# Patient Record
Sex: MALE | Race: WHITE
[De-identification: names, ages, dates, MRNs, and addresses within clinical notes are randomized per-mention and may not be internally consistent; named-entity substitution may affect disease eponyms.]

---

## 2019-12-07 ENCOUNTER — HOSPITAL ENCOUNTER (INPATIENT)
Dept: HOSPITAL 97 - ER | Age: 69
LOS: 2 days | Discharge: HOME | DRG: 287 | End: 2019-12-09
Attending: FAMILY MEDICINE | Admitting: HOSPITALIST
Payer: COMMERCIAL

## 2019-12-07 VITALS — BODY MASS INDEX: 46.3 KG/M2

## 2019-12-07 DIAGNOSIS — E78.5: ICD-10-CM

## 2019-12-07 DIAGNOSIS — Z99.81: ICD-10-CM

## 2019-12-07 DIAGNOSIS — I24.9: Primary | ICD-10-CM

## 2019-12-07 DIAGNOSIS — I11.0: ICD-10-CM

## 2019-12-07 DIAGNOSIS — I25.2: ICD-10-CM

## 2019-12-07 DIAGNOSIS — I25.10: ICD-10-CM

## 2019-12-07 DIAGNOSIS — I50.32: ICD-10-CM

## 2019-12-07 DIAGNOSIS — I48.0: ICD-10-CM

## 2019-12-07 DIAGNOSIS — K21.9: ICD-10-CM

## 2019-12-07 DIAGNOSIS — E66.01: ICD-10-CM

## 2019-12-07 DIAGNOSIS — G47.33: ICD-10-CM

## 2019-12-07 LAB
ALBUMIN SERPL BCP-MCNC: 3.9 G/DL (ref 3.4–5)
ALP SERPL-CCNC: 47 U/L (ref 45–117)
ALT SERPL W P-5'-P-CCNC: 30 U/L (ref 12–78)
AST SERPL W P-5'-P-CCNC: 16 U/L (ref 15–37)
BUN BLD-MCNC: 15 MG/DL (ref 7–18)
GLUCOSE SERPLBLD-MCNC: 121 MG/DL (ref 74–106)
HCT VFR BLD CALC: 36.8 % (ref 39.6–49)
HDLC SERPL-MCNC: 56 MG/DL (ref 40–60)
INR BLD: 1.04
LDLC SERPL CALC-MCNC: 58 MG/DL (ref ?–130)
LYMPHOCYTES # SPEC AUTO: 1.4 K/UL (ref 0.7–4.9)
MAGNESIUM SERPL-MCNC: 2.2 MG/DL (ref 1.8–2.4)
NT-PROBNP SERPL-MCNC: 979 PG/ML (ref ?–125)
PMV BLD: 10.1 FL (ref 7.6–11.3)
POTASSIUM SERPL-SCNC: 4.2 MMOL/L (ref 3.5–5.1)
RBC # BLD: 4.23 M/UL (ref 4.33–5.43)
TROPONIN (EMERG DEPT USE ONLY): 0.05 NG/ML (ref 0–0.04)
TROPONIN I: 0.68 NG/ML (ref 0–0.04)

## 2019-12-07 PROCEDURE — 80076 HEPATIC FUNCTION PANEL: CPT

## 2019-12-07 PROCEDURE — 80048 BASIC METABOLIC PNL TOTAL CA: CPT

## 2019-12-07 PROCEDURE — 80061 LIPID PANEL: CPT

## 2019-12-07 PROCEDURE — 71045 X-RAY EXAM CHEST 1 VIEW: CPT

## 2019-12-07 PROCEDURE — 84484 ASSAY OF TROPONIN QUANT: CPT

## 2019-12-07 PROCEDURE — 93454 CORONARY ARTERY ANGIO S&I: CPT

## 2019-12-07 PROCEDURE — 85610 PROTHROMBIN TIME: CPT

## 2019-12-07 PROCEDURE — 96374 THER/PROPH/DIAG INJ IV PUSH: CPT

## 2019-12-07 PROCEDURE — 83880 ASSAY OF NATRIURETIC PEPTIDE: CPT

## 2019-12-07 PROCEDURE — 93005 ELECTROCARDIOGRAM TRACING: CPT

## 2019-12-07 PROCEDURE — 94760 N-INVAS EAR/PLS OXIMETRY 1: CPT

## 2019-12-07 PROCEDURE — 96372 THER/PROPH/DIAG INJ SC/IM: CPT

## 2019-12-07 PROCEDURE — 36415 COLL VENOUS BLD VENIPUNCTURE: CPT

## 2019-12-07 PROCEDURE — 85025 COMPLETE CBC W/AUTO DIFF WBC: CPT

## 2019-12-07 PROCEDURE — 99285 EMERGENCY DEPT VISIT HI MDM: CPT

## 2019-12-07 PROCEDURE — 96375 TX/PRO/DX INJ NEW DRUG ADDON: CPT

## 2019-12-07 PROCEDURE — 83735 ASSAY OF MAGNESIUM: CPT

## 2019-12-07 PROCEDURE — 81003 URINALYSIS AUTO W/O SCOPE: CPT

## 2019-12-07 RX ADMIN — Medication SCH ML: at 21:14

## 2019-12-07 RX ADMIN — ROSUVASTATIN CALCIUM SCH MG: 10 TABLET, COATED ORAL at 21:13

## 2019-12-07 NOTE — ER
Nurse's Notes                                                                                     

 [facility]                                                                 

Name: Santino Ma                                                                               

Age: 69 yrs                                                                                       

Sex: Male                                                                                         

: 1950                                                                                   

MRN: [id number]                                                                                   

Arrival Date: 2019                                                                          

Time: 12:51                                                                                       

Account#: [id number]                                                                            

Bed 26                                                                                            

Private MD: Gaston Ruiz B                                                                     

Diagnosis: Non-ST elevation (NSTEMI) myocardial infarction                                        

                                                                                                  

Presentation:                                                                                     

                                                                                             

13:11 Presenting complaint: Patient states: Aching chest pain that began approx 1 hour ago    ph  

      while watching TV, also reports SOB x approx 1 month, denies N/V, hx of A-fib and MI.       

      Transition of care: patient was not received from another setting of care. Onset of         

      symptoms was 2019. Risk Assessment: Do you want to hurt yourself or            

      someone else? Patient reports no desire to harm self or others. Initial Sepsis Screen:      

      Does the patient meet any 2 criteria? No. Patient's initial sepsis screen is negative.      

      Does the patient have a suspected source of infection? No. Patient's initial sepsis         

      screen is negative. Care prior to arrival: None.                                            

13:11 Method Of Arrival: Ambulatory                                                           ph  

13:11 Acuity: YASHIRA 3                                                                           ph  

                                                                                                  

Historical:                                                                                       

- Allergies:                                                                                      

13:14 PENICILLINS;                                                                            ph  

- Home Meds:                                                                                      

13:14 Amiodarone Oral [Active]; aspirin 81 mg Oral chew [Active]; Albuterol Inhl [Active];    ph  

      Protonix Oral [Active]; Crestor oral oral [Active];                                         

- PMHx:                                                                                           

13:14 Atrial Fib; Hyperlipidemia; Hypertension; Myocardial infarction;                        ph  

- PSHx:                                                                                           

13:14 cardiac stents; Bilateral knee replacement; Tonsillectomy;                              ph  

                                                                                                  

- Immunization history:: Adult Immunizations up to date, Pneumococcal vaccine is up to            

  date, Flu vaccine is up to date.                                                                

- Social history:: Smoking status: Patient uses tobacco products, reports smoking                 

  history of 30 years, but quit years ago >10 years ago.                                          

                                                                                                  

                                                                                                  

Screening:                                                                                        

15:19 Abuse screen: Denies threats or abuse. Nutritional screening:. Tuberculosis screening:  sr5 

      No symptoms or risk factors identified. Fall Risk No fall in past 12 months (0 pts).        

      Secondary diagnosis (15 points) IV access (20 points). Ambulatory Aid- None/Bed             

      Rest/Nurse Assist (0 pts). Gait- Impaired (20 pts.). Mental Status- Oriented to own         

      ability (0 pts). Total Kerns Fall Scale indicates Low Risk Score (25-44 pts). Fall          

      prevention measures have been instituted. Frequent Obs/Assesments occuring Family           

      Present and informed to notify staff if they need to leave bedside As available Patient     

      and Family Educated on Fall Prevention Program and strategies.                              

                                                                                                  

Assessment:                                                                                       

13:41 General: Appears in no apparent distress. comfortable, Behavior is calm, cooperative.   sr5 

      Pain: Complains of pain in chest. Neuro: Level of Consciousness is awake, alert, obeys      

      commands, Oriented to person, place, time, situation, Speech is normal. Cardiovascular:     

      Capillary refill < 3 seconds in bilateral fingers toes Patient's skin is warm and dry.      

      +2 BLE edema. Rhythm is sinus rhythm. Respiratory: Airway is patent Respiratory effort      

      is even, unlabored, Respiratory pattern is regular, symmetrical, Breath sounds are          

      clear bilaterally. GI: No signs and/or symptoms were reported involving the                 

      gastrointestinal system. : No signs and/or symptoms were reported regarding the           

      genitourinary system. EENT: No signs and/or symptoms were reported regarding the EENT       

      system. Derm: No signs and/or symptoms reported regarding the dermatologic system.          

13:43 Cardiovascular: Reports chest pain, shortness of breath, aching, nonradiating started 1 sr5 

      hr PTA                                                                                      

16:30 Reassessment: Patient and/or family updated on plan of care and expected duration. Pain sr5 

      level reassessed. Patient is alert, oriented x 3, equal unlabored respirations, skin        

      warm/dry/pink. Awaiting inpatient room assignment.                                          

16:47 Reassessment: Patient and/or family updated on plan of care and expected duration. Pain sr5 

      level reassessed. Patient is alert, oriented x 3, equal unlabored respirations, skin        

      warm/dry/pink. Awaiting room assignment. Pain: Pain does not radiate. Pain began 1 hr       

      PTA today.                                                                                  

17:22 Reassessment: Report called to Marnie fernandez RN on 4th floor. Reported repeat        sr5 

      troponin due at 1810. Also spoke with ER provider regarding rising BP, no new orders        

      received at this time. Updated family on POC.                                               

                                                                                                  

Vital Signs:                                                                                      

13:15  / 77; Pulse 68; Resp 18; Pulse Ox 97% on R/A; Weight 136.08 kg; Height 5 ft. 9   ph  

      in. (175.26 cm); Pain 3/10;                                                                 

13:43  / 81; Pulse 58; Resp 18; Temp 98.6(O); Pulse Ox 95% on R/A; Pain 5/10;           sr5 

15:19  / 63; Pulse 62; Resp 16; Pulse Ox 99% on R/A; Pain 1/10;                         sr5 

15:38 Weight 142.2 kg (M);                                                                    sr5 

16:29  / 55; Pulse 56; Resp 18; Pulse Ox 99% on R/A; Pain 1/10;                         sr5 

17:15  / 76; Pulse 56; Resp 18; Pulse Ox 95% on R/A;                                    sr5 

15:38 Body Mass Index 46.30 (142.20 kg, 175.26 cm)                                            sr5 

15:19 Dr. Lynn at bedside                                                                   sr5 

16:29 LEFT sided chest pain, dull, nonrad                                                     sr5 

                                                                                                  

Vitals:                                                                                           

13:43 Cardiac Rhythm Assessment Sinus yeni.                                                  sr5 

15:19 Cardiac Rhythm Assessment Sinus rhythm.                                                 sr5 

16:47 Cardiac Rhythm Assessment Sinus yeni.                                                  sr5 

                                                                                                  

ED Course:                                                                                        

12:51 Patient arrived in ED.                                                                  am2 

12:51 Gaston Ruiz MD is Private Physician.                                                am2 

13:12 Triage completed.                                                                       ph  

13:13 Robi Bagley NP is PHCP.                                                           pm1 

13:13 Filiberto Kevin MD is Attending Physician.                                              pm1 

13:15 Arm band placed on Patient placed in an exam room, on a stretcher, on cardiac monitor,  ph  

      on pulse oximetry.                                                                          

13:18 EKG done, by ED staff, reviewed by Robi Bagley NP.                                  dh3 

13:30 XRAY Chest (1 view) In Process Unspecified.                                             EDMS

13:40 Yoandy Guerra, RN is Primary Nurse.                                                     sr5 

13:40 Initial lab(s) drawn, by me, sent to lab. Inserted saline lock: 20 gauge in left        lt1 

      antecubital area, using aseptic technique.                                                  

14:32 Danyel Lynn is Hospitalizing Provider.                                               pm1 

15:19 Admitting physician to see patient.                                                     sr5 

15:19 Patient has correct armband on for positive identification. Allergy band placed. Fall   sr5 

      risk band placed. Placed in gown. Bed in low position. Call light in reach. Side rails      

      up X 1. Cardiac monitor on. Pulse ox on. NIBP on.                                           

15:19 No provider procedures requiring assistance completed. Patient maintains SpO2           sr5 

      saturation greater than 95% on room air. O2 via Reports using CPAP at night.                

17:43 Patient admitted, IV remains in place.                                                  sr5 

                                                                                                  

Administered Medications:                                                                         

13:50 Drug: fentaNYL (PF) 50 mcg Route: IVP; Site: left antecubital;                          sr5 

15:17 Follow up: Response: Pain is decreased                                                  sr5 

13:50 Drug: Zofran 4 mg Route: IVP; Site: left antecubital;                                   sr5 

15:17 Follow up: Response: Nausea is decreased                                                sr5 

14:55 Drug: Aspirin 325 mg Route: PO;                                                         sr5 

17:44 Follow up: Response: Pain is decreased                                                  sr5 

15:39 Drug: Lovenox 1 mg/kg Route: Sub-Q; Site: abdomen;                                      sr5 

17:44 Follow up: Response: No adverse reaction                                                sr5 

16:29 Drug: fentaNYL (PF) 25 mcg Route: IVP; Site: left antecubital;                          sr5 

17:43 Follow up: Response: Pain is decreased                                                  sr5 

                                                                                                  

                                                                                                  

Outcome:                                                                                          

14:34 Decision to Hospitalize by Provider.                                                    pm1 

17:41 Patient left the ED.                                                                    sr5 

17:42 Admitted to Tele                                                                        sr5 

17:42 Condition: improved                                                                         

17:42 Instructed on the need for admit.                                                           

                                                                                                  

Signatures:                                                                                       

Dispatcher [employer]                           EDMS                                                 

Ivis Li, RN                      RN   ph                                                   

Robi Bagley, WALLACE                    NP   pm1                                                  

Yoandy Guerra RN                       RN   sr5                                                  

Blanche Santana Deanna                              3                                                  

Erica Shaw                                   1                                                  

                                                                                                  

Corrections: (The following items were deleted from the chart)                                    

15:19 13:14 Allergies: Codeine; ph                                                            sr5 

                                                                                                  

**************************************************************************************************

## 2019-12-07 NOTE — EDPHYS
Physician Documentation                                                                           

 [facility]                                                                 

Name: Santino Ma                                                                               

Age: 69 yrs                                                                                       

Sex: Male                                                                                         

: 1950                                                                                   

MRN: [id number]                                                                                   

Arrival Date: 2019                                                                          

Time: 12:51                                                                                       

Account#: [id number]                                                                            

Bed 26                                                                                            

Private MD: Gaston Ruiz B                                                                     

ED Physician Filiberto Kevin                                                                       

HPI:                                                                                              

                                                                                             

13:25 This 69 yrs old  Male presents to ER via Ambulatory with complaints of Chest   pm1 

      Pain > 29 y/o.                                                                              

13:25 The patient or guardian reports chest pain that is located primarily in the lateral     pm1 

      left breast. Onset: 2 hour(s) ago. The pain does not radiate. Associated signs and          

      symptoms: Pertinent positives: Tingling to left arm, Pertinent negatives: abdominal         

      pain, dizziness, headache, nausea, shortness of breath, vomiting. The chest pain is         

      described as sharp. Duration: The patient or guardian reports a single episode, that is     

      still ongoing. Severity of pain: in the emergency department the pain is a 4 / 10. The      

      patient has experienced a previous episode, approximately 2 years ago, symptoms similar     

      to MI requiring 1 stent.                                                                    

                                                                                                  

Historical:                                                                                       

- Allergies:                                                                                      

13:14 PENICILLINS;                                                                            ph  

- Home Meds:                                                                                      

13:14 Amiodarone Oral [Active]; aspirin 81 mg Oral chew [Active]; Albuterol Inhl [Active];    ph  

      Protonix Oral [Active]; Crestor oral oral [Active];                                         

- PMHx:                                                                                           

13:14 Atrial Fib; Hyperlipidemia; Hypertension; Myocardial infarction;                        ph  

- PSHx:                                                                                           

13:14 cardiac stents; Bilateral knee replacement; Tonsillectomy;                              ph  

                                                                                                  

- Immunization history:: Adult Immunizations up to date, Pneumococcal vaccine is up to            

  date, Flu vaccine is up to date.                                                                

- Social history:: Smoking status: Patient uses tobacco products, reports smoking                 

  history of 30 years, but quit years ago >10 years ago.                                          

                                                                                                  

                                                                                                  

ROS:                                                                                              

13:25 Constitutional: Negative for fever, chills, and weight loss, Eyes: Negative for injury, pm1 

      pain, redness, and discharge, ENT: Negative for injury, pain, and discharge, Neck:          

      Negative for injury, pain, and swelling.                                                    

13:25 Respiratory: Negative for shortness of breath, cough, wheezing, and pleuritic chest         

      pain, Abdomen/GI: Negative for abdominal pain, nausea, vomiting, diarrhea, and              

      constipation, Back: Negative for injury and pain, : Negative for injury, bleeding,        

      discharge, and swelling, MS/Extremity: Negative for injury and deformity, Skin:             

      Negative for injury, rash, and discoloration, Neuro: Negative for headache, weakness,       

      numbness, tingling, and seizure.                                                            

13:25 Cardiovascular: Positive for chest pain, Negative for edema, palpitations.                  

                                                                                                  

Exam:                                                                                             

13:25 Constitutional:  This is a well developed, well nourished patient who is awake, alert,  pm1 

      and in no acute distress. Head/Face:  Normocephalic, atraumatic. Neck:  Trachea             

      midline, no thyromegaly or masses palpated, and no cervical lymphadenopathy.  Supple,       

      full range of motion without nuchal rigidity, or vertebral point tenderness.  No            

      Meningismus. Chest/axilla:  Normal chest wall appearance and motion.  Nontender with no     

      deformity.  No lesions are appreciated. Cardiovascular:  Regular rate and rhythm with a     

      normal S1 and S2.  No gallops, murmurs, or rubs.  Normal PMI, no JVD.  No pulse             

      deficits. Respiratory:  Lungs have equal breath sounds bilaterally, clear to                

      auscultation and percussion.  No rales, rhonchi or wheezes noted.  No increased work of     

      breathing, no retractions or nasal flaring. Abdomen/GI:  Soft, non-tender, with normal      

      bowel sounds.  No distension or tympany.  No guarding or rebound.  No evidence of           

      tenderness throughout. Back:  No spinal tenderness.  No costovertebral tenderness.          

      Full range of motion. Skin:  Warm, dry with normal turgor.  Normal color with no            

      rashes, no lesions, and no evidence of cellulitis. MS/ Extremity:  Pulses equal, no         

      cyanosis.  Neurovascular intact.  Full, normal range of motion.                             

13:25 Neuro: Orientation: is normal, Motor: is normal, moves all fours, Sensation: is normal,     

      no obvious gross deficits.                                                                  

                                                                                                  

Vital Signs:                                                                                      

13:15  / 77; Pulse 68; Resp 18; Pulse Ox 97% on R/A; Weight 136.08 kg; Height 5 ft. 9   ph  

      in. (175.26 cm); Pain 3/10;                                                                 

13:43  / 81; Pulse 58; Resp 18; Temp 98.6(O); Pulse Ox 95% on R/A; Pain 5/10;           sr5 

15:19  / 63; Pulse 62; Resp 16; Pulse Ox 99% on R/A; Pain 1/10;                         sr5 

15:38 Weight 142.2 kg (M);                                                                    sr5 

16:29  / 55; Pulse 56; Resp 18; Pulse Ox 99% on R/A; Pain 1/10;                         sr5 

17:15  / 76; Pulse 56; Resp 18; Pulse Ox 95% on R/A;                                    sr5 

15:38 Body Mass Index 46.30 (142.20 kg, 175.26 cm)                                            sr5 

15:19 Dr. Lynn at bedside                                                                   sr5 

16:29 LEFT sided chest pain, dull, nonrad                                                     sr5 

                                                                                                  

MDM:                                                                                              

13:13 Patient medically screened.                                                             pm1 

14:31 Data reviewed: vital signs. Data interpreted: Pulse oximetry: on room air is 95 %.      pm1 

      Interpretation: normal.                                                                     

14:32 Physician consultation: Danyel Lynn regarding admission, patient's condition, would   pm1 

      like consultation with Dr. Soto.                                                          

14:43 Physician consultation: Mao Soto MD was contacted at 14:43, regarding consult,      pm1 

      patient's condition.                                                                        

16:20 ED course: Patient pain 1/10.                                                           pm1 

                                                                                                  

                                                                                             

13:13 Order name: Basic Metabolic Panel; Complete Time: 14:12                                 pm1 

                                                                                             

13:13 Order name: CBC with Diff; Complete Time: 14:02                                         pm1 

                                                                                             

13:13 Order name: LFT's; Complete Time: 14:12                                                 pm1 

                                                                                             

13:13 Order name: Magnesium; Complete Time: 14:12                                             pm1 

                                                                                             

13:13 Order name: NT PRO-BNP; Complete Time: 14:12                                            pm1 

                                                                                             

13:13 Order name: PT-INR; Complete Time: 14:02                                                pm1 

                                                                                             

13:13 Order name: Troponin (emerg Dept Use Only); Complete Time: 14:12                        pm1 

                                                                                             

13:13 Order name: XRAY Chest (1 view); Complete Time: 14:02                                   pm1 

                                                                                             

15:10 Order name: Urine Dipstick--Ancillary (enter results)                                   eb  

                                                                                             

13:13 Order name: EKG; Complete Time: 13:14                                                   pm1 

                                                                                             

13:13 Order name: Cardiac monitoring; Complete Time: 13:20                                    pm1 

                                                                                             

13:13 Order name: EKG - Nurse/Tech; Complete Time: 13:20                                      pm1 

                                                                                             

13:13 Order name: IV Saline Lock; Complete Time: 13:32                                        pm1 

                                                                                             

13:13 Order name: Labs collected and sent; Complete Time: 13:32                               pm1 

                                                                                             

13:13 Order name: O2 Per Protocol; Complete Time: 13:32                                       pm1 

                                                                                             

13:13 Order name: O2 Sat Monitoring; Complete Time: 13:32                                     pm1 

                                                                                                  

Administered Medications:                                                                         

13:50 Drug: fentaNYL (PF) 50 mcg Route: IVP; Site: left antecubital;                          sr5 

15:17 Follow up: Response: Pain is decreased                                                  sr5 

13:50 Drug: Zofran 4 mg Route: IVP; Site: left antecubital;                                   sr5 

15:17 Follow up: Response: Nausea is decreased                                                sr5 

14:55 Drug: Aspirin 325 mg Route: PO;                                                         sr5 

17:44 Follow up: Response: Pain is decreased                                                  sr5 

15:39 Drug: Lovenox 1 mg/kg Route: Sub-Q; Site: abdomen;                                      sr5 

17:44 Follow up: Response: No adverse reaction                                                sr5 

16:29 Drug: fentaNYL (PF) 25 mcg Route: IVP; Site: left antecubital;                          sr5 

17:43 Follow up: Response: Pain is decreased                                                  sr5 

                                                                                                  

                                                                                                  

Disposition:                                                                                      

                                                                                             

07:22 Co-signature as Attending Physician, Filiberto Kevin MD I agree with the assessment and   kdr 

      plan of care.                                                                               

                                                                                                  

Disposition:                                                                                      

19 14:34 Hospitalization ordered by Danyel Lynn for Inpatient Admission. Preliminary     

  diagnosis is Non-ST elevation (NSTEMI) myocardial infarction.                                   

- Bed requested for Telemetry/MedSurg (Inpatient).                                                

- Status is Inpatient Admission.                                                              sr5 

- Condition is Stable.                                                                            

- Problem is new.                                                                                 

- Symptoms have improved.                                                                         

UTI on Admission? No                                                                              

                                                                                                  

                                                                                                  

                                                                                                  

Signatures:                                                                                       

Dispatcher [employer]                           Monika Dickey RN RN dw Rittger, Kevin, MD MD   [facility]                                                  

Ivis Li RN RN   ph                                                   

Robi Bagley, WALLACE                    NP   pm1                                                  

Yoandy Guerra RN                       RN   sr5                                                  

                                                                                                  

Corrections: (The following items were deleted from the chart)                                    

                                                                                             

15:19 13:14 Allergies: Codeine; ph                                                            sr5 

16:54 14:34 Hospitalization Ordered by Danyel Lynn for Inpatient Admission. Preliminary     dw  

      diagnosis is Non-ST elevation (NSTEMI) myocardial infarction. Bed requested for             

      Telemetry/MedSurg (Inpatient). Status is Inpatient Admission. Condition is Stable.          

      Problem is new. Symptoms have improved. UTI on Admission? No. pm1                           

17:41 16:54 2019 14:34 Hospitalization Ordered by Danyel Lynn for Inpatient           sr5 

      Admission. Preliminary diagnosis is Non-ST elevation (NSTEMI) myocardial infarction.        

      Bed requested for Telemetry/MedSurg (Inpatient). Status is Inpatient Admission.             

      Condition is Stable. Problem is new. Symptoms have improved. UTI on Admission? No. dw       

                                                                                                  

**************************************************************************************************

## 2019-12-07 NOTE — P.HP
Certification for Inpatient


Patient admitted to: Observation


With expected LOS: <2 Midnights


Practitioner: I am a practitioner with admitting privileges, knowledge of 

patient current condition, hospital course, and medical plan of care.


Services: Services provided to patient in accordance with Admission 

requirements found in Title 42 Section 412.3 of the Code of Federal Regulations





Patient History


Date of Service: 12/07/19


Reason for admission: Chest pain


History of Present Illness: 


6-year-old morbidly obese gentleman with a history of obstructive sleep apnea, 

history of MI, coronary artery disease status post stent, history of paroxysmal 

atrial fibrillation on amiodarone presented to the ED due to sudden onset of 

chest pain which occurred at rest, located on the left anterior chest, moved to 

the left flank, constant, persisted for more than 1 hour and associated with 

tingling sensation in the left.  Patient stated he had tingling sensation in 

the left arm when he was diagnosed with MI 2 years ago.  He also reports 

intermittent shortness of breath and takes Lasix or occasionally which helps.  

He also takes diseases or occasionally for lower extremity edema.


In the ED, initial troponin is mildly elevated to 0.05, EKG demonstrated no 

ischemic changes.  Chest x-ray reported no acute disease.  Given patient's 

significant history of coronary artery disease, he is placed under observation 

for NSTEMI.





Allergies





codeine [Codeine] Allergy (Verified 12/25/16 01:23)


 Hives


Penicillins Allergy (Verified 12/25/16 01:23)


 Hives





Home Medications: 








Multivit-Min/FA/Lycopene/Lut [Centrum Silver Tablet] 1 tab PO DAILY 06/14/14 


Cetirizine HCl [Zyrtec] 10 mg PO DAILY 12/20/16 


Cholecalciferol (Vitamin D3) [Vitamin D3] 5,000 unit PO DAILY 12/20/16 


Clopidogrel Bisulfate [Plavix*] 75 mg PO DAILY #30 tablet 12/24/16 


Nitroglycerin [Nitrostat*] 0.4 mg SL UD PRN #25 tab 12/24/16 


Pantoprazole [Protonix Tab*] 40 mg PO DAILYAC #30 tab 12/24/16 


Aspirin [Aspirin EC 81 MG] 1 tab PO DAILY 12/25/16 


Amiodarone HCl [Pacerone] 200 mg PO DAILY #30 tablet 01/03/17 


Rosuvastatin [Crestor*] 10 mg PO BEDTIME 02/02/17 


Furosemide [Lasix] 40 mg PO DAILY PRN #30 tab 02/03/17 








- Past Medical/Surgical History


Diabetic: No


-: HTN


-: HYPERCHOLESTEROL


-: atrial fibrillation


-: GERD


-: MI


-: Obstructive sleep apnea


-: BILATERAL KNEE REPLACEMENT


-: RIGHT CAROTID SURGERY


-: BILATERAL HAND SURGERY TO RELEASE LITTLE FINGERS


-: MERLE IN LEFT FEMUR


-: cardiac stent





- Family History


  ** Mother


-: Heart disease





  ** Brother


-: Heart disease, Stroke





- Social History


Alcohol use: No


CD- Drugs: No


Caffeine use: Yes





Review of Systems


Other: 


General:  No fever, no malaise, no unintentional weight loss.


Eyes:  No eye discharge, 


Respiratory:  No cough.


CVS:  No palpitation, no lightheadedness.


GI:  No abdominal pain, no nausea no vomit, no constipation, no diarrhea.


Genitourinary:  No dysuria, no urinary frequency, no incontinence, no hematuria.


Musculoskeletal:  No joint pains, or joint swelling, no gait instability.


Neurology:  No headache, no asymmetric, weakness, no problem with swallowing.





Except as documented, all other systems reviewed and negative.











Physical Examination





- Physical Exam


General: Alert, In no apparent distress, Oriented x3, Obese


HEENT: Normocephalic, PERRLA, Mucous membr. moist/pink, Sclerae nonicteric


Neck: Supple, JVD not distended


Respiratory: Clear to auscultation bilaterally, Normal air movement


Cardiovascular: Regular rate/rhythm, Normal S1 S2, Edema (1+ bilateral leg edema

), Systolic murmur


Capillary refill: <2 Seconds


Gastrointestinal: Normal bowel sounds, Soft and benign, No tenderness


Musculoskeletal: No swelling


Integumentary: No rashes


Neurological: Normal speech, Normal strength at 5/5 x4 extr





- Studies


Laboratory Data (last 24 hrs)





12/07/19 13:38: PT 12.3, INR 1.04


12/07/19 13:38: WBC 10.6, Hgb 12.5 L, Hct 36.8 L, Plt Count 120 L


12/07/19 13:38: Sodium 143, Potassium 4.2, BUN 15, Creatinine 1.18, Glucose 121 

H, Magnesium 2.2, Total Bilirubin 0.4, AST 16, ALT 30, Alkaline Phosphatase 47








Assessment and Plan





- Problems (Diagnosis)


(1) NSTEMI (non-ST elevated myocardial infarction)


Current Visit: Yes   Status: Acute   





(2) Chronic diastolic heart failure


Current Visit: Yes   Status: Acute   





(3) CAD (coronary artery disease)


Current Visit: No   Status: Chronic   


Qualifiers: 


   Coronary Disease-Associated Artery/Lesion type: native artery   Native vs. 

transplanted heart: native heart   Associated angina: without angina   

Qualified Code(s): I25.10 - Atherosclerotic heart disease of native coronary 

artery without angina pectoris   





(4) Obstructive apnea


Current Visit: No   Status: Acute   





(5) Hyperlipidemia


Current Visit: No   Status: Chronic   


Qualifiers: 


   Hyperlipidemia type: unspecified   Qualified Code(s): E78.5 - Hyperlipidemia

, unspecified   





(6) Hypertension


Current Visit: No   Status: Chronic   


Qualifiers: 


   Hypertension type: essential hypertension   Qualified Code(s): I10 - 

Essential (primary) hypertension   





- Plan


Place under observation.


Patient states his most recent nuclear stress test and echocardiogram 6 months 

ago were unremarkable


Telemetry


Full-dose Lovenox continue to trend troponin


Cardiology consult


Continue amiodarone, lipitor and ASA.


IV narcotics for pain


Continue home antihypertensives.


CPAP during sleep.














- Advance Directives


Does patient have a Living Will: Yes


Does patient have a Durable POA for Healthcare: Yes

## 2019-12-07 NOTE — RAD REPORT
EXAM DESCRIPTION:  Monie Single View12/7/2019 1:29 pm

 

CLINICAL HISTORY:  Chest pain

 

COMPARISON:  2017

 

FINDINGS:   The lungs appear clear of acute infiltrate. The heart is normal size

 

IMPRESSION:   No acute abnormalities displayed

## 2019-12-08 LAB
BUN BLD-MCNC: 13 MG/DL (ref 7–18)
GLUCOSE SERPLBLD-MCNC: 110 MG/DL (ref 74–106)
HCT VFR BLD CALC: 35.5 % (ref 39.6–49)
LYMPHOCYTES # SPEC AUTO: 2.1 K/UL (ref 0.7–4.9)
PMV BLD: 9.8 FL (ref 7.6–11.3)
POTASSIUM SERPL-SCNC: 4.6 MMOL/L (ref 3.5–5.1)
RBC # BLD: 4.04 M/UL (ref 4.33–5.43)

## 2019-12-08 RX ADMIN — ROSUVASTATIN CALCIUM SCH MG: 10 TABLET, COATED ORAL at 20:29

## 2019-12-08 RX ADMIN — AMIODARONE HYDROCHLORIDE SCH MG: 200 TABLET ORAL at 08:22

## 2019-12-08 RX ADMIN — Medication SCH ML: at 08:22

## 2019-12-08 RX ADMIN — ASPIRIN SCH MG: 81 TABLET, COATED ORAL at 08:22

## 2019-12-08 RX ADMIN — CLOPIDOGREL BISULFATE SCH MG: 75 TABLET, FILM COATED ORAL at 08:22

## 2019-12-08 RX ADMIN — Medication SCH ML: at 20:30

## 2019-12-08 NOTE — CON
History Of Present Illness:  Mr. Ma had chest pain and came to the hospital.  His troponins are s
lightly elevated at 0.05.  He has a history of myocardial infarction causing cardiac arrest, treated 
with defibrillation and an acute coronary stent to the LAD that was in 2015.  He also has a history o
f atrial fibrillation, hypertension, and dyslipidemia.



Allergies:  HE IS ALLERGIC TO PENICILLIN, HE USES NO TOBACCO, ALTHOUGH HE DID IN THE PAST.  HE HAS HA
D CARDIAC STENT, BILATERAL KNEE REPLACEMENT, TONSILLECTOMY.



Medications:  His medications have been amiodarone, aspirin, albuterol, Protonix, and Crestor.



Physical Examination:

General:  He is alert, oriented, pleasant, not in distress.  He is obese. 

HEENT:  Unremarkable. 

Lungs:  Clear. 

Heart:  Within normal limits. 

Extremities:  2+ edema.  Distal pulses palpable. 



His electrocardiogram shows no injury pattern.  It is similar to his old EKGs, sinus rhythm, prematur
e atrial complexes, evidence of an old anterior inferior infarction.



Impression:  The patient has an acute coronary artery syndrome.  We will give him aspirin, Plavix, Lo
venox and plan to do a cardiac cath on Monday, December 9th.  The patient seems to understand the 

procedure its potential benefits, indications, risks, and agrees to proceed.





JACINDA/ROCHELLE

DD:  12/07/2019 18:05:41Voice ID:  [id number]

DT:  12/08/2019 01:44:51Report ID:  [id number]

## 2019-12-08 NOTE — PN
Mr. Ma is set up to do a cardiac cath tomorrow.  We will stop Lovenox after 8 p.m. today.  He delvin
l continue to get aspirin, heparin, amiodarone, and Crestor.  We will do a cardiac cath, possible tomi
nt because of acute coronary syndrome without ST elevation.  Patient is asymptomatic now.  He and his
 wife both seem to understand the procedure, potential benefits, indications, risks, and agreed to pr
oceed.





JACINDA/ROCHELLE

DD:  12/08/2019 10:25:12Voice ID:  [id number]

DT:  12/08/2019 15:29:50Report ID:  [id number]

## 2019-12-08 NOTE — P.PN
Subjective


Date of Service: 12/08/19


Primary Care Provider: Dr. Ruiz


Chief Complaint: Chest pain


Subjective: Doing well





Physical Examination





- Vital Signs


Temperature: 97.2 F


Blood Pressure: 156/73


Pulse: 61


Respirations: 18


Pulse Ox (%): 96





- Physical Exam


General: Alert, In no apparent distress, Oriented x3, Cooperative


HEENT: Atraumatic


Neck: Supple


Respiratory: Clear to auscultation bilaterally, Normal air movement


Cardiovascular: Normal pulses, Regular rate/rhythm


Neurological: Normal speech, Normal strength at 5/5 x4 extr, Normal tone, 

Normal affect





- Studies


Laboratory Data (last 24 hrs)





12/07/19 17:50: Troponin I 0.68 H*, Triglycerides 105, Cholesterol 135, HDL 

Cholesterol 56, Cholesterol/HDL Ratio 2.41





Medications List Reviewed: Yes





Assessment & Plan


Discharge Plan: Home


Plan to discharge in: 24 Hours


Physician Review Additional Text: 





Impression:


Acute Coronary Syndrome


Atrial fibrillation, paroxysmal, not on chronic anti coagulation therapy


Hypertension


Obstructive sleep apnea on CPAP


Hyperlipidemia





Plan:


Acute Coronary Syndrome:  Patient stable this time.  Continue with current 

medication.  Patient to have heart catheterization tomorrow with Cardiology.  

Await findings.  Anticipate discharge as early as tomorrow if heart 

catheterization unremarkable.


Atrial fibrillation, paroxysmally, not on chronic anti coagulation therapy:  

Continue with medication


Hypertension:  Continue medication


Obstructive sleep apnea on CPAP:  Continue CPAP at night.


Hyperlipidemia:  Continue medication


Time Spent Managing Pts Care (In Minutes): 55

## 2019-12-08 NOTE — EKG
Test Date:    2019-12-07               Test Time:    13:17:20

Technician:   VANDANA                                     

                                                     

MEASUREMENT RESULTS:                                       

Intervals:                                           

Rate:         63                                     

CO:                                                  

QRSD:         110                                    

QT:           448                                    

QTc:          458                                    

Axis:                                                

P:            -46                                    

CO:                                                  

QRS:          -16                                    

T:            66                                     

                                                     

INTERPRETIVE STATEMENTS:                                       

                                                     

Sinus rhythm

Cannot rule out Anterior infarct, age undetermined

Abnormal ECG

Compared to ECG 02/02/2017 13:58:36

Atrial premature complex(es) no longer present

Left-axis deviation no longer present

T-wave abnormality no longer present

Myocardial infarct finding still present



Electronically Signed On 12-08-19 06:04:49 CST by Mao Soto

## 2019-12-09 VITALS — DIASTOLIC BLOOD PRESSURE: 68 MMHG | SYSTOLIC BLOOD PRESSURE: 163 MMHG

## 2019-12-09 VITALS — OXYGEN SATURATION: 95 %

## 2019-12-09 VITALS — TEMPERATURE: 97.5 F

## 2019-12-09 LAB
BUN BLD-MCNC: 13 MG/DL (ref 7–18)
GLUCOSE SERPLBLD-MCNC: 100 MG/DL (ref 74–106)
HCT VFR BLD CALC: 35.2 % (ref 39.6–49)
LYMPHOCYTES # SPEC AUTO: 2 K/UL (ref 0.7–4.9)
MAGNESIUM SERPL-MCNC: 2.5 MG/DL (ref 1.8–2.4)
PMV BLD: 10 FL (ref 7.6–11.3)
POTASSIUM SERPL-SCNC: 4.1 MMOL/L (ref 3.5–5.1)
RBC # BLD: 4.01 M/UL (ref 4.33–5.43)

## 2019-12-09 PROCEDURE — 4A023N7 MEASUREMENT OF CARDIAC SAMPLING AND PRESSURE, LEFT HEART, PERCUTANEOUS APPROACH: ICD-10-PCS

## 2019-12-09 PROCEDURE — B215YZZ FLUOROSCOPY OF LEFT HEART USING OTHER CONTRAST: ICD-10-PCS

## 2019-12-09 RX ADMIN — AMIODARONE HYDROCHLORIDE SCH MG: 200 TABLET ORAL at 06:13

## 2019-12-09 RX ADMIN — PANTOPRAZOLE SODIUM SCH: 40 TABLET, DELAYED RELEASE ORAL at 07:30

## 2019-12-09 RX ADMIN — ASPIRIN SCH MG: 81 TABLET, COATED ORAL at 06:14

## 2019-12-09 RX ADMIN — CLOPIDOGREL BISULFATE SCH: 75 TABLET, FILM COATED ORAL at 08:54

## 2019-12-09 RX ADMIN — ASPIRIN SCH: 81 TABLET, COATED ORAL at 08:53

## 2019-12-09 RX ADMIN — Medication SCH: at 09:00

## 2019-12-09 RX ADMIN — CLOPIDOGREL BISULFATE SCH MG: 75 TABLET, FILM COATED ORAL at 06:13

## 2019-12-09 RX ADMIN — AMIODARONE HYDROCHLORIDE SCH: 200 TABLET ORAL at 08:54

## 2019-12-09 RX ADMIN — PANTOPRAZOLE SODIUM SCH MG: 40 TABLET, DELAYED RELEASE ORAL at 06:13

## 2019-12-09 NOTE — P.DS
Admission Date: 12/07/19


Discharge Date: 12/09/19


Primary Care Provider: Dr. Ruiz


Disposition: ROUTINE DISCHARGE


Discharge Condition: GOOD


Reason for Admission: Chest pain


Consultations: 





Cardiology-Dr. Soto





Procedures: 





Heart Catheterization: 


No need for stenting.  Normal coronaries noted. Patient with CAD.  Case 

discussed with cardiology.  Continue medical management. 





Medical Problem List:


Acute Coronary Syndrome


Atrial fibrillation, paroxysmal, not on chronic anti coagulation therapy


GERD


Obstructive sleep apnea on CPAP


Hyperlipidemia





Brief History of Present Illness: 





69-year-old  male presented to the emergency room with chest pain. 

Patient with elevated troponin.  Patient was admitted for further evaluation.


Hospital Course: 





Patient presented with chest pain secondary to Acute Coronary Syndrome.  

Patient seen and evaluated by Cardiology.  Heart catheterization was 

recommended.  Heart catheterization showed normal coronaries.  No stenting 

required.  Cardiology recommended to continue medical management.  New 

medication includes Plavix 75 mg daily.  At discharge he will continue with 

aspirin 81 mg daily, Plavix 75 mg daily, and Crestor 40 mg daily.  Patient may 

also use nitroglycerin as needed for chest pain. Patient also has Lasix 40 mg 

as needed for increase edema. Recommend follow up with cardiology in 1-2 weeks 

to monitor his progress.





Patient with paroxysmally atrial fibrillation not on chronic anti coagulation 

therapy.  At discharge he will continue with amiodarone 200 mg daily.





Patient with obstructive sleep apnea.  He currently uses CPAP at night.  

Patient will continue with CPAP.





Patient with hyperlipidemia.  Patient will continue with Crestor 40 mg daily.





Patient with GERD.  Patient may continue with Protonix 40 mg once daily.


Vital Signs/Physical Exam: 














Temp Pulse Resp BP Pulse Ox


 


 97.5 F   65   16   135/74   95 


 


 12/09/19 12:23  12/09/19 12:23  12/09/19 12:23  12/09/19 12:23  12/09/19 08:00








General: Alert, In no apparent distress, Oriented x3, Cooperative


HEENT: Atraumatic


Neck: Supple


Respiratory: Clear to auscultation bilaterally, Normal air movement


Cardiovascular: Normal pulses, Regular rate/rhythm


Gastrointestinal: Normal bowel sounds, Soft and benign, Non-distended, No 

tenderness, No masses, No rebound, No guarding


Musculoskeletal: No erythema, No tenderness, No warmth


Integumentary: No tenderness/swelling, No erythema, No warmth, No cyanosis


Neurological: Normal speech, Normal strength at 5/5 x4 extr, Normal tone


Laboratory Data at Discharge: 














WBC  10.0 K/uL (4.3-10.9)   12/09/19  03:45    


 


Hgb  11.8 g/dL (13.6-17.9)  L  12/09/19  03:45    


 


Hct  35.2 % (39.6-49.0)  L  12/09/19  03:45    


 


Plt Count  106 K/uL (152-406)  L  12/09/19  03:45    


 


PT  12.3 SECONDS (9.5-12.5)   12/07/19  13:38    


 


INR  1.04   12/07/19  13:38    


 


Sodium  141 mmol/L (136-145)   12/09/19  03:45    


 


Potassium  4.1 mmol/L (3.5-5.1)   12/09/19  03:45    


 


BUN  13 mg/dL (7-18)   12/09/19  03:45    


 


Creatinine  1.02 mg/dL (0.55-1.3)   12/09/19  03:45    


 


Glucose  100 mg/dL ()   12/09/19  03:45    


 


Magnesium  2.5 mg/dL (1.8-2.4)  H  12/09/19  03:45    


 


Total Bilirubin  0.4 mg/dL (0.2-1.0)   12/07/19  13:38    


 


AST  16 U/L (15-37)   12/07/19  13:38    


 


ALT  30 U/L (12-78)   12/07/19  13:38    


 


Alkaline Phosphatase  47 U/L ()   12/07/19  13:38    


 


Troponin I  1.70 ng/mL (0.0-0.045)  H* D 12/07/19  21:40    


 


Triglycerides  105 mg/dL (<150)   12/07/19  17:50    


 


Cholesterol  135 mg/dL (<200)   12/07/19  17:50    


 


HDL Cholesterol  56 mg/dL (40-60)   12/07/19  17:50    


 


Cholesterol/HDL Ratio  2.41   12/07/19  17:50    








Home Medications: 








Multivit-Min/FA/Lycopene/Lut [Centrum Silver Tablet] 1 tab PO DAILY 06/14/14 


Nitroglycerin [Nitrostat*] 0.4 mg SL UD PRN #25 tab 12/24/16 


Pantoprazole [Protonix Tab*] 40 mg PO DAILYAC #30 tab 12/24/16 


Aspirin [Aspirin EC 81 MG] 1 tab PO DAILY 12/25/16 


Amiodarone HCl [Pacerone] 200 mg PO DAILY #30 tablet 01/03/17 


Rosuvastatin [Crestor*] 40 mg PO BEDTIME 02/02/17 


Furosemide [Lasix*] 40 mg PO DAILY PRN #30 tab 02/03/17 


Loratadine [Claritin*] 1 tab PO DAILY 12/07/19 


Clopidogrel Bisulfate [Plavix*] 75 mg PO DAILY #30 tablet 12/09/19 





New Medications: 


Clopidogrel Bisulfate [Plavix*] 75 mg PO DAILY #30 tablet


Patient Discharge Instructions: 1.  Follow up with PCP in 1 week to follow up 

this hospitalization.  2.  Patient presented with chest pain secondary to Acute 

Coronary Syndrome.  Patient seen and evaluated by Cardiology.  Heart 

catheterization was recommended.  Heart catheterization showed normal 

coronaries.  No stenting required.  Cardiology recommended to continue medical 

management.  New medication includes Plavix 75 mg daily.  At discharge he will 

continue with aspirin 81 mg daily, Plavix 75 mg daily, and Crestor 40 mg daily.

  Patient may also use nitroglycerin as needed for chest pain. Patient also has 

Lasix 40 mg as needed for increase edema. Recommend follow up with cardiology 

in 1-2 weeks to monitor his progress.  3.  Patient with paroxysmally atrial 

fibrillation not on chronic anti coagulation therapy.  At discharge he will 

continue with amiodarone 200 mg daily.  4.  Patient with obstructive sleep 

apnea.  He currently uses CPAP at night.  Patient will continue with CPAP.  5.  

Patient with hyperlipidemia.  Patient will continue with Crestor 40 mg daily.  

6.  Patient with GERD.  Patient may continue with Protonix 40 mg once daily.


Diet: AHA


Activity: Ad leuxs


Time spent managing pt's care (in minutes): 55

## 2019-12-09 NOTE — OP
Surgeon:  Mao Soto MD



Assistant:  Ana Washburn.



Identification:  The 69-year-old man.



Procedure:  Left heart catheterization and coronary angiography.



Procedure Findings:  The patient has a patent mid LAD stent.  There was no stenosis in any of his cor
onary arteries.  No attempt was made to cross the aortic valve because of extreme tortuosity of the b
rachiocephalic artery on the right.



Procedure In Detail:  The patient was brought to the cardiac cath lab in a fasting state.  He gave in
formed consent.  He had evidence of a non-ST elevation non-Q-wave myocardial infarction.  He was deysi
jose francisco with Versed and fentanyl.  Total sedation time 30 minutes.  Prepared and draped in usual sterile 
fashion.  Right radial approach was used.  Tissues around the right radial artery were anesthetized u
sing lidocaine 1%, artery was entered using a 21-gauge needle, cannulated with a 0.021 inch diameter 
guidewire and a Terumo radial sheath was placed __________ and we flushed it and gave the radial cock
tail consisting of heparin, nicardipine, and nitroglycerin.  We guided a TIG catheter into the ascend
ing aorta using a short radius J-tip Terumo Glidewire and fluoroscopy.  We were able to angiogram the
 left coronary with a TIG catheter but it would not maneuver into position to angiogram the right, so
 we attempted an Amplatz AL2.  This was unsuccessful.  We tried a 3DRC 6-Lao and this gave adequat
e views of the right coronary.  Since there were no coronary stenosis and the anatomy of the brachioc
ephalic artery on the right would make it impossible to cross the valve, we elected just to remove ca
theters over a wire.  At that point, we removed the sheath and closed the arteriotomy using a TR Band
.  There was no LV gram or LV pressures measured.  It was a challenging case because of the anatomy o
f the right brachiocephalic system.



Complications From The Procedure:  None.



Estimated Blood Loss:  15 cc.





JACINDA/ROCHELLE

DD:  12/09/2019 12:10:30Voice ID:  [id number]

DT:  12/09/2019 23:07:50Report ID:  [id number]

## 2021-04-28 LAB
BUN BLD-MCNC: 18 MG/DL (ref 7–18)
GLUCOSE SERPLBLD-MCNC: 117 MG/DL (ref 74–106)
HCT VFR BLD CALC: 36.7 % (ref 39.6–49)
INR BLD: 1.09
LYMPHOCYTES # SPEC AUTO: 1.3 K/UL (ref 0.7–4.9)
PMV BLD: 10.2 FL (ref 7.6–11.3)
POTASSIUM SERPL-SCNC: 4.2 MMOL/L (ref 3.5–5.1)
RBC # BLD: 4.1 M/UL (ref 4.33–5.43)

## 2021-04-28 NOTE — EKG
Test Date:    2021-04-28               Test Time:    09:15:19

Technician:   KS                                     

                                                     

MEASUREMENT RESULTS:                                       

Intervals:                                           

Rate:         72                                     

DC:           204                                    

QRSD:         118                                    

QT:           434                                    

QTc:          475                                    

Axis:                                                

P:            55                                     

DC:           204                                    

QRS:          -2                                     

T:            97                                     

                                                     

INTERPRETIVE STATEMENTS:                                       

                                                     

Normal sinus rhythm

Cannot rule out Inferior infarct, age undetermined

Cannot rule out Anteroseptal infarct, age undetermined

Abnormal ECG

Compared to ECG 12/07/2019 13:17:20

No significant changes



Electronically Signed On 04-28-21 12:52:01 CDT by Ricci Giang

## 2021-04-28 NOTE — RAD REPORT
EXAM DESCRIPTION:  Monie Rodriguez And Braden (2 Views)4/28/2021 10:45 am

 

CLINICAL HISTORY:  Preop for cardiac catheterization

 

COMPARISON:  2019

 

FINDINGS:  Mild bilateral interstitial lung opacities.

 

Heart is mildly to moderately enlarged

 

IMPRESSION:  These findings probably indicate mild CHF

## 2021-04-29 ENCOUNTER — HOSPITAL ENCOUNTER (OUTPATIENT)
Dept: HOSPITAL 97 - CCL | Age: 71
Discharge: HOME | End: 2021-04-29
Attending: INTERNAL MEDICINE
Payer: COMMERCIAL

## 2021-04-29 VITALS — OXYGEN SATURATION: 96 %

## 2021-04-29 VITALS — DIASTOLIC BLOOD PRESSURE: 84 MMHG | SYSTOLIC BLOOD PRESSURE: 121 MMHG

## 2021-04-29 VITALS — TEMPERATURE: 97.1 F

## 2021-04-29 DIAGNOSIS — I71.2: ICD-10-CM

## 2021-04-29 DIAGNOSIS — I27.20: ICD-10-CM

## 2021-04-29 DIAGNOSIS — E78.2: ICD-10-CM

## 2021-04-29 DIAGNOSIS — I11.0: ICD-10-CM

## 2021-04-29 DIAGNOSIS — I65.22: ICD-10-CM

## 2021-04-29 DIAGNOSIS — I35.0: Primary | ICD-10-CM

## 2021-04-29 DIAGNOSIS — G47.00: ICD-10-CM

## 2021-04-29 DIAGNOSIS — Z87.891: ICD-10-CM

## 2021-04-29 DIAGNOSIS — G47.33: ICD-10-CM

## 2021-04-29 DIAGNOSIS — Z20.822: ICD-10-CM

## 2021-04-29 DIAGNOSIS — Z88.0: ICD-10-CM

## 2021-04-29 DIAGNOSIS — Z88.6: ICD-10-CM

## 2021-04-29 DIAGNOSIS — I48.0: ICD-10-CM

## 2021-04-29 DIAGNOSIS — I50.22: ICD-10-CM

## 2021-04-29 PROCEDURE — 85610 PROTHROMBIN TIME: CPT

## 2021-04-29 PROCEDURE — 93005 ELECTROCARDIOGRAM TRACING: CPT

## 2021-04-29 PROCEDURE — 80048 BASIC METABOLIC PNL TOTAL CA: CPT

## 2021-04-29 PROCEDURE — 85730 THROMBOPLASTIN TIME PARTIAL: CPT

## 2021-04-29 PROCEDURE — 36415 COLL VENOUS BLD VENIPUNCTURE: CPT

## 2021-04-29 PROCEDURE — 93456 R HRT CORONARY ARTERY ANGIO: CPT

## 2021-04-29 PROCEDURE — 85025 COMPLETE CBC W/AUTO DIFF WBC: CPT

## 2021-04-29 PROCEDURE — 71046 X-RAY EXAM CHEST 2 VIEWS: CPT

## 2021-04-29 NOTE — OP
Surgeon:  Ricci Giang MD



Assistant:  Elver Chavez.



Reason For Admission:  Left heart catheterization, right heart catheterization, selective coronary ar
teriogram, oxygen saturation level, and cardiac output measurement.



Indication:  Severe aortic stenosis.



Procedure In Detail:  Mr. Ma is a 70-year-old with severe aortic stenosis by echo, symptomatic, b
rought to the cath lab today.  Given Versed and fentanyl for sedation, prepped and draped in the rout
ine sterile fashion.  A 6-Turkmen sheath was introduced in the right common femoral artery successfull
y.  Angiography there was normal.  Angio-Seal was used to close the case.  The right common femoral v
ein was cannulated with a 7-Turkmen sheath.  A [place]-Stephanie catheter was advanced through the venous shea
th into the inferior vena cava.  Then, the right atrium, right ventricle, pulmonary artery wedge pres
sure, mean pressure of the wedge, and pullback were done.  Pulmonary artery pressures were elevated a
t 72/25.  The RV was 70/5.  Wedge pressure mean was 32.  O2 saturations were done and __________ calli
ry and inferior vena cava.  Cardiac output was measured at 7.9 L/minute.  O2 saturations were pending
.  The [place]-Stephanie catheter was then pulled out.  Left heart catheterization was done using the Say
 catheter 6-Turkmen left and right JL4 and JR4.  He was found to have normal coronaries.  There were n
o complications.  Blood loss was 5 mL.



Postoperative Diagnoses:  Severe aortic stenosis by echo, severe pulmonary hypertension, normal coron
amy, normal cardiac output, normal O2 saturation.



Plan:  Possible TAVR.



Anesthesia:  Total conscious sedation was 60 minutes. 



The patient will go home today.  We will see him in the office soon.  We will start the process for a
 TAVR with Dr. Mena.





NB/ROCHELLE

DD:  04/29/2021 09:14:44Voice ID:  [id number]

DT:  04/29/2021 17:21:22Report ID:  [id number]

## 2022-02-09 ENCOUNTER — HOSPITAL ENCOUNTER (INPATIENT)
Dept: HOSPITAL 97 - ER | Age: 72
LOS: 5 days | Discharge: HOME | DRG: 177 | End: 2022-02-14
Attending: HOSPITALIST | Admitting: HOSPITALIST
Payer: COMMERCIAL

## 2022-02-09 VITALS — BODY MASS INDEX: 43 KG/M2

## 2022-02-09 DIAGNOSIS — D72.829: ICD-10-CM

## 2022-02-09 DIAGNOSIS — I27.20: ICD-10-CM

## 2022-02-09 DIAGNOSIS — J12.82: ICD-10-CM

## 2022-02-09 DIAGNOSIS — I13.0: ICD-10-CM

## 2022-02-09 DIAGNOSIS — Z95.2: ICD-10-CM

## 2022-02-09 DIAGNOSIS — R79.89: ICD-10-CM

## 2022-02-09 DIAGNOSIS — G47.33: ICD-10-CM

## 2022-02-09 DIAGNOSIS — N18.30: ICD-10-CM

## 2022-02-09 DIAGNOSIS — J96.01: ICD-10-CM

## 2022-02-09 DIAGNOSIS — Z95.5: ICD-10-CM

## 2022-02-09 DIAGNOSIS — N17.9: ICD-10-CM

## 2022-02-09 DIAGNOSIS — I25.10: ICD-10-CM

## 2022-02-09 DIAGNOSIS — I48.20: ICD-10-CM

## 2022-02-09 DIAGNOSIS — U07.1: Primary | ICD-10-CM

## 2022-02-09 DIAGNOSIS — I50.33: ICD-10-CM

## 2022-02-09 LAB
HCT VFR BLD CALC: 38.7 % (ref 39.6–49)
INR BLD: 1.34
LYMPHOCYTES # SPEC AUTO: 2 K/UL (ref 0.7–4.9)
PMV BLD: 10.3 FL (ref 7.6–11.3)
RBC # BLD: 4.29 M/UL (ref 4.33–5.43)

## 2022-02-09 PROCEDURE — 86225 DNA ANTIBODY NATIVE: CPT

## 2022-02-09 PROCEDURE — 99285 EMERGENCY DEPT VISIT HI MDM: CPT

## 2022-02-09 PROCEDURE — 80048 BASIC METABOLIC PNL TOTAL CA: CPT

## 2022-02-09 PROCEDURE — 80053 COMPREHEN METABOLIC PANEL: CPT

## 2022-02-09 PROCEDURE — 86160 COMPLEMENT ANTIGEN: CPT

## 2022-02-09 PROCEDURE — 80061 LIPID PANEL: CPT

## 2022-02-09 PROCEDURE — 71045 X-RAY EXAM CHEST 1 VIEW: CPT

## 2022-02-09 PROCEDURE — 84145 PROCALCITONIN (PCT): CPT

## 2022-02-09 PROCEDURE — 82805 BLOOD GASES W/O2 SATURATION: CPT

## 2022-02-09 PROCEDURE — 84443 ASSAY THYROID STIM HORMONE: CPT

## 2022-02-09 PROCEDURE — 96374 THER/PROPH/DIAG INJ IV PUSH: CPT

## 2022-02-09 PROCEDURE — 76770 US EXAM ABDO BACK WALL COMP: CPT

## 2022-02-09 PROCEDURE — 94003 VENT MGMT INPAT SUBQ DAY: CPT

## 2022-02-09 PROCEDURE — 93306 TTE W/DOPPLER COMPLETE: CPT

## 2022-02-09 PROCEDURE — 94002 VENT MGMT INPAT INIT DAY: CPT

## 2022-02-09 PROCEDURE — 76705 ECHO EXAM OF ABDOMEN: CPT

## 2022-02-09 PROCEDURE — 87340 HEPATITIS B SURFACE AG IA: CPT

## 2022-02-09 PROCEDURE — 84439 ASSAY OF FREE THYROXINE: CPT

## 2022-02-09 PROCEDURE — 85025 COMPLETE CBC W/AUTO DIFF WBC: CPT

## 2022-02-09 PROCEDURE — 83880 ASSAY OF NATRIURETIC PEPTIDE: CPT

## 2022-02-09 PROCEDURE — 71250 CT THORAX DX C-: CPT

## 2022-02-09 PROCEDURE — 87522 HEPATITIS C REVRS TRNSCRPJ: CPT

## 2022-02-09 PROCEDURE — 87205 SMEAR GRAM STAIN: CPT

## 2022-02-09 PROCEDURE — 81015 MICROSCOPIC EXAM OF URINE: CPT

## 2022-02-09 PROCEDURE — 80069 RENAL FUNCTION PANEL: CPT

## 2022-02-09 PROCEDURE — 83735 ASSAY OF MAGNESIUM: CPT

## 2022-02-09 PROCEDURE — 83036 HEMOGLOBIN GLYCOSYLATED A1C: CPT

## 2022-02-09 PROCEDURE — 87389 HIV-1 AG W/HIV-1&-2 AB AG IA: CPT

## 2022-02-09 PROCEDURE — 86706 HEP B SURFACE ANTIBODY: CPT

## 2022-02-09 PROCEDURE — 87040 BLOOD CULTURE FOR BACTERIA: CPT

## 2022-02-09 PROCEDURE — 86704 HEP B CORE ANTIBODY TOTAL: CPT

## 2022-02-09 PROCEDURE — 94760 N-INVAS EAR/PLS OXIMETRY 1: CPT

## 2022-02-09 PROCEDURE — 84550 ASSAY OF BLOOD/URIC ACID: CPT

## 2022-02-09 PROCEDURE — 36415 COLL VENOUS BLD VENIPUNCTURE: CPT

## 2022-02-09 PROCEDURE — 85610 PROTHROMBIN TIME: CPT

## 2022-02-09 PROCEDURE — 86317 IMMUNOASSAY INFECTIOUS AGENT: CPT

## 2022-02-09 PROCEDURE — 86021 WBC ANTIBODY IDENTIFICATION: CPT

## 2022-02-09 PROCEDURE — 84484 ASSAY OF TROPONIN QUANT: CPT

## 2022-02-09 PROCEDURE — 80076 HEPATIC FUNCTION PANEL: CPT

## 2022-02-09 PROCEDURE — 82652 VIT D 1 25-DIHYDROXY: CPT

## 2022-02-09 PROCEDURE — 94660 CPAP INITIATION&MGMT: CPT

## 2022-02-09 PROCEDURE — 83520 IMMUNOASSAY QUANT NOS NONAB: CPT

## 2022-02-09 PROCEDURE — 82947 ASSAY GLUCOSE BLOOD QUANT: CPT

## 2022-02-09 PROCEDURE — 84165 PROTEIN E-PHORESIS SERUM: CPT

## 2022-02-09 PROCEDURE — 86038 ANTINUCLEAR ANTIBODIES: CPT

## 2022-02-09 PROCEDURE — 81003 URINALYSIS AUTO W/O SCOPE: CPT

## 2022-02-09 PROCEDURE — 83605 ASSAY OF LACTIC ACID: CPT

## 2022-02-09 PROCEDURE — 83970 ASSAY OF PARATHORMONE: CPT

## 2022-02-09 NOTE — XMS REPORT
Continuity of Care Document

                           Created on:2022



Patient:DEISI RICHTER

Sex:Male

:1950

External Reference #:[id number]





Demographics







                          Address                   [address]



                                                    [place] [address]

 

                          Home Phone                [phone]

 

                          Work Phone                [phone]

 

                          Mobile Phone              [phone]

 

                          Email Address             [email or web address]

 

                          Preferred Language        en

 

                          Marital Status            Unknown

 

                          Caodaism Affiliation     Unknown

 

                          Race                      Unknown

 

                          Additional Race(s)        White



                                                    Unavailable

 

                          Ethnic Group              Unknown









Author







                          Organization              [facility]

t

 

                          Address                   [address]



                                                    [place] [address]

 

                          Phone                     [phone]









Support







                Name            Relationship    Address         Phone

 

                ROBER          Unavailable     [address]  [phone]



                                                [place] [address] 

 

                ROBER          Unavailable     [address]  [phone]



                                                [place] [address] 

 

                ROBER          Unavailable     [address]  [phone]



                                                [place] [address] 

 

                NATACHA        Unavailable     [address] [facility]  [phone]



                                                [place] [address] 









Care Team Providers







                    Name                Role                Phone

 

                    Trina              Attending Clinician Unavailable

 

                    HUMPHREY Maynard        Attending Clinician Unavailable

 

                    [place] VIANCA       Attending Clinician [phone]

 

                    MECHE Marmolejo           Attending Clinician Unavailable

 

                    Tano Muhammad    Attending Clinician Unavailable

 

                    ANKUSH Mosqueda        Attending Clinician Unavailable

 

                    Physician,  Primary or Family Admitting Clinician UnavailHUMPHREY Mccallum        Admitting Clinician Unavailable

 

                    Trina              Admitting Clinician Unavailable

 

                    Tano Muhammad    Admitting Clinician Unavailable

 

                    Referred            Admitting Clinician Unavailable









Payers







           Payer Name Policy Type Policy Number Effective Date Expiration Date S

ource







Problems







       Condition Condition Condition Status Onset  Resolution Last   Treating Co

mments 

Source



       Name   Details Category        Date   Date   Treatment Clinician        



                                                 Date                 

 

       Complete Complete Disease Active                              [facility]



       heart  heart                                               [facility]



       block  block                00:00:                             



                                   00                                 

 

       Pacemaker Pacemaker Disease Active                              [facility]



                                                                  [facility]



                                   00:00:                             



                                   00                                 

 

       Aortic Aortic Disease Active                              [facility]



       stenosis stenosis                                              Health



                                   00:00:                             



                                   00                                 

 

       Paroxysmal Paroxysmal Disease Active                              [facility]

T



       atrial atrial                                              Health



       fibrillati fibrillati               00:00:                             



       on     on                   00                                 

 

       Presence Presence Disease Active                              [facility]



       of     of                                                  Health



       Watchman Watchman               00:00:                             



       left   left                 00                                 



       atrial atrial                                                  



       appendage appendage                                                  



       closure closure                                                  



       device device                                                  

 

       S/P TAVR S/P TAVR Disease Active                              [facility]



       (transcath (transcath                                              He

alth



       eter   eter                 00:00:                             



       aortic aortic               00                                 



       valve  valve                                                   



       replacemen replacemen                                                  



       t)     t)                                                      







Allergies, Adverse Reactions, Alerts







       Allergy Allergy Status Severity Reaction(s) Onset  Inactive Treating Comm

ents 

Source



       Name   Type                        Date   Date   Clinician        

 

       Penicill Propensi Active        Rash                         [facility]



       ins    ty to                                               Health



              adverse                      00:00:                      



              reaction                      00                          



              s                                                       

 

       Penicill DA     Active [place]                                  [facility]



       ins                                                        Clear



                                          00:00:                      [place]



                                          00                          [facility]

 

       Penicill DA     Active MO     RASH                         [facility]



       ins                                                        Clear



                                          00:00:                      [place]



                                          00                          [facility]

 

       Penicill DA     Active U      UNKNOWN                       [facility]



       ins                                                        Clear



                                          00:00:                      [place]



                                          00                          [facility]

 

       Penicill DA     Active U                                   [facility]



       ins                                                        Clear



                                          00:00:                      [place]



                                          00                          [facility]

 

       Penicill DA     Active U                                   [facility]



       ins                                                        Clear



                                          00:00:                      [place]



                                          00                          [facility]

 

       Penicill DA     Active U      UNKNOWN                       [facility]



       ins                                20                        Clear



                                          00:00:                      [place]



                                          00                          [facility]







Social History







           Social Habit Start Date Stop Date  Quantity   Comments   Source

 

           Exposure to                       Not sure              [facility]



           SARS-CoV-2 (event)                                             

 

           Tobacco use and 2021 Never used            [facility]



           exposure   00:00:00   00:00:00                         

 

           Alcohol intake 2021 Ex-drinker            [facility]



                      00:00:00   00:00:00   (finding)             

 

           Sex Assigned At 1950 M                     [facility]



           Birth      00:00:00   00:00:00                         









                Smoking Status  Start Date      Stop Date       Source

 

                Never smoker                                    [facility]







Medications







       Ordered Filled Start  Stop   Current Ordering Indication Dosage Frequency

 Signature

                    Comments            Components          Source



     Medication Medication Date Date Medication? Clinician                (SIG) 

          



     Name Name                                                   

 

     Multiple            Yes                      Take by           [facility]



     Vitamins-Mi                                     mouth.           Health



     nerals      18:50:                                              



     (CENTRUM      22                                                



     SILVER PO)                                                        

 

     Multiple            Yes                      Take by           [facility]



     Vitamins-Mi                                     mouth.           Health



     nerals      18:50:                                              



     (CENTRUM      22                                                



     SILVER PO)                                                        

 

     BABY            Yes            81mg QD   Take 81 mg           [facility]



     ASPIRIN PO                                     by mouth 1           [facility]

[facility]



               18:45:                               (one) time           



               58                                 each day.           

 

     BABY            Yes            81mg QD   Take 81 mg           [facility]



     ASPIRIN PO      7-07                               by mouth 1           [facility]

[facility]



               18:45:                               (one) time           



               58                                 each day.           

 

     Xarelto 20            Yes            40mg QD   Take 40 mg           [facility]

T



     MG tablet      6-24                               by mouth 1           [facility]

th



               00:00:                               (one) time           



               00                                 each day.           

 

     Xarelto 20            Yes            40mg QD   Take 40 mg           U

T



     MG tablet      6-24                               by mouth 1           Heal

th



               00:00:                               (one) time           



               00                                 each day.           

 

     pantoprazol            Yes                                     [facility]



     e         6-                                              Health



     (ProtoNix)      00:00:                                              



     40 MG EC      00                                                



     tablet                                                        

 

     rosuvastati            Yes                                     [facility]



     n (Crestor)      622                                              Health



     20 MG      00:00:                                              



     tablet      00                                                

 

     pantoprazol            Yes                                     [facility]



     e         6-22                                              Health



     (ProtoNix)      00:00:                                              



     40 MG EC      00                                                



     tablet                                                        

 

     rosuvastati            Yes                                     [facility]



     n (Crestor)      6-22                                              Health



     20 MG      00:00:                                              



     tablet      00                                                

 

     clopidogrel            Yes                                     [facility]



     (Plavix) 75      6-09                                              Health



     MG tablet      00:00:                                              



               00                                                

 

     clopidogrel      0      Yes                                     [facility]



     (Plavix) 75      6-09                                              Health



     MG tablet      00:00:                                              



               00                                                

 

     amiodarone            Yes                                     [facility]



     (Pacerone)      6-02                                              Health



     200 MG      00:00:                                              



     tablet      00                                                

 

     amiodarone      0      Yes                                     [facility]



     (Pacerone)      6-02                                              Health



     200 MG      00:00:                                              



     tablet      00                                                

 

     nitroglycer            Yes                      PLEASE SEE           

[facility]



     in                                       [facility]



     (Nitrostat)      00:00:                               FOR            



     0.4 MG SL      00                                 DETAILED           



     tablet                                         DIRECTIONS           

 

     nitroglycer            Yes                      PLEASE SEE           

[facility]



     in                                       [facility]           Health



     (Nitrostat)      00:00:                               FOR            



     0.4 MG SL      00                                 DETAILED           



     tablet                                         DIRECTIONS           

 

     furosemide      2020      Yes                      TAKE 1           [facility]



     (Lasix) 40      0-19                               TABLET BY           Heal

th



     MG tablet      00:00:                               ORAL ROUTE           



               00                                 EVERY DAY           



                                                  IF NEEDED           



                                                  FOR            



                                                  SWELLING           

 

     furosemide      2020      Yes                      TAKE 1           [facility]



     (Lasix) 40      0-19                               TABLET BY           Heal

th



     MG tablet      00:00:                               ORAL ROUTE           



               00                                 EVERY DAY           



                                                  IF NEEDED           



                                                  FOR            



                                                  SWELLING           







Vital Signs







             Vital Name   Observation Time Observation Value Comments     Source

 

             Systolic blood pressure 2021 18:40:00 131 mm[Hg]             

   [facility]

 

             Diastolic blood pressure 2021 18:40:00 79 mm[Hg]             

    [facility]

 

             Heart rate   2021 18:40:00 73 /min                   [facility]

h

 

             Body height  2021 18:40:00 177.8 cm                  [facility]

h

 

             Body weight  2021 18:40:00 136.896 kg                [facility]

h

 

             BMI          2021 18:40:00 43.30 kg/m2               [facility]

h

 

             Systolic blood pressure 2021 18:40:00 131 mm[Hg]             

   [facility]

 

             Diastolic blood pressure 2021 18:40:00 79 mm[Hg]             

    [facility]

 

             Heart rate   2021 18:40:00 73 /min                   [facility]

h

 

             Body height  2021 18:40:00 177.8 cm                  [facility]

h

 

             Body weight  2021 18:40:00 136.896 kg                [facility]

h

 

             BMI          2021 18:40:00 43.30 kg/m2               [facility]

h







Procedures







                Procedure       Date / Time Performed Performing Clinician Mandie turner

 

                [id number]         2021 00:00:00 LIMMA.03        [facility]

 

                [id number]         2021 00:00:00 LIMMA.03        [facility]

 

                [id number]         2021 00:00:00 LIMMA.03        [facility]

 

                [id number]         2021 00:00:00 RASSA           [facility]

 

                [id number]         2021 00:00:00 RASSA           [facility]

 

                [id number]         2021 00:00:00 CHAAB.01        [facility]

 

                [id number]         2021 00:00:00 CHAAB.01        [facility]







Encounters







        Start   End     Encounter Admission Attending Care    Care    Encounter 

Source



        Date/Time Date/Time Type    Type    Clinicians Facility Department ID   

   

 

        2021         Inpatient GILDARDO      Trina, JOHNATHAN   OUT[facility]    [id number] 

[facility]



        10:00:00                         Momo                  [id number]  [facility]

 

        2021         Inpatient EL      JOHNATHAN Mena   OUTD    [id number] 

[facility]



        14:30:00                         Momo                  [id number]  [facility]

 

        2021         Inpatient JOHNATHAN Frank   [facility]   [id number]

0 [facility]



        10:00:00                         Aida                 [id number]  [facility]

 

        2021 Outpatient JOHNATHAN Arenas   [facility]    [id number]

-20 [facility]



        05:29:00 05:29:00                 Momo                  [id number]  [facility]

 

        2021 Office          [facility]     [phone] [id number]

[id number] 



        13:11:48 13:53:00 Visit           Candis ACOSTA [id number]         



                                                CLINIC  [id number]         



                                                        [phone]         



                                                        1               

 

        2021 Office          [facility]     [phone] [id number]

[id number] [place]



        13:11:48 13:53:00 Visit           Candis ACOSTA [id number]         H

[facility]



                                                CLINIC  [id number]         



                                                        [phone]         



                                                        1               

 

        2021 Inpatient JOHNATHAN Cuenca   [facility]    [id number]

20 [facility]



        09:45:00 12:00:00                 Rosy                 [id number]  [facility]

 

        2021 2021-06-10 Inpatient JÚNIOR BrinkCL   [facility]    [id number]

-20 [facility]



        09:13:00 17:55:00                 Reji                   [id number]  [facility]

 

        2021 Outpatient         JÚNIOR MosquedaCL   [facility]    [id number]

-20 [facility]



        13:00:00 13:00:00                 Dana                 [id number]  [facility]







Results







           Test Description Test Time  Test Comments Results    Result Comments 

Source









                    Novel Coronavirus 2019 Inhouse 2021 23:16:00 









                      Test Item  Value      Reference Range Interpretation Comme

nts









             Novel Coronavirus  Negative     Negative                  Posit

madeline results are indicative of the



             Inhouse (test code =                                        presenc

e [id number] RNA, clinical



             COVNONPUI)                                          correlation wit

h patient historyand



                                                                 other diagnosti

c information is



                                                                 necessary to de

terminepatient infection



                                                                 status. Positiv

e results do not rule



                                                                 outbacterial in

fection or co-infection



                                                                 with other viru

ses. Negative results do



                                                                 not preclude SA

RS-CoV-2 infection



                                                                 andshould not b

e used as the sole basis



                                                                 for patient man

agementdecisions.



                                                                 Negative result

s must be combined with



                                                                 otherclinical o

bservations, patient



                                                                 history, and ep

idemiologicalinformation.



                                                                 Detection of SA

RS-CoV-2 RNA may be



                                                                 affected bysamp

le collection methods,



                                                                 storage conditi

ons, and/or stageof



                                                                 infection. Marta

l RNA mutations,



                                                                 vaccinations, a

ntiviraltherapeutics,



                                                                 antibiotics, ch

emotherapeutic



                                                                 orimmunosuppres

sally drugs have not been



                                                                 evaluated for e

ffectson detection.



                                                                 Results are for

 the identification of



                                                                 SARS-CoV-2 RNA 

usingthe [employer] 



                                                                 System under th

e FDA Emergency



                                                                 UseAuthorizatio

n.  The testing is



                                                                 performed by genaro camprained in the



                                                                 procedures for 

the [employer] 



                                                                 moleculardiagno

stic SARS-CoV-2 assay in



                                                                 vitro.



BASIC METABOLIC [id number] 11:07:00





             Test Item    Value        Reference Range Interpretation Comments

 

             SODIUM (test code = NA) 143 mEq/L    134-147      N            

 

             POTASSIUM (test code = 4.0 mEq/L    3.4-5.0      N            



             K)                                                  

 

             CHLORIDE (test code = 107 mEq/L    100-108      N            



             CL)                                                 

 

             CARBON DIOXIDE (test 27 mEq/l     21-33        N            



             code = CO2)                                         

 

             ANION GAP (test code = 13           0-20         N            



             GAP)                                                

 

             GLUCOSE (test code = 136 mg/dL           H            



             GLU)                                                

 

             BLOOD UREA NITROGEN 16 mg/dL     7-18         N            



             (test code = BUN)                                        

 

             GLOMERULAR FILTRATION 59.5         70-80        L            Units 

of measure =



             RATE (test code = GFR)                                        ml/mi

n/1.73 m2

 

             CREATININE (test code = 1.2 mg/dL    0.6-1.3      N            



             CREAT)                                              

 

             CALCIUM (test code = 8.9 mg/dL    8.0-10.5     N            



             CA)                                                 



PROTHROMBIN [id number] 11:03:00





             Test Item    Value        Reference Range Interpretation Comments

 

             PROTHROMBIN TIME 19.5 SECONDS 9.3-12.9     H            



             PATIENT (test code =                                        



             PTP)                                                

 

             INTERNATIONAL NORMAL 1.8          0.8-1.2      H                   

         TARGET



             RATIO (test code =                                        INR BY IN

DICATION



             INR)                                                Indication



                                                                 



                                                                   INR1. Prophyl

axis of



                                                                 venous thrombos

is



                                                                        2.0 - 3.

0



                                                                 (orthopedic cande

amaya),



                                                                 Prophylaxis of



                                                                 venous thrombos

is



                                                                 (other than hig

h-risk



                                                                  surgery), Mary

tment



                                                                 of Deep Vein



                                                                 Thrombosis/Pulm

onary



                                                                 Embolism, Preve

ntion



                                                                 of systemic emb

olism -



                                                                 Tissue heart va

lves,



                                                                 Acute Myocardia

l



                                                                 Infarction (to 

prevent



                                                                   systemic embo

lism),



                                                                 Valvular heart



                                                                 disease,   Atri

al



                                                                 Fibrillation,



                                                                 Bileaflet mecha

nical



                                                                 valve in aortic



                                                                 position.2. Mec

hanical



                                                                 prosthetic valv

es



                                                                 (high risk),   

 2.5 -



                                                                 3.5   Presence 

of



                                                                 Lupus Anticoagu

lant or



                                                                   Antiphospholi

pid



                                                                 Antibodies, Pre

vention



                                                                 of   systemic e

mbolism



                                                                 - Acute Myocard

ial



                                                                 Infarction   (t

o



                                                                 prevent recurre

nt



                                                                 infarct).



CBC W/AUTO [id number] 10:57:00





             Test Item    Value        Reference Range Interpretation Comments

 

             WHITE BLOOD CELL (test code = 9.0 x10 3/uL 4.5-11.0     N          

  



             WBC)                                                

 

             RED BLOOD CELL (test code = 4.47 x10 6/uL 4.00-5.60    N           

 



             RBC)                                                

 

             HEMOGLOBIN (test code = HGB) 12.4 g/dL    12.5-16.9    L           

 

 

             HEMATOCRIT (test code = HCT) 39.3 %       37.5-50.7    N           

 

 

             MEAN CELL VOLUME (test code = 87.9 fL      81.0-99.0    N          

  



             MCV)                                                

 

             MEAN CELL HGB (test code = MCH) 27.7 pg      27.0-33.0    N        

    

 

             MEAN CELL HGB CONCETRATION 31.6 g/dL    33.0-37.0    L            



             (test code = MCHC)                                        

 

             RED CELL DISTRIBUTION WIDTH CV 14.6 %       11.5-14.5    H         

   



             (test code = RDW)                                        

 

             RED CELL DISTRIBUTION WIDTH SD 47.0 fL      37.0-54.0    N         

   



             (test code = RDW-SD)                                        

 

             PLATELET COUNT (test code = 123 x10 3/uL 150-400      L            



             PLT)                                                

 

             MEAN PLATELET VOLUME (test code 11.2 fL      7.0-9.0      H        

    



             = MPV)                                              

 

             NEUTROPHIL % (test code = NT%) 73.7 %       56.0-77.0    N         

   

 

             IMMATURE GRANULOCYTE % (test 1.0 %        0.0-2.0      N           

 



             code = IG%)                                         

 

             LYMPHOCYTE % (test code = LY%) 13.2 %       14.0-32.0    L         

   

 

             MONOCYTE % (test code = MO%) 8.0 %        4.8-9.0      N           

 

 

             EOSINOPHIL % (test code = EO%) 3.1 %        0.3-3.7      N         

   

 

             BASOPHIL % (test code = BA%) 1.0 %        0.0-2.0      N           

 

 

             NUCLEATED RBC % (test code = 0.0 %        0-0          N           

 



             NRBC%)                                              

 

             NEUTROPHIL # (test code = NT#) 6.65 x10 3/uL 2.0-7.6      N        

    

 

             IMMATURE GRANULOCYTE # (test 0.09 x10 3/uL 0.00-0.03    H          

  



             code = IG#)                                         

 

             LYMPHOCYTE # (test code = LY#) 1.19 x10 3/uL 1.0-3.8      N        

    

 

             MONOCYTE # (test code = MO#) 0.72 x10 3/uL 0.1-0.8      N          

  

 

             EOSINOPHIL # (test code = EO#) 0.28 x10 3/uL 0.0-0.2      H        

    

 

             BASOPHIL # (test code = BA#) 0.09 x10 3/uL 0.0-0.2      N          

  

 

             NUCLEATED RBC # (test code = 0.00 x10 3/uL 0.0-0.1      N          

  



             NRBC#)                                              

 

             MANUAL DIFF REQUIRED (test code NO                                 

    



             = MDIFF)                                            



CBC W/AUTO [id number] 10:47:00





             Test Item    Value        Reference Range Interpretation Comments

 

             WHITE BLOOD CELL (test code = WBC)  x10 3/uL    4.5-11.0           

       

 

             RED BLOOD CELL (test code = RBC)  x10 6/uL    4.00-5.60            

     

 

             HEMOGLOBIN (test code = HGB)  g/dL        12.5-16.9                

 

 

             HEMATOCRIT (test code = HCT) 39.3 %       37.5-50.7    N           

 

 

             MEAN CELL VOLUME (test code = MCV)  fL          81.0-99.0          

       

 

             MEAN CELL HGB (test code = MCH)  pg          27.0-33.0             

    

 

             MEAN CELL HGB CONCETRATION (test  g/dL        33.0-37.0            

     



             code = MCHC)                                        

 

             RED CELL DISTRIBUTION WIDTH CV  %           11.5-14.5              

   



             (test code = RDW)                                        

 

             PLATELET COUNT (test code = PLT)  x10 3/uL    150-400              

     

 

             NEUTROPHIL % (test code = NT%)  %           56.0-77.0              

   

 

             LYMPHOCYTE % (test code = LY%)  %           14.0-32.0              

   

 

             NEUTROPHIL # (test code = NT#)  x10 3/uL    2.0-7.6                

   

 

             LYMPHOCYTE # (test code = LY#)  x10 3/uL    1.0-3.8                

   

 

             MANUAL DIFF REQUIRED (test code =                                  

      



             MDIFF)                                              



CBC W/AUTO [id number] 04:53:00





             Test Item    Value        Reference Range Interpretation Comments

 

             WHITE BLOOD CELL (test code = 13.1 x10 3/uL 4.5-11.0     H         

   



             WBC)                                                

 

             RED BLOOD CELL (test code = 3.16 x10 6/uL 4.00-5.60    L           

 



             RBC)                                                

 

             HEMOGLOBIN (test code = HGB) 9.4 g/dL     12.5-16.9    L           

 

 

             HEMATOCRIT (test code = HCT) 30.2 %       37.5-50.7    L           

 

 

             MEAN CELL VOLUME (test code = 95.6 fL      81.0-99.0    N          

  



             MCV)                                                

 

             MEAN CELL HGB (test code = 29.7 pg      27.0-33.0    N            



             MCH)                                                

 

             MEAN CELL HGB CONCETRATION 31.1 g/dL    33.0-37.0    L            



             (test code = MCHC)                                        

 

             RED CELL DISTRIBUTION WIDTH CV 15.2 %       11.5-14.5    H         

   



             (test code = RDW)                                        

 

             RED CELL DISTRIBUTION WIDTH SD 52.9 fL      37.0-54.0    N         

   



             (test code = RDW-SD)                                        

 

             PLATELET COUNT (test code = 99 x10 3/uL  150-400      L            



             PLT)                                                

 

             MEAN PLATELET VOLUME (test 11.3 fL      7.0-9.0      H            



             code = MPV)                                         

 

             NEUTROPHIL % (test code = NT%) 80.1 %       56.0-77.0    H         

   

 

             IMMATURE GRANULOCYTE % (test 1.2 %        0.0-2.0      N           

 



             code = IG%)                                         

 

             LYMPHOCYTE % (test code = LY%) 7.7 %        14.0-32.0    L         

   

 

             MONOCYTE % (test code = MO%) 8.0 %        4.8-9.0      N           

 

 

             EOSINOPHIL % (test code = EO%) 2.5 %        0.3-3.7      N         

   

 

             BASOPHIL % (test code = BA%) 0.5 %        0.0-2.0      N           

 

 

             NUCLEATED RBC % (test code = 0.0 %        0-0          N           

 



             NRBC%)                                              

 

             NEUTROPHIL # (test code = NT#) 10.48 x10 3/uL 2.0-7.6      H       

     

 

             IMMATURE GRANULOCYTE # (test 0.16 x10 3/uL 0.00-0.03    H          

  



             code = IG#)                                         

 

             LYMPHOCYTE # (test code = LY#) 1.01 x10 3/uL 1.0-3.8      N        

    

 

             MONOCYTE # (test code = MO#) 1.05 x10 3/uL 0.1-0.8      H          

  

 

             EOSINOPHIL # (test code = EO#) 0.33 x10 3/uL 0.0-0.2      H        

    

 

             BASOPHIL # (test code = BA#) 0.06 x10 3/uL 0.0-0.2      N          

  

 

             NUCLEATED RBC # (test code = 0.00 x10 3/uL 0.0-0.1      N          

  



             NRBC#)                                              

 

             MANUAL DIFF REQUIRED (test NO                                     



             code = MDIFF)                                        



PLT [id number] 04:53:00





             Test Item    Value        Reference Range Interpretation Comments

 

             PLATELET ESTIMATE (test code 104-130 THOUSAND ADEQUATE             

     



             = PLTEST)                                           

 

             PLATELET MORPHOLOGY (test NORMAL                                 



             code = PLTMORPH)                                        



COMPREHENSIVE METABOLIC [id number] 04:48:00





             Test Item    Value        Reference Range Interpretation Comments

 

             SODIUM (test code = NA) 140 mEq/L    134-147      N            

 

             POTASSIUM (test code = 4.1 mEq/L    3.4-5.0      N            



             K)                                                  

 

             CHLORIDE (test code = 112 mEq/L    100-108      H            



             CL)                                                 

 

             CARBON DIOXIDE (test 24 mEq/l     21-33        N            



             code = CO2)                                         

 

             ANION GAP (test code = 8            0-20         N            



             GAP)                                                

 

             GLUCOSE (test code = 120 mg/dL           H            



             GLU)                                                

 

             BLOOD UREA NITROGEN 15 mg/dL     7-18         N            



             (test code = BUN)                                        

 

             GLOMERULAR FILTRATION 73.5         70-80        N            Units 

of measure =



             RATE (test code = GFR)                                        ml/mi

n/1.73 m2

 

             CREATININE (test code = 1.0 mg/dL    0.6-1.3      N            



             CREAT)                                              

 

             TOTAL PROTEIN (test 6.1 g/dL     6.4-8.2      L            



             code = PROT)                                        

 

             ALBUMIN (test code = 3.40 g/dL    3.4-5.0      N            



             ALB)                                                

 

             CALCIUM (test code = 7.9 mg/dL    8.0-10.5     L            



             CA)                                                 

 

             BILIRUBIN TOTAL (test 0.60 mg/dL   0.0-1.0      N            



             code = BILT)                                        

 

             SGOT/AST (test code = 27 IUnit/L   15-37        N            



             AST)                                                

 

             SGPT/ALT (test code = 36 IUnit/L   30-65        N            



             ALT)                                                

 

             ALKALINE PHOSPHATASE 44 IUnit/L          N            



             TOTAL (test code =                                        



             ALKP)                                               



CBC W/AUTO [id number] 04:32:00





             Test Item    Value        Reference Range Interpretation Comments

 

             WHITE BLOOD CELL (test code = 13.1 x10 3/uL 4.5-11.0     H         

   



             WBC)                                                

 

             RED BLOOD CELL (test code = 3.16 x10 6/uL 4.00-5.60    L           

 



             RBC)                                                

 

             HEMOGLOBIN (test code = HGB) 9.4 g/dL     12.5-16.9    L           

 

 

             HEMATOCRIT (test code = HCT) 30.2 %       37.5-50.7    L           

 

 

             MEAN CELL VOLUME (test code = 95.6 fL      81.0-99.0    N          

  



             MCV)                                                

 

             MEAN CELL HGB (test code = 29.7 pg      27.0-33.0    N            



             MCH)                                                

 

             MEAN CELL HGB CONCETRATION 31.1 g/dL    33.0-37.0    L            



             (test code = MCHC)                                        

 

             RED CELL DISTRIBUTION WIDTH CV 15.2 %       11.5-14.5    H         

   



             (test code = RDW)                                        

 

             RED CELL DISTRIBUTION WIDTH SD 52.9 fL      37.0-54.0    N         

   



             (test code = RDW-SD)                                        

 

             PLATELET COUNT (test code = 99 x10 3/uL  150-400      L            



             PLT)                                                

 

             MEAN PLATELET VOLUME (test 11.3 fL      7.0-9.0      H            



             code = MPV)                                         

 

             NEUTROPHIL % (test code = NT%) 80.1 %       56.0-77.0    H         

   

 

             IMMATURE GRANULOCYTE % (test 1.2 %        0.0-2.0      N           

 



             code = IG%)                                         

 

             LYMPHOCYTE % (test code = LY%) 7.7 %        14.0-32.0    L         

   

 

             MONOCYTE % (test code = MO%) 8.0 %        4.8-9.0      N           

 

 

             EOSINOPHIL % (test code = EO%) 2.5 %        0.3-3.7      N         

   

 

             BASOPHIL % (test code = BA%) 0.5 %        0.0-2.0      N           

 

 

             NUCLEATED RBC % (test code = 0.0 %        0-0          N           

 



             NRBC%)                                              

 

             NEUTROPHIL # (test code = NT#) 10.48 x10 3/uL 2.0-7.6      H       

     

 

             IMMATURE GRANULOCYTE # (test 0.16 x10 3/uL 0.00-0.03    H          

  



             code = IG#)                                         

 

             LYMPHOCYTE # (test code = LY#) 1.01 x10 3/uL 1.0-3.8      N        

    

 

             MONOCYTE # (test code = MO#) 1.05 x10 3/uL 0.1-0.8      H          

  

 

             EOSINOPHIL # (test code = EO#) 0.33 x10 3/uL 0.0-0.2      H        

    

 

             BASOPHIL # (test code = BA#) 0.06 x10 3/uL 0.0-0.2      N          

  

 

             NUCLEATED RBC # (test code = 0.00 x10 3/uL 0.0-0.1      N          

  



             NRBC#)                                              

 

             MANUAL DIFF REQUIRED (test NO                                     



             code = MDIFF)                                        



PLT [id number] 04:32:00





             Test Item    Value        Reference Range Interpretation Comments

 

             PLATELET ESTIMATE (test code =  THOUSAND    ADEQUATE               

   



             PLTEST)                                             



CBC W/AUTO [id number] 04:32:00





             Test Item    Value        Reference Range Interpretation Comments

 

             WHITE BLOOD CELL (test code = 13.1 x10 3/uL 4.5-11.0     H         

   



             WBC)                                                

 

             RED BLOOD CELL (test code = 3.16 x10 6/uL 4.00-5.60    L           

 



             RBC)                                                

 

             HEMOGLOBIN (test code = HGB) 9.4 g/dL     12.5-16.9    L           

 

 

             HEMATOCRIT (test code = HCT) 30.2 %       37.5-50.7    L           

 

 

             MEAN CELL VOLUME (test code = 95.6 fL      81.0-99.0    N          

  



             MCV)                                                

 

             MEAN CELL HGB (test code = 29.7 pg      27.0-33.0    N            



             MCH)                                                

 

             MEAN CELL HGB CONCETRATION 31.1 g/dL    33.0-37.0    L            



             (test code = MCHC)                                        

 

             RED CELL DISTRIBUTION WIDTH CV 15.2 %       11.5-14.5    H         

   



             (test code = RDW)                                        

 

             RED CELL DISTRIBUTION WIDTH SD 52.9 fL      37.0-54.0    N         

   



             (test code = RDW-SD)                                        

 

             PLATELET COUNT (test code = 99 x10 3/uL  150-400      L            



             PLT)                                                

 

             MEAN PLATELET VOLUME (test 11.3 fL      7.0-9.0      H            



             code = MPV)                                         

 

             NEUTROPHIL % (test code = NT%) 80.1 %       56.0-77.0    H         

   

 

             IMMATURE GRANULOCYTE % (test 1.2 %        0.0-2.0      N           

 



             code = IG%)                                         

 

             LYMPHOCYTE % (test code = LY%) 7.7 %        14.0-32.0    L         

   

 

             MONOCYTE % (test code = MO%) 8.0 %        4.8-9.0      N           

 

 

             EOSINOPHIL % (test code = EO%) 2.5 %        0.3-3.7      N         

   

 

             BASOPHIL % (test code = BA%) 0.5 %        0.0-2.0      N           

 

 

             NUCLEATED RBC % (test code = 0.0 %        0-0          N           

 



             NRBC%)                                              

 

             NEUTROPHIL # (test code = NT#) 10.48 x10 3/uL 2.0-7.6      H       

     

 

             IMMATURE GRANULOCYTE # (test 0.16 x10 3/uL 0.00-0.03    H          

  



             code = IG#)                                         

 

             LYMPHOCYTE # (test code = LY#) 1.01 x10 3/uL 1.0-3.8      N        

    

 

             MONOCYTE # (test code = MO#) 1.05 x10 3/uL 0.1-0.8      H          

  

 

             EOSINOPHIL # (test code = EO#) 0.33 x10 3/uL 0.0-0.2      H        

    

 

             BASOPHIL # (test code = BA#) 0.06 x10 3/uL 0.0-0.2      N          

  

 

             NUCLEATED RBC # (test code = 0.00 x10 3/uL 0.0-0.1      N          

  



             NRBC#)                                              

 

             MANUAL DIFF REQUIRED (test NO                                     



             code = MDIFF)                                        



PLT [id number] 04:32:00





             Test Item    Value        Reference Range Interpretation Comments

 

             PLATELET ESTIMATE (test code =  THOUSAND    ADEQUATE               

   



             PLTEST)                                             



[id number] 20:15:00





             Test Item    Value        Reference Range Interpretation Comments

 

             GLUBED (test code = 171 MG/DL           H            Performe

d by certified



             GLUBED)                                              at  [facility]



- XR CHEST 1 -[id number] 16:52:00
********************************[facility]****[facility]        : 1950        Sex: 
M************************************************************ FAX: Momo Dawson MD    [phone]    [place]:   St: ADM FAX:         Hermelinda Galaviz  [place] [phone]   
------------------------------------------------------------------------------- 
 Name:   DEISI RICHTER                  [facility]                    
: 1950 Age/S: 71/M           [address] [place] [address]         Unit #: 
[id number]      Loc: [address] [address] [address]                 Phys: 
Viktoria Galavizmar GONSALEZ   Acct: [id number] Dis Date:               Status: ADM IN
                                 PHONE #: [phone]     Exam Date:     
2021     1134                   FAX #: [phone]     Reason: S/P 
PACEMAKER INSERTION                            EXAMS:     CPT CODE:      
[id number] XR CHEST 1 V                               [id number]                    
SINGLE VIEW RADIOGRAPH CHEST               INDICATION:  S/P PACEMAKER INSERTION.
               TECHNIQUE: A single view frontal radiograph of the chest was 
obtained.               COMPARISONS: Chest x-ray2021               
FINDINGS:                There is no acute osseous fracture or dislocation.There
 are       advanced degenerative changes of both shoulders.               There 
is no subdiaphragmatic free gas.               There is a new 2-lead cardiac 
pacemaker with control device in the   left chest soft tissues.  The leads are 
intact and appear to terminate       in normal positions.              There is 
stable cardiomegaly.  There is no mediastinal widening.        There is no pne
umomediastinum. There is moderate atherosclerotic       vascular calcification 
of the aorta.  There is a stable prosthetic       aortic valve.               
There are stable mild interstitial marking prominence.  There is       stable 
perihilar vascular congestion.  There is no pneumothorax.        There is a 
stable small right pleural effusion.  There is no pulmonary       lobar 
consolidation.          IMPRESSION:                   1.  There is a new 2-lead 
cardiac pacemaker device without evidence of         complication.         2.  
There is stable cardiomegaly and mild interstitial edema        suggesting 
congestive heart failure.         3.  There is a stable small right pleural 
effusion.  There is no         pneumothorax.                    ** 
Electronically Signed by NESTOR Frank **            **            on 
2021 at 1652            **                      Reported and signed by: 
Robert Frank D.O.       PAGE  1                       Signed Report(CONTINUED)
  FAX: Momo Dawson MD    [phone]    [place]:   St: ADM FAX:    
     Hermelinda Galaviz [phone]   
------------------------------------------------------------------------------- 
 Name:   DEISI RICHTER                  [facility]                    
: 1950  Age/S: 71/M           [address] [place], [place] [address]vd         Unit #: 
[id number]      Loc: [address]       [place] [address]                 Phys: 
Hermelinda Galaviz NP     Acct: [id number] Dis Date:               Status: ADM 
IN                                 PHONE#: [phone]     Exam Date:     
2021                   FAX #: [phone]     Reason: S/P 
PACEMAKER INSERTION                            EXAMS:       CPT CODE:      
[id number] XR CHEST 1 V                               [address]               &lt;Co
ntinued&gt;                                       CC: Momo Mena MD; Hermelinda Galaviz NP                                                                     
                    Technologist: RT Imelda(R)                          
         Trnscrd Date/Time/By: 2021 () : By: [email or web address]          Orig 
Print D/T: S: 2021 (497)                         PAGE  2            
Signed [id number] 09:39:00





             Test Item    Value        Reference Range Interpretation Comments

 

             ACT-ISTAT (test code 136 SEC             N            Perform

ed by certified



             = ACTI)                                              at  [facility]



[id number] 08:28:00





             Test Item    Value        Reference Range Interpretation Comments

 

             ACT-ISTAT (test code 274 SEC             H            Perform

ed by certified



             = ACTI)                                              at  [facility]



Novel Coronavirus 2019 [id number] 06:28:00





             Test Item    Value        Reference Range Interpretation Comments

 

             Novel Coronavirus Negative     Negative                  Positive r

esults are



             2019 Inhouse (test                                        indicativ

e of the presence



             code = COVNONPUI)                                        ofSARS-CoV

-2 RNA, clinical



                                                                 correlation wit

h patient



                                                                 historyand othe

r



                                                                 diagnostic info

rmation is



                                                                 necessary to



                                                                 determinepatien

t infection



                                                                 status. Positiv

e results



                                                                 do not rule out

bacterial



                                                                 infection or co

-infection



                                                                 with other viru

ses.



                                                                 Negative result

s do not



                                                                 preclude SARS-C

oV-2



                                                                 infection andsh

ould not be



                                                                 used as the sol

e basis for



                                                                 patient



                                                                 managementdecis

ions.



                                                                 Negative result

s must be



                                                                 combined with



                                                                 otherclinical



                                                                 observations, p

atient



                                                                 history, and



                                                                 epidemiological

information



                                                                 . Detection of 

SARS-CoV-2



                                                                 RNA may be affe

cted



                                                                 bysample collec

tion



                                                                 methods, storag

e



                                                                 conditions, and

/or stageof



                                                                 infection. Marta

l RNA



                                                                 mutations, vacc

inations,



                                                                 antiviraltherap

eutics,



                                                                 antibiotics,



                                                                 chemotherapeuti

c



                                                                 orimmunosuppres

sally drugs



                                                                 have not been e

valuated



                                                                 for effectson d

etection.



                                                                 Results are for

 the



                                                                 identification 

of



                                                                 SARS-CoV-2 RNA 

usingthe



                                                                 [place]

stem under



                                                                 the FDA Emergen

cy



                                                                 UseAuthorizatio

n.  The



                                                                 testing is perf

ormed by



                                                                 personneltraine

d in the



                                                                 procedures for 

the [employer]



                                                                  molecular

diagnostic



                                                                 SARS-CoV-2 assa

y in vitro.



- XR CHEST 2 -11-73 14:34:00
************************************************************[facility]        : 1950        Sex: 
M************************************************************ FAX: Momo Dawson MD    [phone]    [place]:   St: PRE---------------------
----------------------------------------------------------  Name:   
DEISI RICHTER [facility]                    : 1950  Age/S: 71/M  
         [address] [place] [address] Blvd     Unit #: [id number]      Loc: [place] [address]                 Phys: Momo Mena MD                        
                           Acct: [id number] Dis Date:               Status: 
PRE [facility]                                PHONE #: [phone]     Exam Date:     
2021     1147                   FAX #: [phone]     Reason: WATCHMAN  
 EXAMS:                                               CPT CODE:      [id number] 
XR CHEST 2 V                       [address]                    Study:  - XR CHEST 2
 V 2021 11:17 AM   Patient Name: DEISI RICHTER MR: [id number]       : 
1950; Age: 71 years  y/o Male       Ordering Physician: Momo Mena MD  
             Clinical Indication: WATCHMAN               Comparison: Michell 10, 
2021 x-ray               FINDINGS               LUNGS: Subtle perihilar 
opacities.  Right basilar linear atelectasis.               HEART AND 
MEDIASTINUM: Mildly enlarged heart. Prosthetic aortic valve.               
LINES: None.               OSSEOUS STRUCTURES: Mild spinal degenerative change 
without fracture,       dislocation, or focal osseous lesion.               
OTHER: None.                    IMPRESSION:                   Subtle perihilar 
opacities, which may represent mild pulmonary edema         or pneumonia.       
  Stable cardiomegaly     SL: [id number]          ** Electronically Signed by 
RADHA Moyer on 2021 at 1435 **                      Reported and 
signed by: Dennis Moyer M.D.       CC: Momo Mena MD         Technologist: 
RT Joe(R)                                  Trnscrd Date/Time/By: 0
2021 (9935) : By: [email or web address]          Orig Print D/T: S: 2021 (9426) 
  PAGE  1                       Signed ReportPROTHROMBIN [id number] 12:31:00





             Test Item    Value        Reference Range Interpretation Comments

 

             PROTHROMBIN TIME 13.0 SECONDS 9.3-12.9     H            



             PATIENT (test code =                                        



             PTP)                                                

 

             INTERNATIONAL NORMAL 1.2          0.8-1.2      N                   

         TARGET



             RATIO (test code =                                        INR BY IN

DICATION



             INR)                                                Indication



                                                                 



                                                                   INR1. Prophyl

axis of



                                                                 venous thrombos

is



                                                                        2.0 - 3.

0



                                                                 (orthopedic cande

amaya),



                                                                 Prophylaxis of



                                                                 venous thrombos

is



                                                                 (other than hig

h-risk



                                                                  surgery), Mary

tment



                                                                 of Deep Vein



                                                                 Thrombosis/Pulm

onary



                                                                 Embolism, Preve

ntion



                                                                 of systemic emb

olism -



                                                                 Tissue heart va

lves,



                                                                 Acute Myocardia

l



                                                                 Infarction (to 

prevent



                                                                   systemic embo

lism),



                                                                 Valvular heart



                                                                 disease,   Atri

al



                                                                 Fibrillation,



                                                                 Bileaflet mecha

nical



                                                                 valve in aortic



                                                                 position.2. Mec

hanical



                                                                 prosthetic valv

es



                                                                 (high risk),   

 2.5 -



                                                                 3.5   Presence 

of



                                                                 Lupus Anticoagu

lant or



                                                                   Antiphospholi

pid



                                                                 Antibodies, Pre

vention



                                                                 of   systemic e

mbolism



                                                                 - Acute Myocard

ial



                                                                 Infarction   (t

o



                                                                 prevent recurre

nt



                                                                 infarct).



BASIC METABOLIC [id number] 12:28:00





             Test Item    Value        Reference Range Interpretation Comments

 

             SODIUM (test code = NA) 144 mEq/L    134-147      N            

 

             POTASSIUM (test code = 4.1 mEq/L    3.4-5.0      N            



             K)                                                  

 

             CHLORIDE (test code = 108 mEq/L    100-108      N            



             CL)                                                 

 

             CARBON DIOXIDE (test 28 mEq/l     21-33        N            



             code = CO2)                                         

 

             ANION GAP (test code = 12           0-20         N            



             GAP)                                                

 

             GLUCOSE (test code = 111 mg/dL           H            



             GLU)                                                

 

             BLOOD UREA NITROGEN 17 mg/dL     7-18         N            



             (test code = BUN)                                        

 

             GLOMERULAR FILTRATION 54.3         70-80        L            Units 

of measure =



             RATE (test code = GFR)                                        ml/mi

n/1.73 m2

 

             CREATININE (test code = 1.3 mg/dL    0.6-1.3      N            



             CREAT)                                              

 

             CALCIUM (test code = 8.9 mg/dL    8.0-10.5     N            



             CA)                                                 



[id number] 12:28:00





             Test Item    Value        Reference Range Interpretation Comments

 

             PREALBUMIN (test code = PREALB)  mg/dL       16.0-40.0             

    



BASIC METABOLIC [id number] 12:28:00





             Test Item    Value        Reference Range Interpretation Comments

 

             SODIUM (test code = NA) 144 mEq/L    134-147      N            

 

             POTASSIUM (test code = 4.1 mEq/L    3.4-5.0      N            



             K)                                                  

 

             CHLORIDE (test code = 108 mEq/L    100-108      N            



             CL)                                                 

 

             CARBON DIOXIDE (test 28 mEq/l     21-33        N            



             code = CO2)                                         

 

             ANION GAP (test code = 12           0-20         N            



             GAP)                                                

 

             GLUCOSE (test code = 111 mg/dL           H            



             GLU)                                                

 

             BLOOD UREA NITROGEN 17 mg/dL     7-18         N            



             (test code = BUN)                                        

 

             GLOMERULAR FILTRATION 54.3         70-80        L            Units 

of measure =



             RATE (test code = GFR)                                        ml/mi

n/1.73 m2

 

             CREATININE (test code = 1.3 mg/dL    0.6-1.3      N            



             CREAT)                                              

 

             CALCIUM (test code = 8.9 mg/dL    8.0-10.5     N            



             CA)                                                 



[id number] 12:28:00





             Test Item    Value        Reference Range Interpretation Comments

 

             PREALBUMIN (test code = PREALB) 27.3 mg/dL   16.0-40.0    N        

    



PROTHROMBIN [id number] 12:26:00





             Test Item    Value        Reference Range Interpretation Comments

 

             PROTHROMBIN TIME  SECONDS     9.3-12.9                  



             PATIENT (test code =                                        



             PTP)                                                

 

             INTERNATIONAL NORMAL 1.2          0.8-1.2      N                   

         TARGET



             RATIO (test code =                                        INR BY IN

DICATION



             INR)                                                Indication



                                                                 



                                                                 INR1. Prophylax

is of



                                                                 venous thrombos

is



                                                                      2.0 - 3.0



                                                                 (orthopedic cande

amaya),



                                                                 Prophylaxis of 

  venous



                                                                 thrombosis (oth

er than



                                                                 high-risk   cande

amaya),



                                                                 Treatment of De

ep Vein



                                                                 Thrombosis/Pulm

onary



                                                                 Embolism, Preve

ntion



                                                                 of systemic emb

olism -



                                                                 Tissue heart va

lves,



                                                                 Acute Myocardia

l



                                                                 Infarction (to 

prevent



                                                                 systemic emboli

sm),



                                                                 Valvular heart 

disease,



                                                                  Atrial Fibrill

ation,



                                                                 Bileaflet mecha

nical



                                                                 valve in aortic



                                                                 position.2. Mec

hanical



                                                                 prosthetic valv

es (high



                                                                 risk),    2.5 -

 3.5



                                                                 Presence of Lup

us



                                                                 Anticoagulant o

r



                                                                 Antiphospholipi

d



                                                                 Antibodies, Pre

vention



                                                                 of   systemic e

mbolism -



                                                                 Acute Myocardia

l



                                                                 Infarction   (t

o prevent



                                                                 recurrent infar

ct).



CBC W/AUTO [id number] 12:12:00





             Test Item    Value        Reference Range Interpretation Comments

 

             WHITE BLOOD CELL (test code = 11.5 x10 3/uL 4.5-11.0     H         

   



             WBC)                                                

 

             RED BLOOD CELL (test code = 3.87 x10 6/uL 4.00-5.60    L           

 



             RBC)                                                

 

             HEMOGLOBIN (test code = HGB) 11.3 g/dL    12.5-16.9    L           

 

 

             HEMATOCRIT (test code = HCT) 36.6 %       37.5-50.7    L           

 

 

             MEAN CELL VOLUME (test code = 94.6 fL      81.0-99.0    N          

  



             MCV)                                                

 

             MEAN CELL HGB (test code = MCH) 29.2 pg      27.0-33.0    N        

    

 

             MEAN CELL HGB CONCETRATION 30.9 g/dL    33.0-37.0    L            



             (test code = MCHC)                                        

 

             RED CELL DISTRIBUTION WIDTH CV 15.2 %       11.5-14.5    H         

   



             (test code = RDW)                                        

 

             RED CELL DISTRIBUTION WIDTH SD 51.9 fL      37.0-54.0    N         

   



             (test code = RDW-SD)                                        

 

             PLATELET COUNT (test code = 127 x10 3/uL 150-400      L            



             PLT)                                                

 

             MEAN PLATELET VOLUME (test code 11.8 fL      7.0-9.0      H        

    



             = MPV)                                              

 

             NEUTROPHIL % (test code = NT%) 75.4 %       56.0-77.0    N         

   

 

             IMMATURE GRANULOCYTE % (test 1.5 %        0.0-2.0      N           

 



             code = IG%)                                         

 

             LYMPHOCYTE % (test code = LY%) 10.6 %       14.0-32.0    L         

   

 

             MONOCYTE % (test code = MO%) 7.2 %        4.8-9.0      N           

 

 

             EOSINOPHIL % (test code = EO%) 4.4 %        0.3-3.7      H         

   

 

             BASOPHIL % (test code = BA%) 0.9 %        0.0-2.0      N           

 

 

             NUCLEATED RBC % (test code = 0.0 %        0-0          N           

 



             NRBC%)                                              

 

             NEUTROPHIL # (test code = NT#) 8.64 x10 3/uL 2.0-7.6      H        

    

 

             IMMATURE GRANULOCYTE # (test 0.17 x10 3/uL 0.00-0.03    H          

  



             code = IG#)                                         

 

             LYMPHOCYTE # (test code = LY#) 1.22 x10 3/uL 1.0-3.8      N        

    

 

             MONOCYTE # (test code = MO#) 0.83 x10 3/uL 0.1-0.8      H          

  

 

             EOSINOPHIL # (test code = EO#) 0.51 x10 3/uL 0.0-0.2      H        

    

 

             BASOPHIL # (test code = BA#) 0.10 x10 3/uL 0.0-0.2      N          

  

 

             NUCLEATED RBC # (test code = 0.00 x10 3/uL 0.0-0.1      N          

  



             NRBC#)                                              

 

             MANUAL DIFF REQUIRED (test code NO                                 

    



             = MDIFF)                                            



- XR CHEST 1 -06-10 08:05:00
[facility]        : 1950        Sex: 
M************************************************************ FAX: Reji Leger [phone]    [place]:   St: ADM FAX:         Maria Del Carmen Vasquez [phone]    FAX: Aida Hairston [phone]   
------------------------------------------------------------------------------- 
 Name:   DEISI RICHTER                  [facility]                    
: 1950  Age/S: 71/M           [address] [place] [address]    Unit #: 
[id number]      Loc: [address] [place] [address]                 Phys: 
Maria Del Carmen Dove NP                                               Acct: 
[id number] Dis Date:               Status: ADM IN                             
    PHONE #: [phone]     Exam Date:     06/10/2021     0550                
   FAX #: [phone]     Reason: SOB  EXAMS:                                  
             CPT CODE:      [id number] XR CHEST 1 V                      [id number]   
                 Clinical Indication: Shortness of breath.       Comparison: 
2021.                 Impression: Chest, single view submitted on 2 images. 
 Cardiomegaly       and interstitial edema, improved from prior.  Trace pleural 
fluid         bilaterally.  No pneumothorax.  Osseous structures are unchanged. 
                            SL:  [id number]          ** Electronically Signed 
by RADHA Hoang **            **            on 06/10/2021 at 0805         
   **                      Reported and signed by: Gerber Hoang M.D.          
 CC: Reji Muhammad MD; Maria Del Carmen Dove NP;                        
Aida Maynard MD                                           Technologist: 
Duane Stucker, RT(R); Lucy FelixRT(R)             Trnscrd Date/Time/By: 
06/10/2021 (805) : By: [id number]          Orig Print D/T: S: 06/10/2021 (809)
                         PAGE  1                       Signed ReportBASIC 
METABOLIC PANEL2021-06-10 05:55:00





             Test Item    Value        Reference Range Interpretation Comments

 

             SODIUM (test code = NA) 142 mEq/L    134-147      N            

 

             POTASSIUM (test code = 3.5 mEq/L    3.4-5.0      N            



             K)                                                  

 

             CHLORIDE (test code = 105 mEq/L    100-108                   



             CL)                                                 

 

             CARBON DIOXIDE (test 30 mEq/l     21-33                     



             code = CO2)                                         

 

             ANION GAP (test code = 11           0-20         N            



             GAP)                                                

 

             GLUCOSE (test code = 113 mg/dL           H            



             GLU)                                                

 

             BLOOD UREA NITROGEN 22 mg/dL     7-18         H            



             (test code = BUN)                                        

 

             GLOMERULAR FILTRATION 59.5         70-80        L            Units 

of measure =



             RATE (test code = GFR)                                        ml/mi

n/1.73 m2

 

             CREATININE (test code = 1.2 mg/dL    0.6-1.3      N            



             CREAT)                                              

 

             CALCIUM (test code = 8.9 mg/dL    8.0-10.5     N            



             CA)                                                 



MAGNESIUM2021-06-10 05:55:00





             Test Item    Value        Reference Range Interpretation Comments

 

             MAGNESIUM (test code = MAG) 1.86 mg/dL   1.80-2.40    N            



CBC W/AUTO DIFF2021-06-10 05:37:00





             Test Item    Value        Reference Range Interpretation Comments

 

             WHITE BLOOD CELL (test code = 12.3 x10 3/uL 4.5-11.0     H         

   



             WBC)                                                

 

             RED BLOOD CELL (test code = 3.48 x10 6/uL 4.00-5.60    L           

 



             RBC)                                                

 

             HEMOGLOBIN (test code = HGB) 10.1 g/dL    12.5-16.9    L           

 

 

             HEMATOCRIT (test code = HCT) 32.6 %       37.5-50.7    L           

 

 

             MEAN CELL VOLUME (test code = 93.7 fL      81.0-99.0    N          

  



             MCV)                                                

 

             MEAN CELL HGB (test code = MCH) 29.0 pg      27.0-33.0    N        

    

 

             MEAN CELL HGB CONCETRATION 31.0 g/dL    33.0-37.0    L            



             (test code = MCHC)                                        

 

             RED CELL DISTRIBUTION WIDTH CV 14.4 %       11.5-14.5    N         

   



             (test code = RDW)                                        

 

             RED CELL DISTRIBUTION WIDTH SD 48.3 fL      37.0-54.0    N         

   



             (test code = RDW-SD)                                        

 

             PLATELET COUNT (test code = 113 x10 3/uL 150-400      L            



             PLT)                                                

 

             MEAN PLATELET VOLUME (test code 11.6 fL      7.0-9.0      H        

    



             = MPV)                                              

 

             NEUTROPHIL % (test code = NT%) 72.8 %       56.0-77.0    N         

   

 

             IMMATURE GRANULOCYTE % (test 1.0 %        0.0-2.0      N           

 



             code = IG%)                                         

 

             LYMPHOCYTE % (test code = LY%) 14.3 %       14.0-32.0    N         

   

 

             MONOCYTE % (test code = MO%) 8.1 %        4.8-9.0      N           

 

 

             EOSINOPHIL % (test code = EO%) 3.1 %        0.3-3.7      N         

   

 

             BASOPHIL % (test code = BA%) 0.7 %        0.0-2.0      N           

 

 

             NUCLEATED RBC % (test code = 0.0 %        0-0          N           

 



             NRBC%)                                              

 

             NEUTROPHIL # (test code = NT#) 8.99 x10 3/uL 2.0-7.6      H        

    

 

             IMMATURE GRANULOCYTE # (test 0.12 x10 3/uL 0.00-0.03    H          

  



             code = IG#)                                         

 

             LYMPHOCYTE # (test code = LY#) 1.76 x10 3/uL 1.0-3.8      N        

    

 

             MONOCYTE # (test code = MO#) 1.00 x10 3/uL 0.1-0.8      H          

  

 

             EOSINOPHIL # (test code = EO#) 0.38 x10 3/uL 0.0-0.2      H        

    

 

             BASOPHIL # (test code = BA#) 0.09 x10 3/uL 0.0-0.2      N          

  

 

             NUCLEATED RBC # (test code = 0.00 x10 3/uL 0.0-0.1      N          

  



             NRBC#)                                              

 

             MANUAL DIFF REQUIRED (test code NO                                 

    



             = MDIFF)                                            



COMPREHENSIVE METABOLIC [id number] 04:55:00





             Test Item    Value        Reference Range Interpretation Comments

 

             SODIUM (test code = NA) 143 mEq/L    134-147      N            

 

             POTASSIUM (test code = 3.9 mEq/L    3.4-5.0      N            



             K)                                                  

 

             CHLORIDE (test code = 112 mEq/L    100-108      H            



             CL)                                                 

 

             CARBON DIOXIDE (test 23 mEq/l     21-33        N            



             code = CO2)                                         

 

             ANION GAP (test code = 12           0-20         N            



             GAP)                                                

 

             GLUCOSE (test code = 125 mg/dL           H            



             GLU)                                                

 

             BLOOD UREA NITROGEN 19 mg/dL     7-18         H            



             (test code = BUN)                                        

 

             GLOMERULAR FILTRATION 73.5         70-80        N            Units 

of measure =



             RATE (test code = GFR)                                        ml/mi

n/1.73 m2

 

             CREATININE (test code = 1.0 mg/dL    0.6-1.3      N            



             CREAT)                                              

 

             TOTAL PROTEIN (test 6.2 g/dL     6.4-8.2      L            



             code = PROT)                                        

 

             ALBUMIN (test code = 3.70 g/dL    3.4-5.0      N            



             ALB)                                                

 

             CALCIUM (test code = 8.3 mg/dL    8.0-10.5     N            



             CA)                                                 

 

             BILIRUBIN TOTAL (test 0.40 mg/dL   0.0-1.0      N            



             code = BILT)                                        

 

             SGOT/AST (test code = 20 IUnit/L   15-37        N            



             AST)                                                

 

             SGPT/ALT (test code = 19 IUnit/L   30-65        L            



             ALT)                                                

 

             ALKALINE PHOSPHATASE 38 IUnit/L          N            



             TOTAL (test code =                                        



             ALKP)                                               



CBC W/AUTO [id number] 04:42:00





             Test Item    Value        Reference Range Interpretation Comments

 

             WHITE BLOOD CELL (test code = 15.0 x10 3/uL 4.5-11.0     H         

   



             WBC)                                                

 

             RED BLOOD CELL (test code = 3.28 x10 6/uL 4.00-5.60    L           

 



             RBC)                                                

 

             HEMOGLOBIN (test code = HGB) 9.6 g/dL     12.5-16.9    L           

 

 

             HEMATOCRIT (test code = HCT) 30.9 %       37.5-50.7    L           

 

 

             MEAN CELL VOLUME (test code = 94.2 fL      81.0-99.0    N          

  



             MCV)                                                

 

             MEAN CELL HGB (test code = 29.3 pg      27.0-33.0    N            



             MCH)                                                

 

             MEAN CELL HGB CONCETRATION 31.1 g/dL    33.0-37.0    L            



             (test code = MCHC)                                        

 

             RED CELL DISTRIBUTION WIDTH CV 14.2 %       11.5-14.5    N         

   



             (test code = RDW)                                        

 

             RED CELL DISTRIBUTION WIDTH SD 49.1 fL      37.0-54.0    N         

   



             (test code = RDW-SD)                                        

 

             PLATELET COUNT (test code = 114 x10 3/uL 150-400      L            



             PLT)                                                

 

             MEAN PLATELET VOLUME (test 11.6 fL      7.0-9.0      H            



             code = MPV)                                         

 

             NEUTROPHIL % (test code = NT%) 85.2 %       56.0-77.0    H         

   

 

             IMMATURE GRANULOCYTE % (test 1.0 %        0.0-2.0      N           

 



             code = IG%)                                         

 

             LYMPHOCYTE % (test code = LY%) 6.4 %        14.0-32.0    L         

   

 

             MONOCYTE % (test code = MO%) 7.1 %        4.8-9.0      N           

 

 

             EOSINOPHIL % (test code = EO%) 0.1 %        0.3-3.7      L         

   

 

             BASOPHIL % (test code = BA%) 0.2 %        0.0-2.0      N           

 

 

             NUCLEATED RBC % (test code = 0.0 %        0-0          N           

 



             NRBC%)                                              

 

             NEUTROPHIL # (test code = NT#) 12.74 x10 3/uL 2.0-7.6      H       

     

 

             IMMATURE GRANULOCYTE # (test 0.15 x10 3/uL 0.00-0.03    H          

  



             code = IG#)                                         

 

             LYMPHOCYTE # (test code = LY#) 0.96 x10 3/uL 1.0-3.8      L        

    

 

             MONOCYTE # (test code = MO#) 1.06 x10 3/uL 0.1-0.8      H          

  

 

             EOSINOPHIL # (test code = EO#) 0.02 x10 3/uL 0.0-0.2      N        

    

 

             BASOPHIL # (test code = BA#) 0.03 x10 3/uL 0.0-0.2      N          

  

 

             NUCLEATED RBC # (test code = 0.00 x10 3/uL 0.0-0.1      N          

  



             NRBC#)                                              

 

             MANUAL DIFF REQUIRED (test NO                                     



             code = MDIFF)                                        



- XR CHEST 1 -89-73 12:22:00
********************************[facility]        : 1950        Sex: 
M************************************************************ FAX: Reji Leger [phone]    [place]:   St: [facility] FAX: Aida Fernandez [phone]    FAX:         Hermelinda Galaviz [phone]   
------------------------------------------------------------------------------- 
 Name:   DEISI RICHTER                  [facility]                    
: 1950  Age/S: 71/M           [address] [place] [address]    Unit #: 
[id number]      Loc: [place] [address]                 Phys: 
Hermelinda Galaviz NP                                                Acct: 
[id number] Dis Date:               Status: ADM IN                             
    PHONE #: [phone]     Exam Date:     2021     1216                
   FAX #: [phone]     Reason: pulm edema?  EXAMS:                          
                     CPT CODE:      [id number] XR CHEST 1 V                      
[id number]                    EXAM: XR CHEST 1 VIEW               DATE: 2021 11:
37 AM : 1950; Age: 71 years  y/o Male               INDICATION: pulm 
edema?               COMPARISON: 2021               TECHNIQUE: AP chest.
                  IMPRESSION:    Lines, tubes and hardware: Prosthetic cardiac 
valve.                   Heart, mediastinum and lungs: Cardiomegaly is again 
seen.         Atherosclerotic changes are seen involving the aorta.  Mild hazy  
       opacities within bilateral lungs, which may represent edema.  Probable   
      trace right pleural effusion.  Right paratracheal fullness is again       
  seen.                   SL: [id number]                              ** 
Electronically Signed by NESTOR Anderson **            **           on 
2021 at 1222            **                      Reported and signed by: 
NESTOR Bailey               CC: Reji Muhammad MD; Aida Maynard MD;                     Hermelinda Galaviz NP                            
                   Technologist: Mica Dueñas RT(R)(M)                       
        Trnscrd Date/Time/By: 2021 (2312) : By: [id number]          Orig 
Print D/T: S: 2021 (6615)                         PAGE  1                 
      Signed ReportBASIC METABOLIC [id number] 10:58:00





             Test Item    Value        Reference Range Interpretation Comments

 

             SODIUM (test code = NA) 144 mEq/L    134-147      N            

 

             POTASSIUM (test code = 4.3 mEq/L    3.4-5.0      N            



             K)                                                  

 

             CHLORIDE (test code = 114 mEq/L    100-108      H            



             CL)                                                 

 

             CARBON DIOXIDE (test 23 mEq/l     21-33        N            



             code = CO2)                                         

 

             ANION GAP (test code = 12           0-20         N            



             GAP)                                                

 

             GLUCOSE (test code = 172 mg/dL           H            



             GLU)                                                

 

             BLOOD UREA NITROGEN 17 mg/dL     7-18         N            



             (test code = BUN)                                        

 

             GLOMERULAR FILTRATION 73.5         70-80        N            Units 

of measure =



             RATE (test code = GFR)                                        ml/mi

n/1.73 m2

 

             CREATININE (test code = 1.0 mg/dL    0.6-1.3      N            



             CREAT)                                              

 

             CALCIUM (test code = 7.6 mg/dL    8.0-10.5     L            



             CA)                                                 



CBC W/AUTO [id number] 10:42:00





             Test Item    Value        Reference Range Interpretation Comments

 

             WHITE BLOOD CELL (test code = 15.9 x10 3/uL 4.5-11.0     H         

   



             WBC)                                                

 

             RED BLOOD CELL (test code = 3.49 x10 6/uL 4.00-5.60    L           

 



             RBC)                                                

 

             HEMOGLOBIN (test code = HGB) 10.3 g/dL    12.5-16.9    L           

 

 

             HEMATOCRIT (test code = HCT) 33.4 %       37.5-50.7    L           

 

 

             MEAN CELL VOLUME (test code = 95.7 fL      81.0-99.0    N          

  



             MCV)                                                

 

             MEAN CELL HGB (test code = 29.5 pg      27.0-33.0    N            



             MCH)                                                

 

             MEAN CELL HGB CONCETRATION 30.8 g/dL    33.0-37.0    L            



             (test code = MCHC)                                        

 

             RED CELL DISTRIBUTION WIDTH CV 14.4 %       11.5-14.5    N         

   



             (test code = RDW)                                        

 

             RED CELL DISTRIBUTION WIDTH SD 50.2 fL      37.0-54.0    N         

   



             (test code = RDW-SD)                                        

 

             PLATELET COUNT (test code = 131 x10 3/uL 150-400      L            



             PLT)                                                

 

             MEAN PLATELET VOLUME (test 11.2 fL      7.0-9.0      H            



             code = MPV)                                         

 

             NEUTROPHIL % (test code = NT%) 89.0 %       56.0-77.0    H         

   

 

             IMMATURE GRANULOCYTE % (test 1.4 %        0.0-2.0      N           

 



             code = IG%)                                         

 

             LYMPHOCYTE % (test code = LY%) 4.1 %        14.0-32.0    L         

   

 

             MONOCYTE % (test code = MO%) 3.3 %        4.8-9.0      L           

 

 

             EOSINOPHIL % (test code = EO%) 1.6 %        0.3-3.7      N         

   

 

             BASOPHIL % (test code = BA%) 0.6 %        0.0-2.0      N           

 

 

             NUCLEATED RBC % (test code = 0.0 %        0-0          N           

 



             NRBC%)                                              

 

             NEUTROPHIL # (test code = NT#) 14.19 x10 3/uL 2.0-7.6      H       

     

 

             IMMATURE GRANULOCYTE # (test 0.22 x10 3/uL 0.00-0.03    H          

  



             code = IG#)                                         

 

             LYMPHOCYTE # (test code = LY#) 0.66 x10 3/uL 1.0-3.8      L        

    

 

             MONOCYTE # (test code = MO#) 0.53 x10 3/uL 0.1-0.8      N          

  

 

             EOSINOPHIL # (test code = EO#) 0.25 x10 3/uL 0.0-0.2      H        

    

 

             BASOPHIL # (test code = BA#) 0.09 x10 3/uL 0.0-0.2      N          

  

 

             NUCLEATED RBC # (test code = 0.00 x10 3/uL 0.0-0.1      N          

  



             NRBC#)                                              

 

             MANUAL DIFF REQUIRED (test NO                                     



             code = MDIFF)                                        



[id number] 09:35:00





             Test Item    Value        Reference Range Interpretation Comments

 

             ACT-ISTAT (test code 263 SEC             H            Perform

ed by certified



             = ACTI)                                              at  [facility]



POC VENOUS BLOOD [id number] 09:17:00





             Test Item    Value        Reference Range Interpretation Comments

 

             POC VENOUS BLOOD GAS PH (test 7.290        7.33-7.45    L          

  



             code = POCPHV)                                        

 

             POC VENOUS BLOOD GAS PCO2 (test 41.5 mmHg    43-47        L        

    



             code = [id number])                                        

 

             POC VENOUS BLOOD GAS PO2 (test 36.7 mmHG    10-50        N         

   



             code = HTYAA2X)                                        

 

             POC TCO2 VENOUS (test code = 21.3                                  

 



             [id number])                                           

 

             POC HCO3 VENOUS (test code = 20.0 MMOL/L  22-27        L           

 



             [id number])                                           

 

             POC BASE EXCESS VENOUS (test code -6.1 MMOL/L  -4.0-4.0     L      

      



             = POCBEV)                                           

 

             POC O2 SATURATION VENOUS (test 63.6 %       60-80        N         

   



             code = [id number])                                        



BASIC METABOLIC [id number] 09:17:00





             Test Item    Value        Reference Range Interpretation Comments

 

             SODIUM (test code = NA/ABG)  MEQ/L       134-147                   

 

             POTASSIUM (test code = K/ABG)  MEQ/L       3.4-5.0                 

  

 

             CHLORIDE (test code = CL/ABG)  MEQ/L       100-108                 

  

 

             CREATININE ABG (test code = CREAABG)  mg/dL       0.8-1.3          

         

 

             POC IONIZED CALCIUM (test code =  MMOL/L      1.12-1.32            

     



             POCCA)                                              

 

             POC GLUCOSE (test code = POCGLU)  MG/DL                      

     



[id number] 09:17:00





             Test Item    Value        Reference Range Interpretation Comments

 

             HEMOGLOBIN (test code = HGB/ABG)  G/DL        12.5-16.9            

     



[id number] 09:17:00





             Test Item    Value        Reference Range Interpretation Comments

 

             HEMATOCRIT (test code = HCT/ABG)  %           37.5-50.7            

     



POC LACTIC [id number] 09:17:00





             Test Item    Value        Reference Range Interpretation Comments

 

             POC LACTIC ACID (test code = 0.9 mmol/l   0.9-1.7      N           

 



             POCLAC)                                             



POC VENOUS BLOOD [id number] 09:17:00





             Test Item    Value        Reference Range Interpretation Comments

 

             POC VENOUS BLOOD GAS PH (test 7.290        7.33-7.45    L          

  



             code = POCPHV)                                        

 

             POC VENOUS BLOOD GAS PCO2 (test 41.5 mmHg    43-47        L        

    



             code = [id number])                                        

 

             POC VENOUS BLOOD GAS PO2 (test 36.7 mmHG    10-50        N         

   



             code = [id number])                                        

 

             POC TCO2 VENOUS (test code = 21.3                                  

 



             [id number])                                           

 

             POC HCO3 VENOUS (test code = 20.0 MMOL/L  22-27        L           

 



             SPQVNA3C)                                           

 

             POC BASE EXCESS VENOUS (test code -6.1 MMOL/L  -4.0-4.0     L      

      



             = POCBEV)                                           

 

             POC O2 SATURATION VENOUS (test 63.6 %       60-80        N         

   



             code = IUZB3SF)                                        



BASIC METABOLIC [id number] 09:17:00





             Test Item    Value        Reference Range Interpretation Comments

 

             SODIUM (test code = NA/ABG) 151 MEQ/L    134-147      H            

 

             POTASSIUM (test code = K/ABG) 3.2 MEQ/L    3.4-5.0      L          

  

 

             CHLORIDE (test code = CL/ABG) 119 MEQ/L    100-108      H          

  

 

             CREATININE ABG (test code = 0.9 mg/dL    0.8-1.3      N            



             CREAABG)                                            

 

             POC IONIZED CALCIUM (test code = 0.72 MMOL/L  1.12-1.32    L       

     



             POCCA)                                              

 

             POC GLUCOSE (test code = POCGLU) 118 MG/DL           H       

     



[id number] 09:17:00





             Test Item    Value        Reference Range Interpretation Comments

 

             HEMOGLOBIN (test code = HGB/ABG)  G/DL        12.5-16.9            

     



[id number] 09:17:00





             Test Item    Value        Reference Range Interpretation Comments

 

             HEMATOCRIT (test code = HCT/ABG)  %           37.5-50.7            

     



POC LACTIC [id number] 09:17:00





             Test Item    Value        Reference Range Interpretation Comments

 

             POC LACTIC ACID (test code = 0.9 mmol/l   0.9-1.7      N           

 



             POCLAC)                                             



POC VENOUS BLOOD [id number] 09:17:00





             Test Item    Value        Reference Range Interpretation Comments

 

             POC VENOUS BLOOD GAS PH (test 7.290        7.33-7.45    L          

  



             code = POCPHV)                                        

 

             POC VENOUS BLOOD GAS PCO2 (test 41.5 mmHg    43-47        L        

    



             code = HLQMYU3G)                                        

 

             POC VENOUS BLOOD GAS PO2 (test 36.7 mmHG    10-50        N         

   



             code = [id number])                                        

 

             POC TCO2 VENOUS (test code = 21.3                                  

 



             OZTCLV7S)                                           

 

             POC HCO3 VENOUS (test code = 20.0 MMOL/L  22-27        L           

 



             ADGCRU4H)                                           

 

             POC BASE EXCESS VENOUS (test code -6.1 MMOL/L  -4.0-4.0     L      

      



             = POCBEV)                                           

 

             POC O2 SATURATION VENOUS (test 63.6 %       60-80        N         

   



             code = ASLF5RI)                                        



BASIC METABOLIC [id number] 09:17:00





             Test Item    Value        Reference Range Interpretation Comments

 

             SODIUM (test code = NA/ABG) 151 MEQ/L    134-147      H            

 

             POTASSIUM (test code = K/ABG) 3.2 MEQ/L    3.4-5.0      L          

  

 

             CHLORIDE (test code = CL/ABG) 119 MEQ/L    100-108      H          

  

 

             CREATININE ABG (test code = 0.9 mg/dL    0.8-1.3      N            



             CREAABG)                                            

 

             POC IONIZED CALCIUM (test code = 0.72 MMOL/L  1.12-1.32    L       

     



             POCCA)                                              

 

             POC GLUCOSE (test code = POCGLU) 118 MG/DL           H       

     



[id number] 09:17:00





             Test Item    Value        Reference Range Interpretation Comments

 

             HEMOGLOBIN (test code = HGB/ABG) 8.0 G/DL     12.5-16.9    L       

     



[id number] 09:17:00





             Test Item    Value        Reference Range Interpretation Comments

 

             HEMATOCRIT (test code = HCT/ABG)  %           37.5-50.7            

     



POC LACTIC [id number] 09:17:00





             Test Item    Value        Reference Range Interpretation Comments

 

             POC LACTIC ACID (test code = 0.9 mmol/l   0.9-1.7      N           

 



             POCLAC)                                             



POC VENOUS BLOOD [id number] 09:17:00





             Test Item    Value        Reference Range Interpretation Comments

 

             POC VENOUS BLOOD GAS PH (test 7.290        7.33-7.45    L          

  



             code = POCPHV)                                        

 

             POC VENOUS BLOOD GAS PCO2 (test 41.5 mmHg    43-47        L        

    



             code = LVRSLR3E)                                        

 

             POC VENOUS BLOOD GAS PO2 (test 36.7 mmHG    10-50        N         

   



             code = TSXJB5R)                                        

 

             POC TCO2 VENOUS (test code = 21.3                                  

 



             TEUXJL6S)                                           

 

             POC HCO3 VENOUS (test code = 20.0 MMOL/L  22-27        L           

 



             HXCHJJ3Z)                                           

 

             POC BASE EXCESS VENOUS (test code -6.1 MMOL/L  -4.0-4.0     L      

      



             = POCBEV)                                           

 

             POC O2 SATURATION VENOUS (test 63.6 %       60-80        N         

   



             code = GSEU0DZ)                                        



BASIC METABOLIC [id number] 09:17:00





             Test Item    Value        Reference Range Interpretation Comments

 

             SODIUM (test code = NA/ABG) 151 MEQ/L    134-147      H            

 

             POTASSIUM (test code = K/ABG) 3.2 MEQ/L    3.4-5.0      L          

  

 

             CHLORIDE (test code = CL/ABG) 119 MEQ/L    100-108      H          

  

 

             CREATININE ABG (test code = 0.9 mg/dL    0.8-1.3      N            



             CREAABG)                                            

 

             POC IONIZED CALCIUM (test code = 0.72 MMOL/L  1.12-1.32    L       

     



             POCCA)                                              

 

             POC GLUCOSE (test code = POCGLU) 118 MG/DL           H       

     



[id number] 09:17:00





             Test Item    Value        Reference Range Interpretation Comments

 

             HEMOGLOBIN (test code = HGB/ABG) 8.0 G/DL     12.5-16.9    L       

     



[id number] 09:17:00





             Test Item    Value        Reference Range Interpretation Comments

 

             HEMATOCRIT (test code = HCT/ABG) 23 %         37.5-50.7    L       

     



POC LACTIC [id number] 09:17:00





             Test Item    Value        Reference Range Interpretation Comments

 

             POC LACTIC ACID (test code = 0.9 mmol/l   0.9-1.7      N           

 



             POCLAC)                                             



POC VENOUS BLOOD [id number] 09:17:00





             Test Item    Value        Reference Range Interpretation Comments

 

             POC VENOUS BLOOD GAS PH (test 7.290        7.33-7.45    L          

  



             code = POCPHV)                                        

 

             POC VENOUS BLOOD GAS PCO2 (test 41.5 mmHg    43-47        L        

    



             code = FERMUD9V)                                        

 

             POC VENOUS BLOOD GAS PO2 (test 36.7 mmHG    10-50        N         

   



             code = BDWZP6E)                                        

 

             POC TCO2 VENOUS (test code = 21.3                                  

 



             JPDAXF1V)                                           

 

             POC HCO3 VENOUS (test code = 20.0 MMOL/L  22-27        L           

 



             NZEKMM5Z)                                           

 

             POC BASE EXCESS VENOUS (test code -6.1 MMOL/L  -4.0-4.0     L      

      



             = POCBEV)                                           

 

             POC O2 SATURATION VENOUS (test 63.6 %       60-80        N         

   



             code = WOUG8JV)                                        



BASIC METABOLIC [id number] 09:17:00





             Test Item    Value        Reference Range Interpretation Comments

 

             SODIUM (test code = NA/ABG)  MEQ/L       134-147                   

 

             POTASSIUM (test code = K/ABG)  MEQ/L       3.4-5.0                 

  

 

             CHLORIDE (test code = CL/ABG)  MEQ/L       100-108                 

  

 

             CREATININE ABG (test code = CREAABG)  mg/dL       0.8-1.3          

         

 

             POC IONIZED CALCIUM (test code =  MMOL/L      1.12-1.32            

     



             POCCA)                                              

 

             POC GLUCOSE (test code = POCGLU)  MG/DL                      

     



[id number] 09:17:00





             Test Item    Value        Reference Range Interpretation Comments

 

             HEMOGLOBIN (test code = HGB/ABG)  G/DL        12.5-16.9            

     



[id number] 09:17:00





             Test Item    Value        Reference Range Interpretation Comments

 

             HEMATOCRIT (test code = HCT/ABG)  %           37.5-50.7            

     



POC LACTIC [id number] 09:17:00





             Test Item    Value        Reference Range Interpretation Comments

 

             POC LACTIC ACID (test code = POCLAC)  mmol/l      0.9-1.7          

         



COVID 19 Asymptomatic IH [id number] 13:23:00





             Test Item    Value        Reference Range Interpretation Comments

 

             COVID 19 Asymptomatic Negative     Negative                  A nega

tive result is



             IH AG (test code =                                        presumpti

ve and should



             COVNONPUIAG)                                        be confirmedwit

h an FDA



                                                                 authorized mole

cular



                                                                 assay, if neces

nancy



                                                                 forpatient autumn

gement.A



                                                                 positive result

 does not



                                                                 rule out co-inf

ections



                                                                 withother patho

gens.This



                                                                 test detects pj

th viable



                                                                 (live) and



                                                                 non-viable,SARS

-CoV, and



                                                                 SARS-CoV-2. Alexis

t



                                                                 performance dep

ends on



                                                                 theamount of vi

jennifer



                                                                 (antigen) in th

e



                                                                 sample.This alexis

t has not



                                                                 been FDA cleare

d or



                                                                 approved; the t

est



                                                                 hasbeen authori

zed by



                                                                 FDA under an Em

ergency



                                                                 Use Authorizati

on(EUA)



                                                                 for use by labo

ratories



                                                                 certified under

 the CLIA



                                                                 thatmeet the



                                                                 requirements to

 perform



                                                                 moderate, high 

or



                                                                 waivedcomplexit

y tests.



B-TYPE NATRIURETIC [id number] 11:27:00





             Test Item    Value        Reference Range Interpretation Comments

 

             B-TYPE NATRIURETIC PEPTIDE (test 700.0 PG/ML  0-100        H       

     



             code = BNP)                                         



PROTHROMBIN [id number] 11:26:00





             Test Item    Value        Reference Range Interpretation Comments

 

             PROTHROMBIN TIME 12.7 SECONDS 9.3-12.9     N            



             PATIENT (test code =                                        



             PTP)                                                

 

             INTERNATIONAL NORMAL 1.2          0.8-1.2      N                   

         TARGET



             RATIO (test code =                                        INR BY IN

DICATION



             INR)                                                Indication



                                                                 



                                                                   INR1. Prophyl

axis of



                                                                 venous thrombos

is



                                                                        2.0 - 3.

0



                                                                 (orthopedic cande

amaya),



                                                                 Prophylaxis of



                                                                 venous thrombos

is



                                                                 (other than hig

h-risk



                                                                  surgery), Mary

tment



                                                                 of Deep Vein



                                                                 Thrombosis/Pulm

onary



                                                                 Embolism, Preve

ntion



                                                                 of systemic emb

olism -



                                                                 Tissue heart va

lves,



                                                                 Acute Myocardia

l



                                                                 Infarction (to 

prevent



                                                                   systemic embo

lism),



                                                                 Valvular heart



                                                                 disease,   Atri

al



                                                                 Fibrillation,



                                                                 Bileaflet mecha

nical



                                                                 valve in aortic



                                                                 position.2. Mec

hanical



                                                                 prosthetic valv

es



                                                                 (high risk),   

 2.5 -



                                                                 3.5   Presence 

of



                                                                 Lupus Anticoagu

lant or



                                                                   Antiphospholi

pid



                                                                 Antibodies, Pre

vention



                                                                 of   systemic e

mbolism



                                                                 - Acute Myocard

ial



                                                                 Infarction   (t

o



                                                                 prevent recurre

nt



                                                                 infarct).



THROMBOPLASTIN TIME [id number] 11:26:00





             Test Item    Value        Reference Range Interpretation Comments

 

             THROMBOPLASTIN TIME 35.9 Seconds 25.0-39.5    N                Ther

apeutic



             PARTIAL (test code =                                        Range: 

50.4 - 88.3



             PTT)                                                Seconds        

****



                                                                 Effective



                                                                 2019 ****



BASIC METABOLIC [id number] 11:25:00





             Test Item    Value        Reference Range Interpretation Comments

 

             SODIUM (test code = NA) 145 mEq/L    134-147      N            

 

             POTASSIUM (test code = 3.9 mEq/L    3.4-5.0      N            



             K)                                                  

 

             CHLORIDE (test code = 111 mEq/L    100-108      H            



             CL)                                                 

 

             CARBON DIOXIDE (test 26 mEq/l     21-33        N            



             code = CO2)                                         

 

             ANION GAP (test code = 12           0-20         N            



             GAP)                                                

 

             GLUCOSE (test code = 123 mg/dL           H            



             GLU)                                                

 

             BLOOD UREA NITROGEN 18 mg/dL     7-18         N            



             (test code = BUN)                                        

 

             GLOMERULAR FILTRATION 59.5         70-80        L            Units 

of measure =



             RATE (test code = GFR)                                        ml/mi

n/1.73 m2

 

             CREATININE (test code = 1.2 mg/dL    0.6-1.3      N            



             CREAT)                                              

 

             CALCIUM (test code = 8.9 mg/dL    8.0-10.5     N            



             CA)                                                 



[id number] 11:25:00





             Test Item    Value        Reference Range Interpretation Comments

 

             ALBUMIN (test code = ALB) 3.90 g/dL    3.4-5.0      N            



- XR CHEST 2 -14-12 11:16:00
[facility]        : 1950        Sex: 
M************************************************************ FAX: Reji Leger [phone]    [place]:   St: PRE FAX: Aida Fernandez [phone]   
------------------------------------------------------------------------------- 
 Name:   DEISI RICHTER                  [facility]                    
: 1950 Age/S: 71/M           [address] [place] [address]         Unit #: 
[id number]      Loc: [place] [address]                 Phys: 
Reji Muhammad MD   Acct: [id number] Dis Date:               Status: 
PRE SDC                                PHONE #: [phone]     Exam Date:     
2021     1104                   FAX #: [phone]     Reason: PREOP     
                                         EXAMS:     CPT CODE:      [id number] XR 
CHEST 2 V                               [id number]                    CHEST 
RADIOGRAPHS - PA AND LATERAL:                COMPARISON: May 20, 2021           
    CLINICAL HISTORY: PREOP               Moderate cardiomegaly noted.          
     Mild vascular/interstitial prominence is seen. No focal infiltrates       
noted.               There is mild thickening of the fissures. No evidence of 
pneumothorax.                 IMPRESSION:                    Cardiomegaly.      
   Mild vascular/interstitial prominence. The possibility mild early         
vascular congestion is raised.          ** Electronically Signed by RADHA Quiñones on 2021 at 1116 **             Reported and signed by: Jeffy Quiñones M.D.                    CC: Reji Muhammad MD; Aida Maynard MD   
          Technologist: RT Darlene(WESLY)                                  
Trnscrd Date/Time/By: 2021 (1116) : By: [email or web address]          Orig Print 
D/T: S: 2021 (9327)       PAGE  1                       Signed ReportCBC 
W/AUTO [id number] 11:13:00





             Test Item    Value        Reference Range Interpretation Comments

 

             WHITE BLOOD CELL (test code =  x10 3/uL    4.5-11.0                

  



             WBC)                                                

 

             RED BLOOD CELL (test code = RBC)  x10 6/uL    4.00-5.60            

     

 

             HEMOGLOBIN (test code = HGB)  g/dL        12.5-16.9                

 

 

             HEMATOCRIT (test code = HCT)  %           37.5-50.7                

 

 

             MEAN CELL VOLUME (test code =  fL          81.0-99.0               

  



             MCV)                                                

 

             MEAN CELL HGB (test code = MCH)  pg          27.0-33.0             

    

 

             MEAN CELL HGB CONCETRATION (test  g/dL        33.0-37.0            

     



             code = MCHC)                                        

 

             RED CELL DISTRIBUTION WIDTH CV  %           11.5-14.5              

   



             (test code = RDW)                                        

 

             PLATELET COUNT (test code = PLT) 162 x10 3/uL 150-400      N       

     

 

             NEUTROPHIL % (test code = NT%)  %           56.0-77.0              

   

 

             LYMPHOCYTE % (test code = LY%)  %           14.0-32.0              

   

 

             NEUTROPHIL # (test code = NT#)  x10 3/uL    2.0-7.6                

   

 

             LYMPHOCYTE # (test code = LY#)  x10 3/uL    1.0-3.8                

   

 

             MANUAL DIFF REQUIRED (test code                                    

    



             = MDIFF)                                            



CBC W/AUTO [id number] 11:13:00





             Test Item    Value        Reference Range Interpretation Comments

 

             WHITE BLOOD CELL (test code = 10.1 x10 3/uL 4.5-11.0     N         

   



             WBC)                                                

 

             RED BLOOD CELL (test code = 4.02 x10 6/uL 4.00-5.60    N           

 



             RBC)                                                

 

             HEMOGLOBIN (test code = HGB) 11.8 g/dL    12.5-16.9    L           

 

 

             HEMATOCRIT (test code = HCT) 37.4 %       37.5-50.7    L           

 

 

             MEAN CELL VOLUME (test code = 93.0 fL      81.0-99.0    N          

  



             MCV)                                                

 

             MEAN CELL HGB (test code = MCH) 29.4 pg      27.0-33.0    N        

    

 

             MEAN CELL HGB CONCETRATION 31.6 g/dL    33.0-37.0    L            



             (test code = MCHC)                                        

 

             RED CELL DISTRIBUTION WIDTH CV 14.3 %       11.5-14.5    N         

   



             (test code = RDW)                                        

 

             RED CELL DISTRIBUTION WIDTH SD 49.3 fL      37.0-54.0    N         

   



             (test code = RDW-SD)                                        

 

             PLATELET COUNT (test code = 162 x10 3/uL 150-400      N            



             PLT)                                                

 

             MEAN PLATELET VOLUME (test code 11.9 fL      7.0-9.0      H        

    



             = MPV)                                              

 

             NEUTROPHIL % (test code = NT%) 72.1 %       56.0-77.0    N         

   

 

             IMMATURE GRANULOCYTE % (test 1.3 %        0.0-2.0      N           

 



             code = IG%)                                         

 

             LYMPHOCYTE % (test code = LY%) 11.9 %       14.0-32.0    L         

   

 

             MONOCYTE % (test code = MO%) 7.4 %        4.8-9.0      N           

 

 

             EOSINOPHIL % (test code = EO%) 6.1 %        0.3-3.7      H         

   

 

             BASOPHIL % (test code = BA%) 1.2 %        0.0-2.0      N           

 

 

             NUCLEATED RBC % (test code = 0.0 %        0-0          N           

 



             NRBC%)                                              

 

             NEUTROPHIL # (test code = NT#) 7.26 x10 3/uL 2.0-7.6      N        

    

 

             IMMATURE GRANULOCYTE # (test 0.13 x10 3/uL 0.00-0.03    H          

  



             code = IG#)                                         

 

             LYMPHOCYTE # (test code = LY#) 1.20 x10 3/uL 1.0-3.8      N        

    

 

             MONOCYTE # (test code = MO#) 0.75 x10 3/uL 0.1-0.8      N          

  

 

             EOSINOPHIL # (test code = EO#) 0.61 x10 3/uL 0.0-0.2      H        

    

 

             BASOPHIL # (test code = BA#) 0.12 x10 3/uL 0.0-0.2      N          

  

 

             NUCLEATED RBC # (test code = 0.00 x10 3/uL 0.0-0.1      N          

  



             NRBC#)                                              

 

             MANUAL DIFF REQUIRED (test code NO                                 

    



             = MDIFF)                                            



- CTA ABD PEL W [id number] 15:58:00
[facility]        : 1950        Sex: 
M************************************************************  Name: 
DEISI RICHTER                   [facility]                    : 
1950 Age/S: 70  / M         [address] [place] [address]         Unit #: 
[id number]     Loc:               [place] [address]                 Phys: 
Dana Mosqueda  FN     Acct: [id number]  Dis Date:               Status: REG 
CLI                                 PHONE #: [phone]     Exam Date: 
2021  1346                     FAX #: [phone]      Reason: 135.0, 
AORTIC STENOSIS.                             EXAMS:      CPT CODE:      
[id number] CTA ABD PEL W CONT                         [id number]                    CH
EST, ABDOMEN AND PELVIS CT ANGIOGRAM WITH AND WITHOUT IV CONTRAST       WITH 
MULTIPLANAR REFORMATS AND 3D RECONSTRUCTIONS (TAVR PROTOCOL).                   
   DATE: 2021 12:29 PM : 1950; Age: 70 years  y/o Male             
  INDICATION: Aortic stenosis               ADMINISTERED CONTRAST: 100 mL of 
Isovue 300 intravenously.       DLP: 2238 mGy-cm                      CT imaging
 performed at this location utilizes radiation dose       optimization 
techniques which include one or more ofthe following:       -Automated exposure 
control       -Adjustment of the mA and/or kV according to patient size       -
Use of iterative reconstruction technique               FINDINGS: Contiguous 0.5
 mm axial images of the chest, abdomen and       pelvis were obtained with IV 
contrast using the CT angiogram protocol.       The acquired data was used to 
create 5 mm axial reconstructions       coronalreformatted images and 3-D 
reconstructions with the use of the       Workstation.    CHEST: Estimated 
aortic diameters are as follows:               Aortic annulus: 3.3 cm       Aort
ic root: 4.3 cm       Sinotubular junction: 3.9 cm       Right cusp height (to 
RCA takeoff): 1.8 cm     Left cusp height (to Left main takeoff): 1.5 cm       
Mid ascending aorta: 4.1 cm       Mid transverse arch: 3.1 cm       Descending 
thoracic aorta at the level of the pulmonary arteries: 3 cm           Mild to 
moderate cardiomegaly with coronary arteries calcification.        No 
pericardialeffusion.  Moderate aortic root calcification.  Minimal       
scattered thoracic aortic calcifications.  Ectatic pulmonary arteries.       
Multiple mediastinal nodes measuring up to 2.1 cm in the pretracheal       
region.  No pleural effusion.  Minimal pulmonary emphysema with       
peribronchiolar thickening.  No consolidation 4 mm noncalcified nodule       in 
the left upper lobe on image 26 of series 4.  6 mm noncalcified       nodule in 
the left upper lobe on image 28.                        ABDOMEN: Estimated 
aortoiliac/iliofemoral arterial diameters are as       follows:             PAGE
  1                   Signed Report                    (CONTINUED)   Name: 
DEISI RICHTER     [facility]                    : 1950 Age/S: 70
  / M         [address] [place] [address] Blvd         Unit #: [id number]     Loc:       
        [place] [address]                 Phys: Dana Mosqueda  KATLYN             
                                    Acct: [id number]  Dis Date:  Status: REG 
CLI                                 PHONE #: [phone]     Exam Date: 
2021  1344                     FAX #: [phone]      Reason: 135.0, 
AORTIC STENOSIS.       EXAMS:                                               CPT 
CODE:      [id number] CTA ABD PEL W CONT                         [id number]           
    &lt;Continued&gt;        Abdominal aorta just below the renal arteries: 2.1 
cm       Distal abdominal aorta at iliac bifurcation: 1.9 cm       Right common 
iliac artery: 1.4 cm       Left common iliac artery: 1.3 cm       Right common 
femoral artery: 1.2 cm       Left common femoral artery: 1.1 cm                
Mild atherosclerotic changes at the originof the celiac artery.  SMA       is 
grossly patent. Atherosclerotic changes are seen involving the    aorta.  
Detailed evaluation of the renal arteries is limited due to       streak 
artifacts.  Mild to moderate atherosclerotic changes are       grossly noted 
involving the proximal left renal artery.  Mild to       moderate 
atherosclerotic calcifications involving the bilateral common       iliac a
rteries with left more than right.  Moderate focal stenosis at       the origin 
of the left externaliliac artery without occlusion.        Patent right external
 iliac artery.       Streak artifacts limits detailed evaluation of the abdomen 
and pelvis.        Liver, gallbladder, adrenal glands, kidneys, spleen and 
pancreas       appears grossly unremarkable.  Small hiatal hernia.  No bowel    
   obstruction.  Moderate sigmoid colon diverticulosis. Bladder is not       
well distended limiting its evaluation.  Left femoral hardware       partially 
seen.  Moderate to severe lumbar spine degenerative changes.        No acute 
fracture.                           IMPRESSION:           1. Mild to moderate
cardiomegaly with coronary artery disease and         probable pulmonary 
hypertension.           2. Moderate aortic root calcification and minimal 
thoracic aortic         calcifications.  Aneurysmal ascending thoracic aorta.   
      3. Nonspecific mediastinal lymphadenopathy measuring up to 2.1 cm.     4. 
Detailed evaluation of abdomen and pelvis is limited due to streak         
artifacts.  Thereappears to be moderate focal stenosis at the origin         of 
the left external iliac artery without occlusion.         5. Moderate sigmoid 
colon diverticulosis.         6.  Few noncalcified lung nodules measuring up to 
6 mm.  If the         patient has a history of smoking or other risk factor to 
increase         their risk of malignancy, then follow-up CT at 6-12 months and 
18-24         months isrecommended.  If the patient has no such risk factors, 
then         follow-up chest CT recommended at 6-12 months with consideration 
for         CT at 18-24 months.***                     ***Reference:Guidelines 
for Management of Incidental Pulmonary         Nodules Detected on CT Images: 
From the Fleischner Society 2017.               PAGE  2                       
Signed Report                    (CONTINUED)   Name: DEISI RICHTER            
       [facility]                    : 1950 Age/S: 70  / M        
 [address] [place], [place] [address]vd         Unit #: [id number]     Loc:               [place] [address]                 Phys: Dana Mosqueda     Acct: [id number]
  Dis Date:               Status: REG CLI                                 PHONE 
#: [phone]     Exam Date: 2021  1343                     FAX #: 
[phone]      Reason: 135.0, AORTIC STENOSIS.                             
EXAMS:      CPT CODE:      [id number] CTA ABD PEL W CONT                         
[address]               &lt;Continued&gt;          *******************FOR INTERNAL 
CODING PURPOSES ONLY****************         RESULT CODE: NOD                   
 ** Electronically Signed by NESTOR Anderson **            **        on 
2021 at 1558            **                      Reported and signed by: 
Kira Anderson D.O.                                   CC: Dana Mosqueda; 
Reji Muhammad MD                                                         
                         Technologist:RT Christy(R)            CTDI:     
   DLP:        Trnscb Date/Time: 2021 () LiMP37                   
Orig Print D/T: S: 2021 (512)      PAGE  3                       Signed 
Report- CT ANGIO [id number] 15:58:00
************************************************************[facility]        : 1950        Sex: 
M************************************************************  Name: 
DEISI RICHTER                   [facility]                    : 
1950 Age/S: 70  / M         [address] [place] [address] Blvd         Unit #: 
[id number]     Loc:               [place] [address]                 Phys: 
Dana Mosqueda     Acct: [id number]  Dis Date:               Status: REG 
CLI                                 PHONE #: [phone]     Exam Date: 
2021  1346                     FAX #: [phone]      Reason: 135.0 , 
AORTIC STENOSIS.                            EXAMS:      CPT CODE:      [id number]
 CT ANGIO CHEST                             [address]                    CHEST, 
ABDOMEN AND PELVIS CT ANGIOGRAM WITH AND WITHOUT IV CONTRAST       WITH 
MULTIPLANAR REFORMATS AND 3D RECONSTRUCTIONS (TAVR PROTOCOL).                   
   DATE: 2021 12:29 PM : 1950; Age: 70 years  y/o Male             
  INDICATION: Aortic stenosis               ADMINISTERED CONTRAST: 100 mL of 
Isovue 300 intravenously.       DLP: 2238 mGy-cm                      CT imaging
 performed at this location utilizes radiation dose       optimization 
techniques which include one or more ofthe following:       -Automated exposure 
control       -Adjustment of the mA and/or kV according to patient size       -
Use of iterative reconstruction technique               FINDINGS: Contiguous 0.5
 mm axial images of the chest, abdomen and       pelvis were obtained with IV 
contrast using the CT angiogram protocol.       The acquired data was used to 
create 5 mm axial reconstructions       coronalreformatted images and 3-D 
reconstructions with the use of the       Workstation.    CHEST: Estimated 
aortic diameters are as follows:               Aortic annulus: 3.3 cm       Aort
ic root: 4.3 cm       Sinotubular junction: 3.9 cm       Right cusp height (to 
RCA takeoff): 1.8 cm     Left cusp height (to Left main takeoff): 1.5 cm       
Mid ascending aorta: 4.1 cm       Mid transverse arch: 3.1 cm       Descending 
thoracic aorta at the level of the pulmonary arteries: 3 cm           Mild to 
moderate cardiomegaly with coronary arteries calcification.        No 
pericardialeffusion.  Moderate aortic root calcification.  Minimal       
scattered thoracic aortic calcifications.  Ectatic pulmonary arteries.       
Multiple mediastinal nodes measuring up to 2.1 cm in the pretracheal       
region.  No pleural effusion.  Minimal pulmonary emphysema with       
peribronchiolar thickening.  No consolidation 4 mm noncalcified nodule       in 
the left upper lobe on image 26 of series 4.  6 mm noncalcified       nodule in 
the left upper lobe on image 28.                        ABDOMEN: Estimated 
aortoiliac/iliofemoral arterial diameters are as       follows:             PAGE
  1                   Signed Report                    (CONTINUED)   Name: 
DEISI RICHTER     [facility]                    : 1950 Age/S: 70
  / M         [address] [place] [address]         Unit #: [id number]     Loc:       
        [place] [address]                 Phys: Dana Mosqueda  KATLYN             
                                    Acct: [id number]  Dis Date:  Status: REG 
CLI                                 PHONE #: [phone]     Exam Date: 
2021  134                     FAX #: [phone]      Reason: 135.0 , 
AORTIC STENOSIS.       EXAMS:                                               CPT 
CODE:      [id number] CT ANGIO CHEST                           [address]             
  &lt;Continued&gt;        Abdominal aorta just below the renal arteries: 2.1 cm
       Distal abdominal aorta at iliac bifurcation: 1.9 cm       Right common 
iliac artery: 1.4 cm       Left common iliac artery: 1.3 cm       Right common 
femoral artery: 1.2 cm       Left common femoral artery: 1.1 cm                
Mild atherosclerotic changes at the originof the celiac artery.  SMA       is 
grossly patent. Atherosclerotic changes are seen involving the    aorta.  
Detailed evaluation of the renal arteries is limited due to       streak 
artifacts.  Mild to moderate atherosclerotic changes are       grossly noted 
involving the proximal left renal artery.  Mild to       moderate 
atherosclerotic calcifications involving the bilateral common       iliac a
rteries with left more than right.  Moderate focal stenosis at       the origin 
of the left externaliliac artery without occlusion.        Patent right external
 iliac artery.       Streak artifacts limits detailed evaluation of the abdomen 
and pelvis.        Liver, gallbladder, adrenal glands, kidneys, spleen and 
pancreas       appears grossly unremarkable.  Small hiatal hernia.  No bowel    
   obstruction.  Moderate sigmoid colon diverticulosis. Bladder is not       
well distended limiting its evaluation.  Left femoral hardware       partially 
seen.  Moderate to severe lumbar spine degenerative changes.        No acute 
fracture.                           IMPRESSION:           1. Mild to moderate
cardiomegaly with coronary artery disease and         probable pulmonary 
hypertension.           2. Moderate aortic root calcification and minimal 
thoracic aortic         calcifications.  Aneurysmal ascending thoracic aorta.   
      3. Nonspecific mediastinal lymphadenopathy measuring up to 2.1 cm.     4. 
Detailed evaluation of abdomen and pelvis is limited due to streak         
artifacts.  Thereappears to be moderate focal stenosis at the origin         of 
the left external iliac artery without occlusion.         5. Moderate sigmoid 
colon diverticulosis.         6.  Few noncalcified lung nodules measuring up to 
6 mm.  If the         patient has a history of smoking or other risk factor to 
increase         their risk of malignancy, then follow-up CT at 6-12 months and 
18-24         months isrecommended.  If the patient has no such risk factors, 
then         follow-up chest CT recommended at 6-12 months with consideration 
for         CT at 18-24 months.***                     ***Reference:Guidelines 
for Management of Incidental Pulmonary         Nodules Detected on CT Images: 
From the Fleischner Society 2017.               PAGE  2                       
Signed Report                    (CONTINUED)   Name: DEISI RICHTER            
       [facility]                    : 1950 Age/S: 70  / M        
 [address] [place] [address]         Unit #: [id number]     Loc:               [place] [address]                 Phys: Dana Mosqueda     Acct: [id number]
  Dis Date:               Status: REG CLI                                 PHONE 
#: [phone]     Exam Date: 2021  1346                     FAX #: 
[phone]      Reason: 135.0 , AORTIC STENOSIS.                            
EXAMS:      CPT CODE:      [id number] CT ANGIO CHEST                             
[address]               &lt;Continued&gt;          *******************FOR INTERNAL 
CODING PURPOSES ONLY****************         RESULT CODE: NOD                   
 ** Electronically Signed by NESTOR Anderson **            **        on 
2021 at 1558            **                      Reported and signed by: 
Kira Anderson D.O.                                   CC: Dana Mosqueda; 
Reji Muhammad MD                                                         
                         Technologist:RT Christy(R)            CTDI:     
   DLP:        Trnscb Date/Time: 2021 () t.SDR.MP37                   
Orig Print D/T: S: 2021 (1618)      PAGE  3                       Signed 
Report- CTA HEART W CN [id number] 15:58:00
************************************************************[facility]        : 1950        Sex: 
M************************************************************  Name: 
DEISI RICHTER                   [facility]                    : 
1950 Age/S: 70  / M         [address] [place] [address] Blvd         Unit #: 
[id number]     Loc:               [place] [address]                 Phys: 
Dana Mosqueda     Acct: [id number]  Dis Date:               Status: REG 
CLI                                 PHONE #: [phone]     Exam Date: 
2021  1346                     FAX #: [phone]      Reason:           
                                          EXAMS:      CPT CODE:      [id number] 
CTA HEART W CN ART/GRAFTS                  [id number]                    CHEST, 
ABDOMEN AND PELVIS CT ANGIOGRAM WITH AND WITHOUT IV CONTRAST       WITH 
MULTIPLANAR REFORMATS AND 3D RECONSTRUCTIONS (TAVR PROTOCOL).                   
   DATE: 2021 12:29 PM : 1950; Age: 70 years  y/o Male             
  INDICATION: Aortic stenosis               ADMINISTERED CONTRAST: 100 mL of 
Isovue 300 intravenously.       DLP: 2238 mGy-cm                      CT imaging
 performed at this location utilizes radiation dose       optimization 
techniques which include one or more ofthe following:       -Automated exposure 
control       -Adjustment of the mA and/or kV according to patient size       -
Use of iterative reconstruction technique               FINDINGS: Contiguous 0.5
 mm axial images of the chest, abdomen and       pelvis were obtained with IV 
contrast using the CT angiogram protocol.       The acquired data was used to 
create 5 mm axial reconstructions       coronalreformatted images and 3-D 
reconstructions with the use of the       Workstation.    CHEST: Estimated 
aortic diameters are as follows:               Aortic annulus: 3.3 cm       Aort
ic root: 4.3 cm       Sinotubular junction: 3.9 cm       Right cusp height (to 
RCA takeoff): 1.8 cm     Left cusp height (to Left main takeoff): 1.5 cm       
Mid ascending aorta: 4.1 cm       Mid transverse arch: 3.1 cm       Descending 
thoracic aorta at the level of the pulmonary arteries: 3 cm           Mild to 
moderate cardiomegaly with coronary arteries calcification.        No 
pericardialeffusion.  Moderate aortic root calcification.  Minimal       
scattered thoracic aortic calcifications.  Ectatic pulmonary arteries.       
Multiple mediastinal nodes measuring up to 2.1 cm in the pretracheal       
region.  No pleural effusion.  Minimal pulmonary emphysema with       
peribronchiolar thickening.  No consolidation 4 mm noncalcified nodule       in 
the left upper lobe on image 26 of series 4.  6 mm noncalcified       nodule in 
the left upper lobe on image 28.                        ABDOMEN: Estimated 
aortoiliac/iliofemoral arterial diameters are as       follows:             PAGE
  1                   Signed Report                    (CONTINUED)   Name: 
DEISI RICHTER     [facility]                    : 1950 Age/S: 70
  / M         [address] [place], [place] [address]         Unit #: [id number]     Loc:       
        [place] [address]                 Phys: Dana Mosqueda  FN             
                                    Acct: [id number]  Dis Date:  Status: REG 
CLI                                 PHONE #: [phone]     Exam Date: 
2021  1346                     FAX #: [phone]      Reason:       
EXAMS:                                               CPT CODE:      [id number] 
CTA HEART W CN ART/GRAFTS                  [id number]               &lt;Continued&gt;
        Abdominal aorta just below the renal arteries: 2.1 cm       Distal 
abdominal aorta at iliac bifurcation: 1.9 cm       Right common iliac artery: 
1.4 cm       Left common iliac artery: 1.3 cm       Right common femoral artery:
 1.2 cm       Left common femoral artery: 1.1 cm                Mild 
atherosclerotic changes at the originof the celiac artery.  SMA       is grossly
 patent. Atherosclerotic changes are seen involving the    aorta.  Detailed 
evaluation of the renal arteries is limited due to       streak artifacts.  Mild
 to moderate atherosclerotic changes are       grossly noted involving the 
proximal left renal artery.  Mild to       moderate atherosclerotic 
calcifications involving the bilateral common       iliac arteries with left 
more than right.  Moderate focal stenosis at       the origin of the left 
externaliliac artery without occlusion.        Patent right external iliac 
artery.       Streak artifacts limits detailed evaluation of the abdomen and 
pelvis.        Liver, gallbladder, adrenal glands, kidneys, spleen and pancreas 
      appears grossly unremarkable.  Small hiatal hernia.  No bowel       obstr
uction.  Moderate sigmoid colon diverticulosis. Bladder is not       well 
distended limiting its evaluation.  Left femoral hardware       partially seen. 
 Moderate to severe lumbar spine degenerative changes.        No acute fracture.
                           IMPRESSION:           1. Mild to moderatecardiomegaly
 with coronary artery disease and         probable pulmonary hypertension.      
     2. Moderate aortic root calcification and minimal thoracic aortic         
calcifications.  Aneurysmal ascending thoracic aorta.         3. Nonspecific 
mediastinal lymphadenopathy measuring up to 2.1 cm.     4. Detailed evaluation 
of abdomen and pelvis is limited due to streak         artifacts.  Thereappears 
to be moderate focal stenosis at the origin         of the left external iliac 
artery without occlusion.         5. Moderate sigmoid colon diverticulosis.     
    6.  Few noncalcified lung nodules measuring up to 6 mm.  If the         
patient has a history of smoking or other risk factor to increase         their 
risk of malignancy, then follow-up CT at 6-12 months and 18-24         months is
recommended.  If the patient has no such risk factors, then         follow-up 
chest CT recommended at 6-12 months with consideration for         CT at 18-24 
months.***                     ***Reference:Guidelines for Management of 
Incidental Pulmonary         Nodules Detected on CT Images: From the Fleischner 
Society 2017.               PAGE  2                       Signed Report         
           (CONTINUED)   Name: DEISI RICHTER                   [facility]
                    : 1950 Age/S: 70  / M         [address] [place] [address] 
Bl         Unit #: [id number]     Loc:               [place] [address]        
         Phys: Dana Mosqueda     Acct: [id number]  Dis Date:            
   Status: REG CLI                                 PHONE #: [phone]     
Exam Date: 2021  1346                     FAX #: [phone]      Reason:
                                                     EXAMS:      CPT CODE:      
[id number] CTA HEART W CN ART/GRAFTS                  [id number]               &lt;Con
tinued&gt;          *******************FOR INTERNAL CODING PURPOSES 
ONLY****************         RESULT CODE: NOD                    ** 
Electronically Signed by NESTOR Anderson **            **        on 
2021 at 1558            **                      Reported and signed by: 
Kira Anderson D.O.                                   CC: Dana Muhammad MD                                                         
                         Technologist:Dimitry Sanchez, RT(R)            CTDI:     
   DLP:        Trnscb Date/Time: 2021 (1558) [id number]                   
Orig Print D/T: S: 2021 (9231)      PAGE  3                       Signed 
ReportCOVID 19 Asymptomatic IH [id number] 12:30:00





             Test Item    Value        Reference Range Interpretation Comments

 

             COVID 19 Asymptomatic Negative     Negative                  A nega

tive result is



             IH AG (test code =                                        presumpti

ve and should



             COVNONPUIAG)                                        be confirmedwit

h an FDA



                                                                 authorized mole

cular



                                                                 assay, if neces

nancy



                                                                 forpatient autumn

gement.A



                                                                 positive result

 does not



                                                                 rule out co-inf

ections



                                                                 withother patho

gens.This



                                                                 test detects pj

th viable



                                                                 (live) and



                                                                 non-viable,SARS

-CoV, and



                                                                 SARS-CoV-2. Alexis

t



                                                                 performance dep

ends on



                                                                 theamount of vi

jennifer



                                                                 (antigen) in th

e



                                                                 sample.This alexis

t has not



                                                                 been FDA cleare

d or



                                                                 approved; the t

est



                                                                 hasbeen authori

zed by



                                                                 FDA under an Em

ergency



                                                                 Use Authorizati

on(EUA)



                                                                 for use by labo

ratories



                                                                 certified under

 the CLIA



                                                                 thatmeet the



                                                                 requirements to

 perform



                                                                 moderate, high 

or



                                                                 waivedcomplexit

y tests.

## 2022-02-10 LAB
ALBUMIN SERPL BCP-MCNC: 3.6 G/DL (ref 3.4–5)
ALP SERPL-CCNC: 71 U/L (ref 45–117)
ALT SERPL W P-5'-P-CCNC: 266 U/L (ref 12–78)
AST SERPL W P-5'-P-CCNC: 252 U/L (ref 15–37)
BUN BLD-MCNC: 44 MG/DL (ref 7–18)
COHGB MFR BLDA: 1.6 % (ref 0–1.5)
GLUCOSE SERPLBLD-MCNC: 166 MG/DL (ref 74–106)
MORPHOLOGY BLD-IMP: (no result)
OXYHGB MFR BLDA: 92.8 % (ref 94–97)
POTASSIUM SERPL-SCNC: 4.4 MMOL/L (ref 3.5–5.1)
SAO2 % BLDA: 95.2 % (ref 92–98.5)
TROPONIN I SERPL HS-MCNC: 98.5 PG/ML (ref ?–58.9)
UA DIPSTICK W REFLEX MICRO PNL UR: (no result)
URINE COARSE GRANULAR CASTS: (no result) /LPF

## 2022-02-10 PROCEDURE — 5A09357 ASSISTANCE WITH RESPIRATORY VENTILATION, LESS THAN 24 CONSECUTIVE HOURS, CONTINUOUS POSITIVE AIRWAY PRESSURE: ICD-10-PCS

## 2022-02-10 RX ADMIN — RIVAROXABAN SCH MG: 20 TABLET, FILM COATED ORAL at 17:54

## 2022-02-10 RX ADMIN — ROSUVASTATIN CALCIUM SCH MG: 10 TABLET, COATED ORAL at 21:20

## 2022-02-10 RX ADMIN — DEXTROSE MONOHYDRATE SCH MLS: 50 INJECTION, SOLUTION INTRAVENOUS at 16:44

## 2022-02-10 RX ADMIN — Medication SCH ML: at 09:00

## 2022-02-10 RX ADMIN — DEXTROSE MONOHYDRATE SCH MLS: 50 INJECTION, SOLUTION INTRAVENOUS at 21:19

## 2022-02-10 RX ADMIN — METOPROLOL TARTRATE SCH MG: 25 TABLET ORAL at 21:21

## 2022-02-10 RX ADMIN — AMIODARONE HYDROCHLORIDE SCH MG: 200 TABLET ORAL at 09:41

## 2022-02-10 RX ADMIN — CLOPIDOGREL BISULFATE SCH MG: 75 TABLET, FILM COATED ORAL at 09:36

## 2022-02-10 RX ADMIN — HUMAN INSULIN SCH: 100 INJECTION, SOLUTION SUBCUTANEOUS at 11:30

## 2022-02-10 RX ADMIN — HUMAN INSULIN SCH: 100 INJECTION, SOLUTION SUBCUTANEOUS at 07:30

## 2022-02-10 RX ADMIN — AMIODARONE HYDROCHLORIDE SCH MG: 200 TABLET ORAL at 21:21

## 2022-02-10 RX ADMIN — DEXTROSE MONOHYDRATE SCH MLS: 50 INJECTION, SOLUTION INTRAVENOUS at 11:32

## 2022-02-10 RX ADMIN — SODIUM CHLORIDE SCH MLS: 9 INJECTION, SOLUTION INTRAVENOUS at 04:57

## 2022-02-10 RX ADMIN — HUMAN INSULIN SCH: 100 INJECTION, SOLUTION SUBCUTANEOUS at 16:30

## 2022-02-10 RX ADMIN — HUMAN INSULIN SCH: 100 INJECTION, SOLUTION SUBCUTANEOUS at 21:00

## 2022-02-10 RX ADMIN — Medication SCH ML: at 21:21

## 2022-02-10 RX ADMIN — METOPROLOL TARTRATE SCH MG: 25 TABLET ORAL at 14:00

## 2022-02-10 NOTE — CON
Date of Consultation:  02/10/2022



Reason For Consultation:  AFib and CHF.



History Of Present Illness:  A 71-year-old male with history of coronary artery disease, CHF.  He had
 a stent of the LAD in the past few months followed by TAVR for severe aortic valve stenosis.  He is 
currently in atrial fibrillation and status post left atrial appendage closure.  He was doing well up
 until the last few days.  He started getting short of breath and minimal cough and he went into rapi
d atrial fibrillation, which we could not control as an outpatient, so the patient was directed to [facility] and found to have positive COVID pneumonia.  We evaluated him by bedside.  He was comforta
ble on the BiPAP, in no acute distress.



Past Medical History:  As outlined above in HPI.



Medications:  Refer to reconciliation sheet for detailed list.



Allergies:  TO PENICILLIN.



Family History:  No premature coronary artery disease or cancer.



Social History:  He does not smoke or drink.  Does not use any drugs.



Review of Systems:

All systems reviewed and they were negative except for what mentioned in HPI.



Physical Examination:

Vital Signs:  Reviewed. 

Head and Nec:  Pupils are equal, reactive to light.  Intact eye movements.  No JVD.  No cervical lymp
hadenopathy.  Neck is supple.  Thyroid is not enlarged. 

Lungs:  Rhonchi bilaterally.  No accessory muscle use or muscle retraction. 

Heart:  Irregularly irregular.  No extra sounds. 

Abdomen:  Soft, nontender.  Bowel sounds positive.  No organomegaly.  No masses or hernia.  No rigidi
ty or rebound. 

Extremities:  No clubbing or cyanosis.  Intact pulses. 

Skin:  No rash. 

Neuro:  Alert, awake, oriented x3.  No acute focal deficits appreciated.



Investigations:  White blood cell count is 23,000, hemoglobin 12, and platelet count is 119.  Troponi
n is 94, which is trending down and creatinine is 2.2.



Assessment And Recommendations:  

1.Acute respiratory failure due to COVID pneumonia and mild congestive heart failure.  Diuresis caut
iously is recommended.  Carefully monitor BUN, creatinine, and electrolytes.

2.Atrial fibrillation, rate is slightly controlled.  This is still elevated due to the active COVID 
infection.  I will give 1 dose of digoxin 0.125 mg IV, which is already given and continue oral beta-
blocker and amiodarone and carefully please monitor his LFTs while on amiodarone.

3.Coronary artery disease, borderline troponin leak or chest pains, likely demand ischemia.

4.Congestive heart failure.  His ejection fraction in the 40% to 45% range.  Agree with diuresis, mo
nitoring his BUN and creatinine, and also the patient is being followed by Nephrology and Pulmonary. 
 I will follow the patient with you on daily basis. 

Thank you for the consult.





AGUSTÍN

DD:  02/10/2022 15:36:54Voice ID:  [id number]

DT:  02/10/2022 17:38:10Report ID:  [id number]

## 2022-02-10 NOTE — RAD REPORT
EXAM DESCRIPTION:  US - Renal Ultrasound-Complete - 2/10/2022 2:34 pm

 

CLINICAL HISTORY:  ORACIO

 

COMPARISON:  Abdomen Exam Limited dated 2/10/2022

 

FINDINGS:  Both kidneys are normal in size, shape and echotexture.

 

The right kidney measures 11.3 cm. Small echogenic focus in the interpolar aspect of the left kidney 
measuring 3 millimeters. No hydronephrosis or mass.

 

The left kidney measures 11.3 cm. No hydronephrosis, focal mass or perinephric fluid.

 

The urinary bladder is incompletely distended without gross abnormality seen.

 

IMPRESSION:  No evidence of hydronephrosis. Nonobstructive right nephrolithiasis.

## 2022-02-10 NOTE — P.HP
Certification for Inpatient


Patient admitted to: Inpatient


With expected LOS: >2 Midnights


Patient will require the following post-hospital care: None


Practitioner: I am a practitioner with admitting privileges, knowledge of 

patient current condition, hospital course, and medical plan of care.


Services: Services provided to patient in accordance with Admission requirements

found in Title 42 Section 412.3 of the Code of Federal Regulations





<Andrew Bruner - Last Filed: 02/10/22 02:20>





Patient History


Date of Service: 02/10/22


Reason for admission: CHF exacerbation with pulmonary edema, hypoxia, ORACIO 


History of Present Illness: 


Mr. Ma is 72 yo M with CHF,CAD, HTN, HLD, atrial fibrillation on chronic 

anticoagulation, DORIS on CPAP who presents with acute respiratory distress, O2 

sats 81% on room air. He was placed on BIPAP, received IV lasix and nitropaste 

and improved. He reports for the past week he has become increasingly SOB with 

VELIZ. He also reports cough, wheezing, and lower leg edema. He saw his 

cardiologist on Monday for his atrial fibrillation and was started on metoprolol

25mg TID. COVID+, first tested positive at the end of December. Patient denies 

fever, nausea, vomiting. 





WBC 23 HCO3 17 BUN 44 Cr 2.22 GFR 29 Glu 166 





- Past Medical/Surgical History


Diabetic: No


-: HTN


-: hyperlipidemia


-: atrial fibrillation


-: GERD


-: MI


-: Obstructive sleep apnea


-: BILATERAL KNEE REPLACEMENT


-: RIGHT CAROTID SURGERY


-: BILATERAL HAND SURGERY TO RELEASE LITTLE FINGERS


-: MERLE IN LEFT FEMUR


-: cardiac stent


-: pacemaker


-: watchman 


-: aortic valve replacement 





- Family History


  ** Mother


-: Heart disease





  ** Brother


-: Heart disease, Stroke





- Social History


Smoking Status: Never smoker


Alcohol use: No


CD- Drugs: No


Caffeine use: Yes


Place of Residence: Home





<Andrew Bruner - Last Filed: 02/10/22 02:20>


Date of Service: 02/10/22





<Inessa Johnson - Last Filed: 02/14/22 01:24>


Allergies





Penicillins Allergy (Mild, Verified 04/28/21 10:14)


   Hives/Rash





Home Medications: 








Pantoprazole [Protonix Tab*] 40 mg PO DAILYAC #30 tab 12/24/16 


Rosuvastatin [Crestor*] 40 mg PO BEDTIME 02/02/17 


Furosemide [Lasix*] 40 mg PO DAILY PRN #30 tab 02/03/17 


Clopidogrel Bisulfate [Plavix*] 75 mg PO DAILY #30 tablet 12/09/19 


Amiodarone HCl [Pacerone] 200 mg PO BID 02/10/22 


Metoprolol Tartrate 25 mg PO TID 02/10/22 


Rivaroxaban [Xarelto] 20 mg PO DAILY 02/10/22 








Review of Systems


General: Unremarkable


Eyes: Unremarkable


ENT: Unremarkable


Respiratory: Cough, Shortness of Breath, SOB with Excertion, Wheezing


Cardiovascular: Edema


Gastrointestinal: Unremarkable


Genitourinary: Unremarkable


Musculoskeletal: Unremarkable


Integumentary: Unremarkable


Neurological: Unremarkable


Lymphatics: Unremarkable





<Andrew Bruner - Last Filed: 02/10/22 02:20>





Physical Examination





- Physical Exam


General: Alert, In no apparent distress, Obese


HEENT: Atraumatic, PERRLA, Mucous membr. moist/pink, EOMI, Sclerae nonicteric


Neck: Supple, 2+ carotid pulse no bruit, No LAD, Without JVD or thyroid 

abnormality


Respiratory: Diminished, Crackles/rales


Cardiovascular: Regular rate/rhythm, Normal S1 S2, Edema


Capillary refill: <2 Seconds


Gastrointestinal: Normal bowel sounds, No tenderness


Musculoskeletal: No tenderness


Integumentary: No rashes


Neurological: Normal speech, Normal strength at 5/5 x4 extr, Normal tone, Normal

 affect


Lymphatics: No axilla or inguinal lymphadenopathy





- Studies


Laboratory Data (last 24 hrs)





02/09/22 22:29: PT 15.4 H, INR 1.34


02/09/22 22:29: WBC 23.00 H*, Hgb 12.0 L, Hct 38.7 L, Plt Count 190


02/09/22 22:29: Sodium 139, Potassium 4.4, BUN 44 H, Creatinine 2.22 H, Glucose 

166 H,  H, Alkaline Phosphatase 71








<Andrew Bruner - Last Filed: 02/10/22 02:20>





Assessment and Plan





- Problems (Diagnosis)


(1) Leukocytosis


Current Visit: Yes   Status: Acute   


Qualifiers: 


   Leukocytosis type: unspecified   Qualified Code(s): D72.829 - Elevated white 

blood cell count, unspecified   





(2) Obstructive apnea


Current Visit: No   Status: Acute   





(3) CAD (coronary artery disease)


Current Visit: No   Status: Chronic   


Qualifiers: 


   Coronary Disease-Associated Artery/Lesion type: native artery   Native vs. 

transplanted heart: native heart   Associated angina: without angina   Qualified

 Code(s): I25.10 - Atherosclerotic heart disease of native coronary artery 

without angina pectoris   





(4) CHF (congestive heart failure)


Onset Date: 02/03/17   Current Visit: No   Status: Chronic   


Qualifiers: 


   Qualified Code(s): I50.23 - Acute on chronic systolic (congestive) heart 

failure   





(5) Hyperlipidemia


Current Visit: No   Status: Chronic   


Qualifiers: 


   Hyperlipidemia type: unspecified   Qualified Code(s): E78.5 - Hyperlipidemia,

 unspecified   





(6) Hypertension


Current Visit: No   Status: Chronic   


Qualifiers: 


   Hypertension type: essential hypertension   Qualified Code(s): I10 - 

Essential (primary) hypertension   





(7) ORACIO (acute kidney injury)


Current Visit: Yes   Status: Acute   





(8) Pulmonary edema


Current Visit: Yes   Status: Acute   


Qualifiers: 


   Chronicity: acute   Qualified Code(s): J81.0 - Acute pulmonary edema   





(9) Acute respiratory distress


Current Visit: Yes   Status: Acute   





- Plan


cardiology consulted


ECHO pending


continue IV lasix


daily weights, fluid restriction, low sodium diet


pulmonology consulted, RT consulted


wean BIPAP


nephrology consulted


procal, lactate, UA, blood cultures pending


reconcile and continue home medications 


Discharge Plan: Home


Plan to discharge in: Greater than 2 days





- Advance Directives


Does patient have a Living Will: No


Does patient have a Durable POA for Healthcare: No





- Code Status/Comfort Care


Code Status Assessed: Yes (full code )


Critical Care: No


Time Spent Managing Pts Care (In Minutes): 70





<Andrew Bruner - Last Filed: 02/10/22 02:20>





- Problems (Diagnosis)


(1) ORACIO (acute kidney injury)


Current Visit: Yes   Status: Acute   





(2) Acute respiratory distress


Current Visit: Yes   Status: Acute   





(3) Pulmonary edema


Current Visit: Yes   Status: Acute   


Qualifiers: 


   Chronicity: acute   Qualified Code(s): J81.0 - Acute pulmonary edema   





(4) Acute MI


Onset Date: 12/21/16   Current Visit: No   Status: Acute   





(5) Atrial fibrillation with RVR


Current Visit: No   Status: Acute   





(6) Chronic diastolic heart failure


Current Visit: No   Status: Acute   





(7) Obstructive apnea


Current Visit: No   Status: Acute   





<Inessa Johnson - Last Filed: 02/14/22 01:24>


Date of Service: 02/10/22





Subjective:


HPI as mentioned above





Physical Examination:


Vitals:  Afebrile vital signs are stable


Physical exam:


Cardiovascular:  Within normal limits.


Lungs:  Within normal limits


Abdomen:  Within normal limits


Neuro:  Awake, alert, oriented to person place and time





Assessment:


1.  Shortness of breath secondary to CHF and COPD exacerbations





Plan:


1.  Continue with current plan of care as mentioned above





<Inessa Johnson - Last Filed: 02/14/22 01:24>

## 2022-02-10 NOTE — RAD REPORT
EXAM DESCRIPTION:  RAD - Chest Single View - 2/9/2022 10:48 pm

 

CLINICAL HISTORY:  DYSPNEA

Chest pain.

 

COMPARISON:  Chest Pa And Lat (2 Views) dated 4/28/2021; Chest Single View dated 12/7/2019; Chest Sin
gle View dated 2/2/2017; Chest Pa And Lat (2 Views) dated 12/24/2016

 

FINDINGS:  Portable technique limits examination quality.

 

There is extensive bilateral pulmonary opacities present, greater on the left. This may represent pne
umonia or pulmonary edema. The heart is enlarged with multilead pacer/defibrillator device present.

## 2022-02-10 NOTE — P.CNS
Date of Consult: 02/10/22


Reason for Consult: Respiratory failure


Chief Complaint: CHF exacerbation with pulmonary edema, hypoxia, ORACIO 


History of Present Illness: 


Patient is 71 years of age with CHF coronary artery disease atrial fibrillation 

on chronic anticoagulation he also has sleep apnea compliant with CPAP admitted 

with respiratory distress he has been sick for the past 10 days is currently on 

BiPAP very comfortable tolerating it also has some lower extremity edema and was

seen by cardiologist recently tested positive for Covid


Doing a little better since admission


Allergies





Penicillins Allergy (Mild, Verified 04/28/21 10:14)


   Hives/Rash





Home Medications: 








Pantoprazole [Protonix Tab*] 40 mg PO DAILYAC #30 tab 12/24/16 


Rosuvastatin [Crestor*] 40 mg PO BEDTIME 02/02/17 


Furosemide [Lasix*] 40 mg PO DAILY PRN #30 tab 02/03/17 


Clopidogrel Bisulfate [Plavix*] 75 mg PO DAILY #30 tablet 12/09/19 


Amiodarone HCl [Pacerone] 200 mg PO BID 02/10/22 


Metoprolol Tartrate 25 mg PO TID 02/10/22 


Rivaroxaban [Xarelto] 20 mg PO DAILY 02/10/22 








- Past Medical/Surgical History


Diabetic: No


-: HTN


-: hyperlipidemia


-: atrial fibrillation


-: GERD


-: MI


-: Obstructive sleep apnea


-: BILATERAL KNEE REPLACEMENT


-: RIGHT CAROTID SURGERY


-: BILATERAL HAND SURGERY TO RELEASE LITTLE FINGERS


-: MERLE IN LEFT FEMUR


-: cardiac stent


-: pacemaker


-: watchman 


-: aortic valve replacement 





- Family History


  ** Mother


Medical History: Heart disease





  ** Brother


Medical History: Heart disease, Stroke





- Social History


Smoking Status: Unknown if ever smoked


Alcohol use: No


CD- Drugs: No


Caffeine use: Yes


Place of Residence: Home





Review of Systems


General: Weakness


Respiratory: Shortness of Breath


Cardiovascular: Edema





Physical Examination














Temp Pulse Resp BP Pulse Ox


 


 97.5 F   112 H  27 H  118/78   95 


 


 02/10/22 08:00  02/10/22 11:32  02/10/22 08:00  02/10/22 11:32  02/10/22 08:00








General: Alert, Mild distress


Respiratory: Clear to auscultation bilaterally, Diminished


Cardiovascular: Regular rate/rhythm, Normal S1 S2, Edema


Gastrointestinal: Normal bowel sounds, Soft and benign


Musculoskeletal: No clubbing, No swelling


Laboratory Data (last 24 hrs)





02/09/22 22:29: PT 15.4 H, INR 1.34


02/09/22 22:29: WBC 23.00 H*, Hgb 12.0 L, Hct 38.7 L, Plt Count 190


02/09/22 22:29: Sodium 139, Potassium 4.4, BUN 44 H, Creatinine 2.22 H, Glucose 

166 H, Total Bilirubin 1.3 H,  H,  H, Alkaline Phosphatase 71








- Problems


(1) Respiratory failure


Current Visit: Yes   Status: Acute   


Plan: 


Patient is 71 years of age admitted with respiratory distress presumed 

respiratory failure chest x-ray very abnormal abdomen impressive cardiomegaly 

with some interstitial changes left worse than the right patient is Covid 

positive renal insufficiency troponin positive white count is also elevated 

abnormal LFTs may have a combination of Covid and bacterial pneumonia agree with

 Lasix patient is on Xarelto at home trial of p.o. Decadron 


Qualifiers: 


   Chronicity: acute   Respiratory failure complication: unspecified whether 

with hypoxia or hypercapnia   Qualified Code(s): J96.00 - Acute respiratory 

failure, unspecified whether with hypoxia or hypercapnia

## 2022-02-10 NOTE — CON
Date of Consultation:  02/10/2022



Reason For Consultation:  Elevated BUN and creatinine, fluid management, over volume.



History Of Present Illness:  This is a pleasant 71-year-old gentleman with significant past medical h
istory of CAD complicated with congestive heart failure, hypertension, hyperlipidemia, no history of 
kidney disease.  The patient came to the hospital complaining from shortness of breath, hypoxemia, fo
und to have saturation of 81.  The patient had COVID positive back in December.  Primary workup showe
d elevation in creatinine 2.2 with BUN 44 with GFR down to 29.  For that reason, we have been consult
ed.  Reviewing the record back in April 2021; creatinine 1, GFR of 73.  The patient denied taking any
 nonsteroidal.  No IV contrast.  The patient had been on Lasix outpatient.  Over the night, the patie
nt was started on diuresis.  Kidney function continued to be low.  For that reason, we have been cons
ulted.  Again, the patient on no nonsteroidal.  No recent change in medications.  No IV contrast.



Past Medical History:  Includes;

1.Hypertension.

2.Hyperlipidemia.

3.COPD, on CPAP at home.

4.CAD complicated with congestive heart failure diastolic dysfunction as by echocardiogram with pres
erved ejection fraction 55 as by 2017 echocardiogram.



Home Medications:  Include multivitamin, nitroglycerin, pantoprazole, aspirin, amiodarone, rosuvastat
in, Lasix, loratadine, and Plavix.



Past Surgical History:  Includes bilateral knee replacement, right carotid surgery, hand surgery, PTC
A, pacemaker, Watchman, aortic valve replacement.



Family History:  Positive for CAD and CVA.



Social History:  Denied smoking.  Denied drinking.  Denied drugs abuse.



Review of Systems:

Head and Neck:  No red eye.  No ear pain. 

GI:  No nausea.  No vomiting. 

:  No polyuria.  No dysuria.  No hematuria. 

Gyn:  Not applicable. 

Respiratory:  Has shortness of breath. 

Cardiovascular:  Has orthopnea and leg swelling. 

Endocrine:  No polydipsia. 

Skin:  No rash. 

Neuro:  No weakness. 

Musculoskeletal:  Generalized fatigue.  No joint pain.



Physical Examination:

Vital Signs:  When I saw the patient; blood pressure 118/78, pulse of 112, afebrile. 

Chest:  Crackles bilateral base with decreased entry on the right base. 

Heart:  S1, S2.  Regular.  Systolic murmur. 

Abdomen:  Morbidly obese.  Could not appreciate any organomegaly. 

Extremity:  Plus edema. 

Neuro:  Alert, oriented x3.  No focality.



Laboratory Data:  Chest x-ray, cardiomegaly with congestion with bilateral pleural effusions.  Sodium
 139, potassium 4.4, bicarb 17, BUN 44, creatinine 2.2, GFR 29, calcium 8.5, magnesium not done.  Tro
ponin was positive.  Albumin 3.6.  Urinalysis was negative for infection, specific gravity of 1.015. 
 WBC 23, H and H 12/38.7, platelet 190.



Current Medications:  The patient on include Plavix, cefepime, vancomycin, Xarelto, metoprolol, rosuv
astatin, Lasix 40, Zofran.



Assessment And Plan:  

1.Acute kidney injury secondary to cardiorenal, severely over volume with marginal low blood pressur
e.  I am going to start the patient on Lasix drip and we will monitor.  I am going to go ahead and se
nd for full workup and we will monitor the patient closely.  We will place the patient on fluid restr
iction.

2.Hypertension, controlled.  We will utilize blood pressure for more diuresis to establish better vo
lume control.

3.Congestive heart failure with exacerbation.  We will follow up with Cardiology.  We will try to op
timize fluid status.  Given the marginal blood pressure, we will use Lasix drip, discontinue Lasix pj
shayna.

4.Anasarca secondary to cardiorenal.  We will send for protein creatinine and TSH.  We will diurese 
the patient as above and we will follow up. 

Thank you, Dr. Johnson for allowing us to participate in the care of your patient.





STEPHEN

DD:  02/10/2022 10:56:26Voice ID:  [id number]

DT:  02/10/2022 11:46:03Report ID:  [id number]

## 2022-02-10 NOTE — ECHO
HEIGHT: 5 ft 10 in   WEIGHT: 315 lb 0 oz   DATE OF STUDY: 02/10/2022   REFER DR: Andrew Bruner

2-DIMENSIONAL: YES

     M.MODE: YES

 DOPPLER: YES

COLOR FLOW: YES



                    TDS:  YES

PORTABLE: NO

 DEFINITY:  NO

BUBBLE STUDY: NO





DIAGNOSIS:  PULMONARY EDEMA



CARDIAC HISTORY:  

CATHERIZATION:YES

SURGERY: YES

PROSTHETIC VALVEYES

PACEMAKER: YES





MEASUREMENTS (cm)

    DIASTOLIC (NORMALS)                 SYSTOLIC (NORMALS)

IVSd                 1.3 (0.6-1.2)                    LA Diam 4.4 (1.9-4.0)     LVEF       
  47%  

LVIDd               6.8 (3.5-5.7)                        LVIDs      5.2 (2.0-3.5)     %FS  
        24%

LVPWd             1.3 (0.6-1.2)

Ao Diam           3.7 (2.0-3.7)



2 DIMENSIONAL ASSESSMENT:

RIGHT ATRIUM:                   NORMAL

LEFT ATRIUM:       ENLARGED



RIGHT VENTRICLE:            NORMAL

LEFT VENTRICLE: DEPRESSED



TRICUSPID VALVE:             

MITRAL VALVE:     



PULMONIC VALVE:             

AORTIC VALVE:     



PERICARDIAL EFFUSION: NONE

AORTIC ROOT:      NORMAL





LEFT VENTRICULAR WALL MOTION:     WALL MOTION SUGGESTIVE OF CONDUCTION ABNORMALITY.



DOPPLER/COLOR FLOW:     SEE BELOW.



COMMENTS:      MILDLY DEPRESSED LEFT VENTRICULAR EJECTION FRACTION 45-50%. MILD TRICUSPID, 
MITRAL, AORTIC AND PULMONARY REGURGITATION. AORTIC VALVE BIOPROSTHESIS IN GOOD POSITION 
FUNCTIONING NORMALLY. PULMONARY HYPERTENSION WITH RIGHT VENTRICULAR SYSTOLIC PRESSURE OF 
55-60 mmHg.



TECHNOLOGIST:   NICOLETTE LOZANO

## 2022-02-10 NOTE — RAD REPORT
EXAM DESCRIPTION:  US - Abdomen Exam Limited - 2/10/2022 6:30 am

 

CLINICAL HISTORY:  evaluate liver and gallbladder

Abdominal pain

 

COMPARISON:  No comparisons

 

FINDINGS:  The gallbladder demonstrates no gallstones. No pericholecystic fluid or gallbladder wall t
hickening. The common bile duct is normal measuring 4 mm.

 

The liver demonstrates a diffuse fatty infiltration pattern.

 

IMPRESSION:  Negative gallbladder/ biliary tree findings.

 

Diffuse fatty liver.

## 2022-02-10 NOTE — EDPHYS
Physician Documentation                                                                           

 [facility]                                                                 

Name: Santino Ma                                                                               

Age: 71 yrs                                                                                       

Sex: Male                                                                                         

: 1950                                                                                   

MRN: [id number]                                                                                   

Arrival Date: 2022                                                                          

Time: 22:18                                                                                       

Account#: [id number]                                                                            

Bed 17                                                                                            

Private MD:                                                                                       

ED Physician Hever Ruff                                                                         

HPI:                                                                                              

                                                                                             

22:48 This 71 yrs old Male presents to ER via EMS with complaints of sob.                     rn  

22:48 The patient has shortness of breath at rest, with light activity. Onset: The            rn  

      symptoms/episode began/occurred 3 day(s) ago. Duration: The symptoms are continuous.        

      The patient's shortness of breath is aggravated by exertion, light activity, supine         

      position, talking. Associated signs and symptoms: Pertinent positives: productive           

      cough, Pertinent negatives: fever, hemoptysis, loss of consciousness. Severity of           

      symptoms: At their worst the symptoms were moderate in the emergency department the         

      symptoms are unchanged. It is unknown whether or not the patient has had similar            

      symptoms in the past. The patient has not recently seen a physician. Pt reports sob for     

      3 days, no fever, reports mild cough with brown sputum. No trauma. Reports hx of            

      afib/HTN/MI. Denies chest pain. Denies back or abd pain. Reports compliant with             

      medication. + increased swelling of lower extremities. .                                    

                                                                                                  

Historical:                                                                                       

- Allergies:                                                                                      

22:26 PENICILLINS;                                                                            sm5 

- Home Meds:                                                                                      

22:26 Albuterol Inhl [Active]; Amiodarone Oral [Active]; aspirin 81 mg Oral chew [Active];    sm5 

      Crestor Oral [Active]; Protonix Oral [Active];                                              

- PMHx:                                                                                           

22:26 Atrial Fib; Hyperlipidemia; Hypertension; Myocardial infarction;                        sm5 

                                                                                                  

- Immunization history:: Client reports receiving the 2nd dose of the Covid vaccine.              

- Social history:: Smoking status: unknown.                                                       

- Family history:: not pertinent.                                                                 

- Hospitalizations: : No recent hospitalization is reported.                                      

                                                                                                  

                                                                                                  

ROS:                                                                                              

22:48 Constitutional: Negative for fever, chills, and weight loss, Eyes: Negative for injury, rn  

      pain, redness, and discharge, Neck: Negative for injury, pain, and swelling,                

      Cardiovascular: negative for chest pain, + for edema Respiratory: + sob and cough           

      Abdomen/GI: Negative for abdominal pain, nausea, vomiting, diarrhea, and constipation,      

      Back: Negative for injury and pain, MS/Extremity: + swelling bilateral legs Skin:           

      Negative for injury, rash, and discoloration, Neuro: Negative for headache, numbness,       

      tingling, and seizure.                                                                      

                                                                                                  

Exam:                                                                                             

22:48 Constitutional:  Overweight male in moderate respiratory distress Head/Face:            rn  

      Normocephalic, atraumatic. Eyes:  Periorbital areas with no swelling, redness, or           

      edema. Cardiovascular:  Tachycardic, irregular Respiratory:  + moderate tachypnea, with     

      abd breathing and retractions Abdomen/GI:  soft, non-tender Skin:  Warm, dry, no            

      cyanosis MS/ Extremity:  Pulses equal, no cyanosis. 2+ pitting edema bilateral lower        

      ext Neuro:  Awake and alert, GCS 15                                                         

                                                                                                  

Vital Signs:                                                                                      

22:23  / 116; Pulse 116; Resp 40; Pulse Ox 95% on 15% Non-rebreather mask; Weight       [facility] 

      142.88 kg; Height 5 ft. 10 in. (177.80 cm);                                                 

22:47  / 83; Pulse 115; Resp 35; Pulse Ox 94% ;                                         vc1 

02/10                                                                                             

00:02  / 70; Pulse 112; Resp 29; Pulse Ox 94% on R/A;                                   vc1 

00:30  / 80; Pulse 109; Resp 30; Pulse Ox 94% ;                                         vc1 

01:36  / 82; Pulse 107; Resp 28; Pulse Ox 95% ;                                         vc1 

02:27  / 61; Pulse 105; Resp 30; Pulse Ox 97% on BiPAP;                                 vc1 

                                                                                             

22:23 Body Mass Index 45.20 (142.88 kg, 177.80 cm)                                            [facility] 

                                                                                                  

MDM:                                                                                              

                                                                                             

22:22 Patient medically screened.                                                             rn  

23:44 ED course: Pt markedly improved on BIPAP, /89,  and paced. .                rn  

02/10                                                                                             

00:42 ED course: solumedrol IV given by EMS prior to arrival.. ED course: Pt COVID +, but     rn  

      unsure if false positive. Patient had symptoms and + COVID test 1.5 months ago at           

      [facility]. Symptoms markedly improved with aggressive treatment for CHF/pulmonary edema.        

      Will admit given respiratory distress and hypoxia..                                         

                                                                                                  

                                                                                             

22:23 Order name: Basic Metabolic Panel                                                       rn  

                                                                                             

22:23 Order name: CBC with Diff                                                               rn  

22: Order name: LFT's                                                                       rn  

                                                                                             

22:23 Order name: NT PRO-BNP                                                                  rn  

                                                                                             

22:23 Order name: PT-INR; Complete Time: 23:45                                                rn  

                                                                                             

22:23 Order name: Troponin HS                                                                 rn  

                                                                                             

22:23 Order name: XRAY Chest (1 view)                                                         rn  

                                                                                             

22:23 Order name: SARS-COV-2 RT PCR (Document "Date of Onset" if Symptomatic); Complete Time: rn  

      00:42                                                                                       

                                                                                             

22:23 Order name: BIPAP                                                                       rn  

                                                                                             

22:23 Order name: Basic Metabolic Panel                                                       EDMS

02/10                                                                                             

01:27 Order name: Lactate                                                                     EDMS

02/10                                                                                             

01:27 Order name: Procalcitonin                                                               EDMS

02/10                                                                                             

01:32 Order name: Manual Differential                                                         EDMS

                                                                                             

22:23 Order name: EKG; Complete Time: 22:24                                                   rn  

                                                                                             

22:23 Order name: Cardiac monitoring; Complete Time: 22:34                                    rn  

                                                                                             

22:23 Order name: EKG - Nurse/Tech; Complete Time: 22:34                                      rn  

                                                                                             

22:23 Order name: IV Saline Lock; Complete Time: 22:34                                        rn  

                                                                                             

22:23 Order name: Labs collected and sent; Complete Time: 22:35                               rn  

                                                                                             

22:23 Order name: O2 Per Protocol; Complete Time: 22:35                                       rn  

                                                                                             

22:23 Order name: O2 Sat Monitoring; Complete Time: 22:35                                     rn  

02/10                                                                                             

02:14 Order name: CONS Physician Consult                                                      EDMS

                                                                                                  

Administered Medications:                                                                         

                                                                                             

22:44 Drug: Nitro-Bid (nitroglycerin) Ointment 2 % 1 inches Route: Transdermal; Site:         vc1 

      anterior chest wall;                                                                        

22:44 Drug: Lasix (furosemide) 60 mg Route: IVP; Site: right antecubital;                     vc1 

23:51 Follow up: Response: No adverse reaction; Blood pressure is lowered                     vc1 

                                                                                                  

                                                                                                  

Disposition:                                                                                      

02/10                                                                                             

00:42 Critical Care:.                                                                         rn  

                                                                                                  

Disposition Summary:                                                                              

02/10/22 00:45                                                                                    

Hospitalization Ordered                                                                           

      Hospitalization Status: Inpatient Admission                                             rn  

      Provider: Inessa Johnson                                                                rn  

      Location: Tele[facility] (Inpatient)                                                 rn  

      Condition: Stable                                                                       rn  

      Problem: new                                                                            rn  

      Symptoms: have improved                                                                 rn  

      Bed/Room Type: Standard                                                                 rn  

      Room Assignment: 413(02/10/22 02:19)                                                    cg  

      Diagnosis                                                                                   

        - Unspecified combined systolic (congestive) and diastolic (congestive) heart failure rn  

        - Acute pulmonary edema                                                               rn  

        - Hypoxemia                                                                           rn  

        - SARS-associated coronavirus as the cause of diseases classified elsewhere           rn  

      Forms:                                                                                      

        - Medication Reconciliation Form                                                      rn  

        - SBAR form                                                                           rn  

Critical care time excluding procedures:                                                          

00:42 Critical care time: Bedside Care: 35 minutes, Family Intervention: 5 minutes. Total     rn  

      time: 40 minutes                                                                            

                                                                                                  

Signatures:                                                                                       

Dispatcher [employer]                           EDHever Dodd MD MD rn Garcia, Cindy RN                       Juli Pacheco RN                        RN   5                                                  

Trinh Pink RN                    RN   vc1                                                  

                                                                                                  

Corrections: (The following items were deleted from the chart)                                    

02:19 00:45 rn                                                                                cg  

                                                                                                  

**************************************************************************************************

## 2022-02-10 NOTE — ER
Nurse's Notes                                                                                     

 [facility] [place] [facility]\""                                                                 

Name: Santino Ma                                                                               

Age: 71 yrs                                                                                       

Sex: Male                                                                                         

: 1950                                                                                   

MRN: [id number]                                                                                   

Arrival Date: 2022                                                                          

Time: 22:18                                                                                       

Account#: [id number]                                                                            

Bed 17                                                                                            

Private MD:                                                                                       

Diagnosis: Unspecified combined systolic (congestive) and diastolic (congestive) heart            

  failure;Acute pulmonary edema;Hypoxemia;SARS-associated coronavirus as the cause of             

  diseases classified elsewhere                                                                   

                                                                                                  

Presentation:                                                                                     

                                                                                             

22:23 Chief complaint: Patient states: shortness of breath the past few days, 81% on RA for   5 

      EMS, solumedrol given by ems. Coronavirus screen: Vaccine status: Patient reports           

      receiving the 2nd dose of the covid vaccine. Ebola Screen: No symptoms or risks             

      identified at this time. Initial Sepsis Screen: Does the patient meet any 2 criteria?       

      RR > 20 per min. HR > 90 bpm. Does the patient have a suspected source of infection?        

      No. Patient's initial sepsis screen is negative. Risk Assessment: Do you want to hurt       

      yourself or someone else? Patient reports no desire to harm self or others. Onset of        

      symptoms Onset of symptoms was 2022.                                           

22:23 Method Of Arrival: EMS: [place] [id number] 

22:23 Acuity: YASHIRA 2                                                                           5 

                                                                                                  

Triage Assessment:                                                                                

22:26 General: Appears distressed, obese, Behavior is cooperative. Neuro: Level of            [facility] 

      Consciousness is awake, alert, obeys commands, Oriented to person, place, time,             

      situation. Cardiovascular: Capillary refill < 3 seconds Patient's skin is warm and dry.     

      Rhythm is atrial fibrillation with rapid ventricular response. Respiratory: Reports         

      shortness of breath labored breathing Airway is patent Trachea midline Respiratory          

      effort is labored.                                                                          

22:48 Pain: Denies pain.                                                                      vc1 

                                                                                                  

Historical:                                                                                       

- Allergies:                                                                                      

22:26 PENICILLINS;                                                                            sm5 

- Home Meds:                                                                                      

22:26 Albuterol Inhl [Active]; Amiodarone Oral [Active]; aspirin 81 mg Oral chew [Active];    sm5 

      Crestor Oral [Active]; Protonix Oral [Active];                                              

- PMHx:                                                                                           

22:26 Atrial Fib; Hyperlipidemia; Hypertension; Myocardial infarction;                        sm5 

                                                                                                  

- Immunization history:: Client reports receiving the 2nd dose of the Covid vaccine.              

- Social history:: Smoking status: unknown.                                                       

- Family history:: not pertinent.                                                                 

- Hospitalizations: : No recent hospitalization is reported.                                      

                                                                                                  

                                                                                                  

Screenin:47 Abuse screen: Denies threats or abuse. Nutritional screening: No deficits noted.        vc1 

      Tuberculosis screening: No symptoms or risk factors identified. Fall Risk No fall in        

      past 12 months (0 pts). Secondary diagnosis (15 points) IV access (20 points).              

      Ambulatory Aid- None/Bed Rest/Nurse Assist (0 pts). Gait- Normal/Bed Rest/Wheelchair (0     

      pts) Mental Status- Overestimates/Forgets Limitations (15 pts.). Total Kerns Fall Scale     

      indicates High Risk Score (45 or more points). Fall prevention measures have been           

      instituted. Side Rails Up X 2.                                                              

                                                                                                  

Assessment:                                                                                       

22:30 General: Appears distressed, uncomfortable, obese, Behavior is anxious. Pain: Denies    vc1 

      pain. Neuro: No deficits noted. Cardiovascular: Reports diaphoresis, shortness of           

      breath, Respiratory: Reports shortness of breath labored breathing Airway is patent         

      Trachea midline Respiratory effort is labored, Respiratory pattern is symmetrical,          

      hyperventilation tachypnea Patient placed on BiPAP:.                                        

23:52 Reassessment: Patient and/or family updated on plan of care and expected duration. Pain vc1 

      level reassessed. Patient states symptoms have improved.                                    

02/10                                                                                             

00:30 Reassessment: Patient and/or family updated on plan of care and expected duration. Pain vc1 

      level reassessed. Patient denies pain at this time. Patient states symptoms have            

      improved.                                                                                   

01:35 Reassessment: Patient and/or family updated on plan of care and expected duration. Pain vc1 

      level reassessed. Patient denies pain at this time. Patient states feeling better.          

      Patient states symptoms have improved.                                                      

                                                                                                  

Vital Signs:                                                                                      

                                                                                             

22:23  / 116; Pulse 116; Resp 40; Pulse Ox 95% on 15% Non-rebreather mask; Weight       sm5 

      142.88 kg; Height 5 ft. 10 in. (177.80 cm);                                                 

22:47  / 83; Pulse 115; Resp 35; Pulse Ox 94% ;                                         vc1 

02/10                                                                                             

00:02  / 70; Pulse 112; Resp 29; Pulse Ox 94% on R/A;                                   vc1 

00:30  / 80; Pulse 109; Resp 30; Pulse Ox 94% ;                                         vc1 

01:36  / 82; Pulse 107; Resp 28; Pulse Ox 95% ;                                         vc1 

02:27  / 61; Pulse 105; Resp 30; Pulse Ox 97% on BiPAP;                                 vc1 

02                                                                                             

22:23 Body Mass Index 45.20 (142.88 kg, 177.80 cm)                                            [facility] 

                                                                                                  

ED Course:                                                                                        

                                                                                             

22:18 Patient arrived in ED.                                                                  mw2 

22:22 Hever Ruff MD is Attending Physician.                                                rn  

22:26 Triage completed.                                                                       [facility] 

22:27 Arm band placed on right wrist. EKG completed in triage. Results shown to MD.           [facility] 

22:34 Trinh Pink, RN is Primary Nurse.                                                  vc1 

22:34 Basic Metabolic Panel Sent.                                                             vc1 

22:34 Basic Metabolic Panel Sent.                                                             vc1 

22:35 CBC with Diff Sent.                                                                     vc1 

22:35 LFT's Sent.                                                                             vc1 

22:35 NT PRO-BNP Sent.                                                                        vc1 

22:35 PT-INR Sent.                                                                            vc1 

22:35 Troponin HS Sent.                                                                       vc1 

22:44 BIPAP Sent.                                                                             vc1 

22:45 XRAY Chest (1 view) Sent.                                                               vc1 

22:48 XRAY Chest (1 view) In Process Unspecified.                                             EDMS

22:48 Patient has correct armband on for positive identification. Cardiac monitor on. Pulse   vc1 

      ox on. NIBP on.                                                                             

23:40 SARS-COV-2 RT PCR (Document "Date of Onset" if Symptomatic) Sent.                       vc1 

02/10                                                                                             

00:44 Inessa Johnson MD is Hospitalizing Provider.                                           rn  

02:29 Manual Differential Sent.                                                               vc1 

02:29 Procalcitonin Sent.                                                                     vc1 

02:29 Lactate Sent.                                                                           vc1 

03:12 No provider procedures requiring assistance completed. Patient admitted, IV remains in  vc1 

      place.                                                                                      

                                                                                                  

Administered Medications:                                                                         

                                                                                             

22:44 Drug: Nitro-Bid (nitroglycerin) Ointment 2 % 1 inches Route: Transdermal; Site:         vc1 

      anterior chest wall;                                                                        

22:44 Drug: Lasix (furosemide) 60 mg Route: IVP; Site: right antecubital;                     vc1 

23:51 Follow up: Response: No adverse reaction; Blood pressure is lowered                     vc1 

                                                                                                  

                                                                                                  

Outcome:                                                                                          

02/10                                                                                             

00:45 Decision to Hospitalize by Provider.                                                    rn  

03:12 Admitted to Tele accompanied by nurse, via stretcher, room 413, with oxygen, with       vc1 

      chart, Report called to  Krista Augustine RN                                                  

03:12 Condition: good                                                                             

03:12 Instructed on the need for admit.                                                           

03:13 Patient left the ED.                                                                    vc1 

                                                                                                  

Signatures:                                                                                       

Dispatcher [employer]                           EDHever Dodd MD MD rn [place], MyKena                            [facility]                                                  

Juli Pittman RN                        RN   5                                                  

Trinh Pink RN                    RN   vc1                                                  

                                                                                                  

**************************************************************************************************

## 2022-02-11 LAB
ALBUMIN SERPL BCP-MCNC: 3.4 G/DL (ref 3.4–5)
ALP SERPL-CCNC: 78 U/L (ref 45–117)
ALT SERPL W P-5'-P-CCNC: 416 U/L (ref 12–78)
AST SERPL W P-5'-P-CCNC: 266 U/L (ref 15–37)
BUN BLD-MCNC: 52 MG/DL (ref 7–18)
GLUCOSE SERPLBLD-MCNC: 148 MG/DL (ref 74–106)
HCT VFR BLD CALC: 33.4 % (ref 39.6–49)
HDLC SERPL-MCNC: 39 MG/DL (ref 40–60)
LDLC SERPL CALC-MCNC: 50 MG/DL (ref ?–130)
LYMPHOCYTES # SPEC AUTO: 0.6 K/UL (ref 0.7–4.9)
MAGNESIUM SERPL-MCNC: 2.1 MG/DL
PMV BLD: 10.3 FL (ref 7.6–11.3)
POTASSIUM SERPL-SCNC: 3.9 MMOL/L (ref 3.5–5.1)
RBC # BLD: 3.83 M/UL (ref 4.33–5.43)
TSH SERPL DL<=0.05 MIU/L-ACNC: 1.71 UIU/ML (ref 0.36–3.74)
URATE SERPL-MCNC: 12.2 MG/DL (ref 3.5–7.2)

## 2022-02-11 PROCEDURE — 5A0945A ASSISTANCE WITH RESPIRATORY VENTILATION, 24-96 CONSECUTIVE HOURS, HIGH NASAL FLOW/VELOCITY: ICD-10-PCS

## 2022-02-11 RX ADMIN — DEXTROSE MONOHYDRATE SCH MLS: 50 INJECTION, SOLUTION INTRAVENOUS at 07:54

## 2022-02-11 RX ADMIN — HUMAN INSULIN SCH: 100 INJECTION, SOLUTION SUBCUTANEOUS at 07:30

## 2022-02-11 RX ADMIN — RIVAROXABAN SCH MG: 20 TABLET, FILM COATED ORAL at 17:55

## 2022-02-11 RX ADMIN — DEXTROSE MONOHYDRATE SCH MLS: 50 INJECTION, SOLUTION INTRAVENOUS at 22:06

## 2022-02-11 RX ADMIN — Medication SCH ML: at 09:00

## 2022-02-11 RX ADMIN — HUMAN INSULIN SCH: 100 INJECTION, SOLUTION SUBCUTANEOUS at 16:30

## 2022-02-11 RX ADMIN — AMIODARONE HYDROCHLORIDE SCH MG: 200 TABLET ORAL at 20:28

## 2022-02-11 RX ADMIN — DEXTROSE MONOHYDRATE SCH MLS: 50 INJECTION, SOLUTION INTRAVENOUS at 14:50

## 2022-02-11 RX ADMIN — HUMAN INSULIN SCH: 100 INJECTION, SOLUTION SUBCUTANEOUS at 21:00

## 2022-02-11 RX ADMIN — AMIODARONE HYDROCHLORIDE SCH MG: 200 TABLET ORAL at 07:52

## 2022-02-11 RX ADMIN — ROSUVASTATIN CALCIUM SCH MG: 10 TABLET, COATED ORAL at 20:27

## 2022-02-11 RX ADMIN — DEXTROSE MONOHYDRATE SCH MLS: 50 INJECTION, SOLUTION INTRAVENOUS at 03:10

## 2022-02-11 RX ADMIN — METOPROLOL TARTRATE SCH MG: 25 TABLET ORAL at 20:28

## 2022-02-11 RX ADMIN — CLOPIDOGREL BISULFATE SCH MG: 75 TABLET, FILM COATED ORAL at 07:53

## 2022-02-11 RX ADMIN — METOPROLOL TARTRATE SCH: 25 TABLET ORAL at 14:00

## 2022-02-11 RX ADMIN — SODIUM CHLORIDE SCH MLS: 9 INJECTION, SOLUTION INTRAVENOUS at 03:11

## 2022-02-11 RX ADMIN — PANTOPRAZOLE SODIUM SCH MG: 40 TABLET, DELAYED RELEASE ORAL at 06:08

## 2022-02-11 RX ADMIN — METOPROLOL TARTRATE SCH MG: 25 TABLET ORAL at 07:53

## 2022-02-11 RX ADMIN — HUMAN INSULIN SCH: 100 INJECTION, SOLUTION SUBCUTANEOUS at 11:30

## 2022-02-11 RX ADMIN — Medication SCH ML: at 21:00

## 2022-02-11 NOTE — P.PN
Subjective


Date of Service: 02/11/22


Chief Complaint: CHF exacerbation with pulmonary edema, hypoxia, ORACIO 


Subjective: No new changes





Physical Examination





- Vital Signs


Temperature: 98.3 F


Blood Pressure: 103/76


Pulse: 120


Respirations: 29


Pulse Ox (%): 96





- Physical Exam


General: Other (appears as his stated age)


HEENT: Atraumatic, Normocephalic


Neck: Supple, JVD not distended


Respiratory: Other (symmetric chest expansion)


Cardiovascular: No rubs, No murmurs


Gastrointestinal: Soft and benign, Non-distended


Musculoskeletal: No clubbing


Integumentary: No warmth


Neurological: Normal tone


Urinary: Other (no bladder distention)


External genitalia: Deferred


Rectal: Deferred





Assessment And Plan





- Plan





# ORACIO 2/2 CRS1


Cont Lasix gtt


[place] fluid intake unless serum Na drops below 130


Monitor renal panel





# Acute respi failure 2/2 acute on chronic CHF


F/u TTE


Lasix as above


Low Na/heart healthy diet


[place] fluid intake as above





# DORIS 


Per other services





# Afib


Xarelto





# Htn


BP controlled


Cont metoprolol same dose bid





# Leukocytosis


On empiric abx


F/u BCx

## 2022-02-11 NOTE — P.PN
Subjective


Date of Service: 02/11/22


Chief Complaint: Respiratory failure positive for coronavirus


Subjective: Improving (Patient is subjectively feeling better still requiring 

high concentrations of oxygen)





Review of Systems


General: Weakness


Respiratory: Shortness of Breath





Physical Examination





- Vital Signs


Temperature: 97.0 F


Blood Pressure: 110/74


Pulse: 122


Respirations: 28


Pulse Ox (%): 96





- Physical Exam


General: Oriented x3, Mild distress


Respiratory: Diminished, Crackles/rales


Cardiovascular: Edema


Gastrointestinal: Normal bowel sounds, Soft and benign





Assessment And Plan





- Current Problems (Diagnosis)


(1) Respiratory failure


Current Visit: Yes   Status: Acute   


Plan: 


71-year-old male age admitted with respiratory failure tested positive for 

coronavirus seems to be subjectively improving titrate O2 down to a sat of 90% 

renal function is also improving patient's white count is elevated blood 

cultures are negative eating and drinking can change to p.o. antibiotic not sure

why he is on vancomycin which can probably be discontinued continue with 

Decadron


Qualifiers: 


   Chronicity: acute   Respiratory failure complication: unspecified whether 

with hypoxia or hypercapnia   Qualified Code(s): J96.00 - Acute respiratory 

failure, unspecified whether with hypoxia or hypercapnia

## 2022-02-11 NOTE — RAD REPORT
EXAM DESCRIPTION:  CT - Thorax Wo Con

 

CLINICAL HISTORY:  Chest pain

SOB

 

COMPARISON:  THORAX W CONTRAST dated 6/17/2014; Chest Single View dated 2/9/2022

 

FINDINGS:  Moderate bilateral ground-glass opacities, greatest in the left lung and left lower lobe m
ay represent pulmonary edema or viral infection. Small right pleural effusion is noted. No pneumothor
ax. Moderate hiatal hernia. The heart is enlarged.

 

Multiple prominent lymph nodes are present in the mediastinum including the pretracheal space measuri
ng 22 mm, AP window measuring 15 mm in sub- carinal location measuring 21 mm.

 

No concerning bony finding.

 

All CT scans are performed using dose optimization technique as appropriate and may include automated
 exposure control or mA/KV adjustment according to patient size.

 

IMPRESSION:  Moderate CHF versus volume overload pattern is suspected.A viral infection such as COVID
 infection can have a similar appearance and should be clinically correlated.

## 2022-02-12 LAB
ALBUMIN SERPL BCP-MCNC: 3.5 G/DL (ref 3.4–5)
ALBUMIN SERPL BCP-MCNC: 3.5 G/DL (ref 3.4–5)
ALP SERPL-CCNC: 114 U/L (ref 45–117)
ALT SERPL W P-5'-P-CCNC: 429 U/L (ref 12–78)
AST SERPL W P-5'-P-CCNC: 208 U/L (ref 15–37)
BUN BLD-MCNC: 58 MG/DL (ref 7–18)
BUN BLD-MCNC: 59 MG/DL (ref 7–18)
GLUCOSE SERPLBLD-MCNC: 145 MG/DL (ref 74–106)
GLUCOSE SERPLBLD-MCNC: 148 MG/DL (ref 74–106)
HCT VFR BLD CALC: 34.5 % (ref 39.6–49)
LYMPHOCYTES # SPEC AUTO: 0.6 K/UL (ref 0.7–4.9)
PMV BLD: 9.5 FL (ref 7.6–11.3)
POTASSIUM SERPL-SCNC: 3.9 MMOL/L (ref 3.5–5.1)
POTASSIUM SERPL-SCNC: 3.9 MMOL/L (ref 3.5–5.1)
RBC # BLD: 3.95 M/UL (ref 4.33–5.43)
TSH SERPL DL<=0.05 MIU/L-ACNC: 1.05 UIU/ML (ref 0.36–3.74)

## 2022-02-12 RX ADMIN — DEXTROSE MONOHYDRATE SCH MLS: 50 INJECTION, SOLUTION INTRAVENOUS at 04:54

## 2022-02-12 RX ADMIN — RIVAROXABAN SCH MG: 20 TABLET, FILM COATED ORAL at 17:33

## 2022-02-12 RX ADMIN — HUMAN INSULIN SCH: 100 INJECTION, SOLUTION SUBCUTANEOUS at 21:00

## 2022-02-12 RX ADMIN — DEXTROSE MONOHYDRATE SCH: 50 INJECTION, SOLUTION INTRAVENOUS at 03:00

## 2022-02-12 RX ADMIN — AMIODARONE HYDROCHLORIDE SCH MG: 200 TABLET ORAL at 21:37

## 2022-02-12 RX ADMIN — Medication SCH ML: at 09:00

## 2022-02-12 RX ADMIN — HUMAN INSULIN SCH: 100 INJECTION, SOLUTION SUBCUTANEOUS at 16:30

## 2022-02-12 RX ADMIN — CLOPIDOGREL BISULFATE SCH MG: 75 TABLET, FILM COATED ORAL at 08:55

## 2022-02-12 RX ADMIN — DEXTROSE MONOHYDRATE SCH MLS: 50 INJECTION, SOLUTION INTRAVENOUS at 21:37

## 2022-02-12 RX ADMIN — METOPROLOL TARTRATE SCH MG: 25 TABLET ORAL at 21:37

## 2022-02-12 RX ADMIN — DEXTROSE MONOHYDRATE SCH MLS: 50 INJECTION, SOLUTION INTRAVENOUS at 15:59

## 2022-02-12 RX ADMIN — DEXTROSE MONOHYDRATE SCH MLS: 50 INJECTION, SOLUTION INTRAVENOUS at 09:46

## 2022-02-12 RX ADMIN — Medication SCH: at 21:00

## 2022-02-12 RX ADMIN — AMIODARONE HYDROCHLORIDE SCH MG: 200 TABLET ORAL at 08:56

## 2022-02-12 RX ADMIN — METOPROLOL TARTRATE SCH MG: 25 TABLET ORAL at 08:56

## 2022-02-12 RX ADMIN — PANTOPRAZOLE SODIUM SCH MG: 40 TABLET, DELAYED RELEASE ORAL at 05:36

## 2022-02-12 RX ADMIN — HUMAN INSULIN SCH: 100 INJECTION, SOLUTION SUBCUTANEOUS at 11:30

## 2022-02-12 RX ADMIN — HUMAN INSULIN SCH: 100 INJECTION, SOLUTION SUBCUTANEOUS at 07:30

## 2022-02-12 RX ADMIN — DEXTROSE MONOHYDRATE SCH: 50 INJECTION, SOLUTION INTRAVENOUS at 18:00

## 2022-02-12 NOTE — RAD REPORT
EXAM DESCRIPTION:  RAD - Chest Single View - 2/12/2022 6:53 am

 

CLINICAL HISTORY:  Pneumonia

 

COMPARISON:  Chest Single View dated 2/9/2022; Chest Pa And Lat (2 Views) dated 4/28/2021; Chest Sing
le View dated 12/7/2019; Chest Single View dated 2/2/2017

 

FINDINGS:  Lines: None.

Lungs: Mild improvement in interstitial edema.

Pleural: No significant pleural effusions or pneumothorax.

Cardiac: Cardiomegaly. Pacemaker

Bones: No acute fractures.

Other:

 

IMPRESSION:  Improved but not resolved pulmonary edema.

## 2022-02-12 NOTE — P.PN
Subjective


Date of Service: 02/12/22


Chief Complaint: CHF exacerbation with pulmonary edema, hypoxia, ORACIO 











Today 


Cr stable 


tachycardic 


LFT elevated, will stop Crestor 





Physical exam


General: AAOx3, NAD, obese


CHEST; CTAB, no wheezes or rales


HEART : tachycardia . Normal S1,2 no murmur or rub 


Abd: soft, Nt


Ext: no edema 


Skin : No rash











# ORACIO 2/2 CRS1


Cont Lasix 


[place] po fluid intake unless serum Na drops below 130


Monitor renal panel





# Acute respi failure 2/2 acute on chronic CHF


Lasix as above


Low Na/heart healthy diet


[place] po fluid intake as above





# DORIS 


Per other services





# Afib


Xarelto


digoxin, metoprolol and amiodarone


titrate metoprolol as tolerated by BP 








# Elevated LFT 


possibly due top statin and congested liver


will dc Crestor 





# Htn


BP controlled


Cont metoprolol same dose bid





# Leukocytosis


On empiric abx


F/u BCx





Physical Examination





- Vital Signs


Temperature: 97.9 F


Blood Pressure: 109/74


Pulse: 123


Respirations: 20


Pulse Ox (%): 96

## 2022-02-12 NOTE — P.PN
Subjective


Date of Service: 02/11/22


Subjective: No new changes, No C/O voiced, Improving





Review of Systems


10-point ROS is otherwise unremarkable





Physical Examination





- Vital Signs


Temperature: 97.4 F


Blood Pressure: 115/60


Pulse: 111


Respirations: 17


Pulse Ox (%): 97





- Physical Exam


General: Alert, In no apparent distress


HEENT: Atraumatic, PERRLA, EOMI


Neck: Supple, JVD not distended


Respiratory: Clear to auscultation bilaterally, Normal air movement


Cardiovascular: Regular rate/rhythm, Normal S1 S2


Gastrointestinal: Normal bowel sounds, No tenderness


Musculoskeletal: No tenderness


Integumentary: No rashes


Neurological: Normal speech, Normal tone, Normal affect


Lymphatics: No axilla or inguinal lymphadenopathy





- Studies


Medications List Reviewed: Yes





Assessment & Plan





- Problems (Diagnosis)


(1) ORACIO (acute kidney injury)


Current Visit: Yes   Status: Acute   





(2) Acute respiratory distress


Current Visit: Yes   Status: Acute   





(3) Pulmonary edema


Current Visit: Yes   Status: Acute   


Qualifiers: 


   Chronicity: acute   Qualified Code(s): J81.0 - Acute pulmonary edema   





(4) Acute MI


Onset Date: 12/21/16   Current Visit: No   Status: Acute   





(5) Atrial fibrillation with RVR


Current Visit: No   Status: Acute   





(6) Chronic diastolic heart failure


Current Visit: No   Status: Acute   





(7) Obstructive apnea


Current Visit: No   Status: Acute   





- Plan





1. Echocardiogram if it has not been performed in the last 6 months


2. We will start patient on an ACE inhibitor or an ARB


3. We will start patient on a Beta blocker


4. Cardiology consultation


5. Aggressive diuresis


6. Strict I's and O's


7. Repeat CXR


8. Daily weights


9. Education regarding diet and treatment of congestive heart failure





- Advance Directives


Does patient have a Living Will: No


Does patient have a Durable POA for Healthcare: No

## 2022-02-12 NOTE — P.PN
Date of Service: 02/12/22





 Subjective 


Subjective: 


Patient is feeling better.  Respiratory status is improving.  Patient has been 

diuresed effectively.  Rate is still poorly controlled.  Continue with cardiac 

medications. Patient is want to try to get home.  Patient will need to be 

arrange for home oxygen as well.





 Review of Systems 


10-point ROS is otherwise unremarkable





 Physical Examination 





- Vital Signs


reviewed





- Physical Exam


General: Alert, In no apparent distress


Respiratory: Clear to auscultation bilaterally, Normal air movement


Cardiovascular: Regular rate/rhythm, Normal S1 S2


Gastrointestinal: Normal bowel sounds, No tenderness


Neurological: Normal speech, Normal tone, Normal affect








 Assessment & Plan 





- Problems (Diagnosis)


(1) ORACIO (acute kidney injury)


Current Visit: Yes   Status: Acute   





(2) Acute respiratory distress


Current Visit: Yes   Status: Acute   





(3) Pulmonary edema


Current Visit: Yes   Status: Acute   


Qualifiers: 


   Chronicity: acute   Qualified Code(s): J81.0 - Acute pulmonary edema   





(4) Acute MI


Onset Date: 12/21/16   Current Visit: No   Status: Acute   





(5) Atrial fibrillation with RVR


Current Visit: No   Status: Acute   





(6) Chronic diastolic heart failure


Current Visit: No   Status: Acute   





(7) Obstructive apnea


Current Visit: No   Status: Acute   





- Plan


Continue with POC as mentioned below:


1. Echocardiogram with EF of 50%


2. Continue diuresing


3. Continue with nebs


4. Cardiology consultation and pulmonary consultation along with Nephrology 

consultation


5. Continue rate control with amiodarone


6. Strict I's and O's


7. Repeat CXR


8. Daily weights


9. Education regarding diet and treatment of congestive heart failure





- Advance Directives


Does patient have a Living Will: No


Does patient have a Durable POA for Healthcare: No

## 2022-02-13 LAB
ALBUMIN SERPL BCP-MCNC: 3.3 G/DL (ref 3.4–5)
ALBUMIN SERPL BCP-MCNC: 3.4 G/DL (ref 3.4–5)
ALP SERPL-CCNC: 105 U/L (ref 45–117)
ALT SERPL W P-5'-P-CCNC: 397 U/L (ref 12–78)
AST SERPL W P-5'-P-CCNC: 138 U/L (ref 15–37)
BUN BLD-MCNC: 58 MG/DL (ref 7–18)
BUN BLD-MCNC: 60 MG/DL (ref 7–18)
GLUCOSE SERPLBLD-MCNC: 149 MG/DL (ref 74–106)
GLUCOSE SERPLBLD-MCNC: 151 MG/DL (ref 74–106)
HCT VFR BLD CALC: 37.3 % (ref 39.6–49)
LYMPHOCYTES # SPEC AUTO: 0.6 K/UL (ref 0.7–4.9)
MAGNESIUM SERPL-MCNC: 2.8 MG/DL (ref 1.8–2.4)
PMV BLD: 10.2 FL (ref 7.6–11.3)
POTASSIUM SERPL-SCNC: 3.5 MMOL/L (ref 3.5–5.1)
POTASSIUM SERPL-SCNC: 3.5 MMOL/L (ref 3.5–5.1)
RBC # BLD: 4.24 M/UL (ref 4.33–5.43)
TSH SERPL DL<=0.05 MIU/L-ACNC: 0.72 UIU/ML (ref 0.36–3.74)

## 2022-02-13 RX ADMIN — HUMAN INSULIN SCH: 100 INJECTION, SOLUTION SUBCUTANEOUS at 07:30

## 2022-02-13 RX ADMIN — HUMAN INSULIN SCH: 100 INJECTION, SOLUTION SUBCUTANEOUS at 11:30

## 2022-02-13 RX ADMIN — HUMAN INSULIN SCH: 100 INJECTION, SOLUTION SUBCUTANEOUS at 16:30

## 2022-02-13 RX ADMIN — CLOPIDOGREL BISULFATE SCH MG: 75 TABLET, FILM COATED ORAL at 08:03

## 2022-02-13 RX ADMIN — DEXTROSE MONOHYDRATE SCH MLS: 50 INJECTION, SOLUTION INTRAVENOUS at 08:14

## 2022-02-13 RX ADMIN — Medication SCH ML: at 20:58

## 2022-02-13 RX ADMIN — RIVAROXABAN SCH MG: 20 TABLET, FILM COATED ORAL at 16:54

## 2022-02-13 RX ADMIN — METOPROLOL TARTRATE SCH MG: 25 TABLET ORAL at 20:57

## 2022-02-13 RX ADMIN — AMIODARONE HYDROCHLORIDE SCH MG: 200 TABLET ORAL at 08:03

## 2022-02-13 RX ADMIN — METOPROLOL TARTRATE SCH MG: 25 TABLET ORAL at 08:06

## 2022-02-13 RX ADMIN — ALBUMIN (HUMAN) SCH MG: 5 SOLUTION INTRAVENOUS at 20:57

## 2022-02-13 RX ADMIN — HUMAN INSULIN SCH: 100 INJECTION, SOLUTION SUBCUTANEOUS at 20:58

## 2022-02-13 RX ADMIN — Medication SCH ML: at 08:03

## 2022-02-13 RX ADMIN — DEXTROSE MONOHYDRATE SCH MLS: 50 INJECTION, SOLUTION INTRAVENOUS at 03:21

## 2022-02-13 RX ADMIN — PANTOPRAZOLE SODIUM SCH MG: 40 TABLET, DELAYED RELEASE ORAL at 06:27

## 2022-02-13 RX ADMIN — AMIODARONE HYDROCHLORIDE SCH MG: 200 TABLET ORAL at 20:57

## 2022-02-13 NOTE — P.PN
Subjective


Date of Service: 02/13/22


Chief Complaint: CHF exacerbation with pulmonary edema, hypoxia, ORACIO 











Today 


Cr down to 1.4 


still tachycardic 


less oxygen requirement 


will strop lasix drip and start on 40mg IVP bid 














Physical exam


General: AAOx3, NAD, obese


CHEST; CTAB, no wheezes or rales


HEART : tachycardia . Normal S1,2 no murmur or rub 


Abd: soft, Nt


Ext: no edema 


Skin : No rash











# ORACIO 2/2 CRS1


Cont Lasix 


[place] po fluid intake unless serum Na drops below 130


Monitor renal panel





# Acute respi failure 2/2 acute on chronic CHF


will stop Lasix drip and start IVP 


Low Na/heart healthy diet


[place] po fluid intake as above





# DORIS 


Per other services





# Afib


Xarelto


digoxin, metoprolol and amiodarone


titrate metoprolol as tolerated by BP 








# Elevated LFT 


possibly due top statin and congested liver


of  Crestor 





# Htn


BP controlled


Cont metoprolol same dose bid





# Leukocytosis


On empiric abx


F/u BCx





Physical Examination





- Vital Signs


Temperature: 97.6 F


Blood Pressure: 114/84


Pulse: 123


Respirations: 16


Pulse Ox (%): 95





- Studies


Medications List Reviewed: Yes

## 2022-02-13 NOTE — RAD REPORT
EXAM DESCRIPTION:  RAD - Chest Single View - 2/13/2022 6:54 am

 

CLINICAL HISTORY:  Pneumonia

 

COMPARISON:  Portable February 12

 

TECHNIQUE:  AP portable chest image was obtained 2/13/2022 6:54 am .

 

FINDINGS:  No new mass or consolidation. Diffusely prominent interstitial pattern throughout both barbara
g fields has not changed. Cardiomegaly is present with upper lobe vasculature in normal range. Left s
ubclavian pacemaker remains in place. No new tube or line. No measurable pleural effusion and no pneu
mothorax. No acute bony abnormality seen. No acute aortic findings suspected.

 

IMPRESSION:  Cardiomegaly and diffusely prominent interstitial pattern stable from February 12 imagin
g.

## 2022-02-13 NOTE — P.PN
Subjective


Date of Service: 02/13/22


Chief Complaint: CHF exacerbation with pulmonary edema, hypoxia, ORACIO 


Subjective: Improving (Patient is doing much better is now down to below 4 L of 

nasal cannula oxygen)





Review of Systems


10-point ROS is otherwise unremarkable


Respiratory: Shortness of Breath





Physical Examination





- Vital Signs


Temperature: 97.6 F


Blood Pressure: 114/84


Pulse: 123


Respirations: 16


Pulse Ox (%): 95





- Physical Exam


General: Alert, Oriented x3


Respiratory: Diminished


Cardiovascular: No edema, Irregular heart rate/rhythm





- Studies


Medications List Reviewed: Yes





Assessment And Plan





- Current Problems (Diagnosis)


(1) Pneumonia due to coronavirus disease 2019


Current Visit: Yes   Status: Acute   


Plan: 


Patient is doing well improving now down to 4 L of nasal cannula oxygen renal 

function has improved no evidence of sepsis DC levofloxacin continue with set up

with home O2 possible discharge in 1 to 2 days continue with dexamethasone at 

home patient has secondary pulmonary hypertension with mild decrease in ejection

fraction

## 2022-02-14 VITALS — TEMPERATURE: 98.2 F | SYSTOLIC BLOOD PRESSURE: 101 MMHG | DIASTOLIC BLOOD PRESSURE: 82 MMHG

## 2022-02-14 VITALS — OXYGEN SATURATION: 98 %

## 2022-02-14 LAB
ALBUMIN SERPL BCP-MCNC: 3.2 G/DL (ref 3.4–5)
BUN BLD-MCNC: 64 MG/DL (ref 7–18)
GLUCOSE SERPLBLD-MCNC: 166 MG/DL (ref 74–106)
HCT VFR BLD CALC: 38.4 % (ref 39.6–49)
HCV RNA SPEC NAA+PROBE-LOG#: <1.18 LOG IU/ML
LYMPHOCYTES # SPEC AUTO: 0.8 K/UL (ref 0.7–4.9)
MAGNESIUM SERPL-MCNC: 2.8 MG/DL (ref 1.8–2.4)
PMV BLD: 9.9 FL (ref 7.6–11.3)
POTASSIUM SERPL-SCNC: 3.7 MMOL/L (ref 3.5–5.1)
RBC # BLD: 4.41 M/UL (ref 4.33–5.43)

## 2022-02-14 RX ADMIN — Medication SCH ML: at 08:29

## 2022-02-14 RX ADMIN — HUMAN INSULIN SCH: 100 INJECTION, SOLUTION SUBCUTANEOUS at 07:30

## 2022-02-14 RX ADMIN — ALBUMIN (HUMAN) SCH MG: 5 SOLUTION INTRAVENOUS at 08:29

## 2022-02-14 RX ADMIN — CLOPIDOGREL BISULFATE SCH MG: 75 TABLET, FILM COATED ORAL at 08:28

## 2022-02-14 RX ADMIN — PANTOPRAZOLE SODIUM SCH MG: 40 TABLET, DELAYED RELEASE ORAL at 05:11

## 2022-02-14 RX ADMIN — AMIODARONE HYDROCHLORIDE SCH MG: 200 TABLET ORAL at 08:28

## 2022-02-14 RX ADMIN — HUMAN INSULIN SCH: 100 INJECTION, SOLUTION SUBCUTANEOUS at 11:30

## 2022-02-14 NOTE — RAD REPORT
EXAM DESCRIPTION:  RAD - Chest Single View - 2/14/2022 6:14 am

 

CLINICAL HISTORY:  Pneumonia

 

COMPARISON:  Chest Single View dated 2/13/2022; Chest Single View dated 2/12/2022; Chest Single View 
dated 2/9/2022; Chest Pa And Lat (2 Views) dated 4/28/2021

 

FINDINGS:  Lines: Pacemaker.

Lungs: Edema has improved. No consolidation.

Pleural: No significant pleural effusions or pneumothorax.

Cardiac: Cardiomegaly.

Bones: No acute fractures.

Other:

 

IMPRESSION:  Improved/resolved pulmonary edema. No acute process.

## 2022-02-14 NOTE — P.DS
Admission Date: 02/10/22


Discharge Date: 02/14/22


Disposition: ROUTINE DISCHARGE


Discharge Condition: GOOD


Reason for Admission: CHF exacerbation with pulmonary edema, hypoxia, ORACIO 


Consultations: 





Cardiology


Pulmonology


Nephrology





Procedures: 





Problem list


Acute hypoxemic respiratory failure secondary to COVID-19 pneumonia, and acute 

on chronic diastolic CHF


ORACIO on CKDIII


Atrial fibrillation with RVR, chronic


Obstructive sleep apnea





Brief History of Present Illness: 


72 yo M with CHF,CAD, HTN, HLD, atrial fibrillation on chronic anticoagulation, 

DORIS on CPAP who presents with acute respiratory distress, O2 sats 81% on room 

air. He was placed on BIPAP, received IV lasix and nitropaste and improved. He 

reports for the past week he has become increasingly SOB with VELIZ. He also 

reports cough, wheezing, and lower leg edema. He saw his cardiologist on Monday 

for his atrial fibrillation and was started on metoprolol 25mg TID. COVID+, fir

st tested positive at the end of December. Patient denies fever, nausea, 

vomiting. 





Hospital Course: 


Patient was found to have COVID-19 pneumonia with some pulmonary edema as well. 

Pulmonology and nephrology were consulted.


Symptoms improved with diuresis and steroids. Patient was noted to be in atrial 

fibrillation with occasional heart rate up to 120-130s.


Cardiology was consulted and increased amiodarone and metoprolol dosing. 


Discharged to continue on amiodarone 200mg twice daily and metoprolol 50mg twice

daily.


Recommended continuing xarelto for 1 month as well. (In addition to plavix).








Vital Signs/Physical Exam: 














Temp Pulse Resp BP Pulse Ox


 


 98.2 F   107 H  20   101/82   95 


 


 02/14/22 12:00  02/14/22 12:00 02/14/22 12:00  02/14/22 12:00 02/14/22 12:00








General: Alert, In no apparent distress, Oriented x3


HEENT: Sclerae nonicteric


Respiratory: Other (Nonlabored respirations on 2 L nasal cannula)


Cardiovascular: Edema (Trace bilateral pedal edema), Irregular heart rate/rhythm

( HR: 100-110)


Gastrointestinal: Soft and benign, Non-distended, No tenderness


Musculoskeletal: No tenderness


Integumentary: No rashes, No significant lesion


Neurological: Normal speech, Normal affect


Laboratory Data at Discharge: 














WBC  12.30 K/uL (4.3-10.9)  H  02/14/22  03:05    


 


Hgb  12.2 g/dL (13.6-17.9)  L  02/14/22  03:05    


 


Hct  38.4 % (39.6-49.0)  L  02/14/22  03:05    


 


Plt Count  170 K/uL (152-406)   02/14/22  03:05    


 


PT  15.4 SECONDS (9.5-12.5)  H  02/09/22  22:29    


 


INR  1.34   02/09/22  22:29    


 


Sodium  140 mmol/L (136-145)   02/14/22  03:05    


 


Potassium  3.7 mmol/L (3.5-5.1)   02/14/22  03:05    


 


BUN  64 mg/dL (7-18)  H  02/14/22  03:05    


 


Creatinine  1.45 mg/dL (0.55-1.3)  H  02/14/22  03:05    


 


Glucose  166 mg/dL ()  H  02/14/22  03:05    


 


Uric Acid  12.2 mg/dL (3.5-7.2)  H  02/11/22  03:18    


 


Phosphorus  4.5 mg/dL (2.5-4.9)   02/14/22  03:05    


 


Magnesium  2.8 mg/dL (1.8-2.4)  H  02/14/22  03:05    


 


Total Bilirubin  1.0 mg/dL (0.2-1.0)   02/13/22  04:16    


 


AST  138 U/L (15-37)  H  02/13/22  04:16    


 


ALT  397 U/L (12-78)  H*  02/13/22  04:16    


 


Alkaline Phosphatase  105 U/L ()   02/13/22  04:16    


 


Triglycerides  89 mg/dL (<150)   02/11/22  03:18    


 


Cholesterol  107 mg/dL (<200)   02/11/22  03:18    


 


HDL Cholesterol  39 mg/dL (40-60)  L  02/11/22  03:18    


 


Cholesterol/HDL Ratio  2.74   02/11/22  03:18    








Home Medications: 








Pantoprazole [Protonix Tab*] 40 mg PO DAILYAC #30 tab 12/24/16 


Rosuvastatin [Crestor*] 40 mg PO BEDTIME 02/02/17 


Furosemide [Lasix*] 40 mg PO DAILY PRN #30 tab 02/03/17 


Clopidogrel Bisulfate [Plavix*] 75 mg PO DAILY #30 tablet 12/09/19 


Amiodarone HCl [Pacerone] 200 mg PO BID 30 Days #60 tab 02/14/22 


Metoprolol Tartrate [Lopressor*] 50 mg PO BID 30 Days #60 tab 02/14/22 


Rivaroxaban [Xarelto] 20 mg PO DAILY 30 Days #30 tab 02/14/22 


dexAMETHasone [Dexamethasone] 2 mg PO SEECOM 10 Days #15 tablet 02/14/22 





New Medications: 


dexAMETHasone [Dexamethasone] 2 mg PO SEECOM 10 Days #15 tablet


Metoprolol Tartrate [Lopressor*] 50 mg PO BID 30 Days #60 tab


Amiodarone HCl [Pacerone] 200 mg PO BID 30 Days #60 tab


Rivaroxaban [Xarelto] 20 mg PO DAILY 30 Days #30 tab


Physician Discharge Instructions: 


Patient was found to have COVID-19 pneumonia with some pulmonary edema as well.


Symptoms improved with diuresis and steroids. Patient was noted to be in atrial 

fibrillation with occasional heart rate up to 120-130s.


Cardiology was consulted and increased amiodarone and metoprolol dosing. 


Discharged to continue on amiodarone 200mg twice daily and metoprolol 50mg twice

 daily.


Recommended continuing xarelto for 1 month as well. (In addition to plavix).





Follow up with PCP in 3-5 days.


Follow up with Pulmonology - ~1-2 weeks.


Follow up with Nephrology in a few weeks.


Follow up with Cardiology in ~2 weeks.





Diet: AHA


Activity: Ad lexus


Followup: 


Doc Davison MD [ACTIVE - CAN ADMIT] - 1-2 Weeks (Call to schedule an 

appointment.)


Ronnie Kate MD [ACTIVE - CAN ADMIT] - 1-2 Weeks (Call to schedule an 

appointment.)


Ricci Giang MD [ACTIVE - CAN ADMIT] - 1-2 Weeks (Call to schedule an 

appointment)


Unknown,U [Primary Care Provider] - 1 Week (Call to schedule an appointment)


Time spent managing pt's care (in minutes): 45

## 2022-02-14 NOTE — P.PN
Date of Service: 02/13/22





 Subjective 


Subjective: 


Patient continues to improve.  Patient is sitting up at side of the bed and is 

respirations are much improved.  Lower extremity edema is much better as well.  

However, patient's heart rate is a elevated.  Cardiology has increased 

amiodarone dosage.  Patient also on metoprolol.  Continue with rate control and 

hopefully over the next 24-48 hr we can work on discharge planning.  Will Dc 

Figueroa catheter in a.m..





 Review of Systems 


10-point ROS is otherwise unremarkable





 Physical Examination 





- Vital Signs


reviewed





- Physical Exam


General: Alert, In no apparent distress


Respiratory: Clear to auscultation bilaterally, Normal air movement


Cardiovascular: Regular rate/rhythm, Normal S1 S2


Gastrointestinal: Normal bowel sounds, No tenderness


Neurological: Normal speech, Normal tone, Normal affect








 Assessment & Plan 





- Problems (Diagnosis)


(1) ORACIO (acute kidney injury)


Current Visit: Yes   Status: Acute   





(2) Acute respiratory distress


Current Visit: Yes   Status: Acute   





(3) Pulmonary edema


Current Visit: Yes   Status: Acute   


Qualifiers: 


   Chronicity: acute   Qualified Code(s): J81.0 - Acute pulmonary edema   





(4) Acute MI


Onset Date: 12/21/16   Current Visit: No   Status: Acute   





(5) Atrial fibrillation with RVR


Current Visit: No   Status: Acute   





(6) Chronic diastolic heart failure


Current Visit: No   Status: Acute   





(7) Obstructive apnea


Current Visit: No   Status: Acute   





- Plan


Continue with POC as mentioned below:


1. Echocardiogram with EF of 45-50%


2. Continue diuresing; plan for discharge planning over the next 24-48 hr


3. Continue with nebs and steroids; start tapering steroids


4. Cardiology consultation and pulmonary consultation along with Nephrology 

consultation appreciated


5. Continue rate control with amiodarone and beta-blocker therapy


6. Strict I's and O's


7. Repeat CXR


8. Daily weights


9. Education regarding diet and treatment of congestive heart failure





- Advance Directives


Does patient have a Living Will: No


Does patient have a Durable POA for Healthcare: No

## 2022-02-15 LAB
ALBUMIN SERPL ELPH-MCNC: 3.7 G/DL (ref 3.8–4.8)
PROT PATTERN SERPL ELPH-IMP: (no result)

## 2022-02-15 NOTE — PN
Date of Progress Note:  02/14/2022



Chief Complaint:  CHF exacerbation with pulmonary edema, hypoxemia, acute kidney injury.



Subjective:  The patient developed acute kidney injury, nonoliguric secondary to cardiorenal syndrome
.  Lasix was started and subsequently, Lasix drip was changed to IV Lasix.  The patient is on p.o. me
dication and tolerating treatment.  Creatinine level is down to 1.4.  The patient has underlying 
vidal kidney disease.



Review of Systems:

Denies fever or chills.



Physical Examination:

Chest:  Normal respiratory effort. 

Heart:  S1, S2. 

Abdomen:  Soft. 

Extremities:  No edema.



Impression And Plan:  

1.Acute on chronic kidney injury, cardiorenal syndrome.  Continue Lasix.  Monitor fluid balance and 
continue p.o. fluid restriction.

2.Acute respiratory failure secondary to chronic congestive heart failure with exacerbation.  The rosa reich developed acute on chronic congestive heart failure with diastolic dysfunction.  The patient is
 tolerating diuretics and renal function has improved.  Continue current treatment.

3.Atrial fibrillation.  The patient is on amiodarone, metoprolol, and digoxin.  Monitor digoxin leve
l.





EB/ROCHELLE

DD:  02/14/2022 21:41:03Voice ID:  [id number]

DT:  02/15/2022 02:27:47Report ID:  [id number]

## 2022-03-03 ENCOUNTER — HOSPITAL ENCOUNTER (INPATIENT)
Dept: HOSPITAL 97 - ER | Age: 72
LOS: 13 days | Discharge: ANOTHER HOSPITAL | DRG: 871 | End: 2022-03-16
Attending: HOSPITALIST | Admitting: INTERNAL MEDICINE
Payer: COMMERCIAL

## 2022-03-03 VITALS — BODY MASS INDEX: 38.9 KG/M2

## 2022-03-03 DIAGNOSIS — E87.5: ICD-10-CM

## 2022-03-03 DIAGNOSIS — A41.9: Primary | ICD-10-CM

## 2022-03-03 DIAGNOSIS — I11.0: ICD-10-CM

## 2022-03-03 DIAGNOSIS — Z95.0: ICD-10-CM

## 2022-03-03 DIAGNOSIS — Z87.891: ICD-10-CM

## 2022-03-03 DIAGNOSIS — R00.0: ICD-10-CM

## 2022-03-03 DIAGNOSIS — Z95.5: ICD-10-CM

## 2022-03-03 DIAGNOSIS — U09.9: ICD-10-CM

## 2022-03-03 DIAGNOSIS — I25.2: ICD-10-CM

## 2022-03-03 DIAGNOSIS — F41.9: ICD-10-CM

## 2022-03-03 DIAGNOSIS — R65.21: ICD-10-CM

## 2022-03-03 DIAGNOSIS — R57.0: ICD-10-CM

## 2022-03-03 DIAGNOSIS — Z79.01: ICD-10-CM

## 2022-03-03 DIAGNOSIS — Z95.2: ICD-10-CM

## 2022-03-03 DIAGNOSIS — E78.5: ICD-10-CM

## 2022-03-03 DIAGNOSIS — Z79.899: ICD-10-CM

## 2022-03-03 DIAGNOSIS — I25.10: ICD-10-CM

## 2022-03-03 DIAGNOSIS — I35.0: ICD-10-CM

## 2022-03-03 DIAGNOSIS — J18.9: ICD-10-CM

## 2022-03-03 DIAGNOSIS — D69.6: ICD-10-CM

## 2022-03-03 DIAGNOSIS — N17.0: ICD-10-CM

## 2022-03-03 DIAGNOSIS — Z78.1: ICD-10-CM

## 2022-03-03 DIAGNOSIS — I27.20: ICD-10-CM

## 2022-03-03 DIAGNOSIS — J96.01: ICD-10-CM

## 2022-03-03 DIAGNOSIS — Z88.0: ICD-10-CM

## 2022-03-03 DIAGNOSIS — K72.00: ICD-10-CM

## 2022-03-03 DIAGNOSIS — Z96.653: ICD-10-CM

## 2022-03-03 DIAGNOSIS — I48.19: ICD-10-CM

## 2022-03-03 DIAGNOSIS — Z86.16: ICD-10-CM

## 2022-03-03 DIAGNOSIS — K21.9: ICD-10-CM

## 2022-03-03 DIAGNOSIS — D64.9: ICD-10-CM

## 2022-03-03 DIAGNOSIS — Z79.02: ICD-10-CM

## 2022-03-03 DIAGNOSIS — E66.9: ICD-10-CM

## 2022-03-03 DIAGNOSIS — E87.2: ICD-10-CM

## 2022-03-03 DIAGNOSIS — I48.91: ICD-10-CM

## 2022-03-03 DIAGNOSIS — Z88.5: ICD-10-CM

## 2022-03-03 DIAGNOSIS — E87.1: ICD-10-CM

## 2022-03-03 DIAGNOSIS — I50.23: ICD-10-CM

## 2022-03-03 LAB
ALBUMIN SERPL BCP-MCNC: 3.3 G/DL (ref 3.4–5)
ALP SERPL-CCNC: 46 U/L (ref 45–117)
ALT SERPL W P-5'-P-CCNC: 34 U/L (ref 12–78)
AST SERPL W P-5'-P-CCNC: 22 U/L (ref 15–37)
BLD SMEAR INTERP: (no result)
BUN BLD-MCNC: 26 MG/DL (ref 7–18)
COHGB MFR BLDA: 1.8 % (ref 0–1.5)
CRP SERPL-MCNC: 52.3 MG/L (ref ?–3)
FERRITIN SERPL-MCNC: 105.5 NG/ML (ref 26–388)
GLUCOSE SERPLBLD-MCNC: 206 MG/DL (ref 74–106)
HCT VFR BLD CALC: 35.4 % (ref 39.6–49)
INR BLD: 2.88
LIPASE SERPL-CCNC: 48 U/L (ref 73–393)
LYMPHOCYTES # SPEC AUTO: 0.7 K/UL (ref 0.7–4.9)
MAGNESIUM SERPL-MCNC: 2.2 MG/DL (ref 1.8–2.4)
MORPHOLOGY BLD-IMP: (no result)
OXYHGB MFR BLDA: 89.2 % (ref 94–97)
PMV BLD: 10.2 FL (ref 7.6–11.3)
POTASSIUM SERPL-SCNC: 4.1 MMOL/L (ref 3.5–5.1)
RBC # BLD: 4.02 M/UL (ref 4.33–5.43)
SAO2 % BLDA: 92 % (ref 92–98.5)
SARS-COV-2 RNA RESP QL NAA+PROBE: NEGATIVE
TROPONIN I SERPL HS-MCNC: 49.9 PG/ML (ref ?–58.9)

## 2022-03-03 PROCEDURE — 85730 THROMBOPLASTIN TIME PARTIAL: CPT

## 2022-03-03 PROCEDURE — 74177 CT ABD & PELVIS W/CONTRAST: CPT

## 2022-03-03 PROCEDURE — 86850 RBC ANTIBODY SCREEN: CPT

## 2022-03-03 PROCEDURE — 36415 COLL VENOUS BLD VENIPUNCTURE: CPT

## 2022-03-03 PROCEDURE — 82570 ASSAY OF URINE CREATININE: CPT

## 2022-03-03 PROCEDURE — 87040 BLOOD CULTURE FOR BACTERIA: CPT

## 2022-03-03 PROCEDURE — 83615 LACTATE (LD) (LDH) ENZYME: CPT

## 2022-03-03 PROCEDURE — 85014 HEMATOCRIT: CPT

## 2022-03-03 PROCEDURE — 87340 HEPATITIS B SURFACE AG IA: CPT

## 2022-03-03 PROCEDURE — 93005 ELECTROCARDIOGRAM TRACING: CPT

## 2022-03-03 PROCEDURE — 80202 ASSAY OF VANCOMYCIN: CPT

## 2022-03-03 PROCEDURE — 76000 FLUOROSCOPY <1 HR PHYS/QHP: CPT

## 2022-03-03 PROCEDURE — 83690 ASSAY OF LIPASE: CPT

## 2022-03-03 PROCEDURE — 80053 COMPREHEN METABOLIC PANEL: CPT

## 2022-03-03 PROCEDURE — 94003 VENT MGMT INPAT SUBQ DAY: CPT

## 2022-03-03 PROCEDURE — 82533 TOTAL CORTISOL: CPT

## 2022-03-03 PROCEDURE — 87070 CULTURE OTHR SPECIMN AEROBIC: CPT

## 2022-03-03 PROCEDURE — 81015 MICROSCOPIC EXAM OF URINE: CPT

## 2022-03-03 PROCEDURE — 71275 CT ANGIOGRAPHY CHEST: CPT

## 2022-03-03 PROCEDURE — 82805 BLOOD GASES W/O2 SATURATION: CPT

## 2022-03-03 PROCEDURE — 83735 ASSAY OF MAGNESIUM: CPT

## 2022-03-03 PROCEDURE — 71045 X-RAY EXAM CHEST 1 VIEW: CPT

## 2022-03-03 PROCEDURE — 36430 TRANSFUSION BLD/BLD COMPNT: CPT

## 2022-03-03 PROCEDURE — 87086 URINE CULTURE/COLONY COUNT: CPT

## 2022-03-03 PROCEDURE — 87088 URINE BACTERIA CULTURE: CPT

## 2022-03-03 PROCEDURE — 84300 ASSAY OF URINE SODIUM: CPT

## 2022-03-03 PROCEDURE — 90935 HEMODIALYSIS ONE EVALUATION: CPT

## 2022-03-03 PROCEDURE — 82947 ASSAY GLUCOSE BLOOD QUANT: CPT

## 2022-03-03 PROCEDURE — 94002 VENT MGMT INPAT INIT DAY: CPT

## 2022-03-03 PROCEDURE — 80076 HEPATIC FUNCTION PANEL: CPT

## 2022-03-03 PROCEDURE — 84145 PROCALCITONIN (PCT): CPT

## 2022-03-03 PROCEDURE — 94660 CPAP INITIATION&MGMT: CPT

## 2022-03-03 PROCEDURE — 85610 PROTHROMBIN TIME: CPT

## 2022-03-03 PROCEDURE — 83880 ASSAY OF NATRIURETIC PEPTIDE: CPT

## 2022-03-03 PROCEDURE — 83605 ASSAY OF LACTIC ACID: CPT

## 2022-03-03 PROCEDURE — 0241U: CPT

## 2022-03-03 PROCEDURE — 85018 HEMOGLOBIN: CPT

## 2022-03-03 PROCEDURE — 84484 ASSAY OF TROPONIN QUANT: CPT

## 2022-03-03 PROCEDURE — 87205 SMEAR GRAM STAIN: CPT

## 2022-03-03 PROCEDURE — 99285 EMERGENCY DEPT VISIT HI MDM: CPT

## 2022-03-03 PROCEDURE — 83010 ASSAY OF HAPTOGLOBIN QUANT: CPT

## 2022-03-03 PROCEDURE — 74018 RADEX ABDOMEN 1 VIEW: CPT

## 2022-03-03 PROCEDURE — 82024 ASSAY OF ACTH: CPT

## 2022-03-03 PROCEDURE — 86901 BLOOD TYPING SEROLOGIC RH(D): CPT

## 2022-03-03 PROCEDURE — 82728 ASSAY OF FERRITIN: CPT

## 2022-03-03 PROCEDURE — 80048 BASIC METABOLIC PNL TOTAL CA: CPT

## 2022-03-03 PROCEDURE — 76770 US EXAM ABDO BACK WALL COMP: CPT

## 2022-03-03 PROCEDURE — 86140 C-REACTIVE PROTEIN: CPT

## 2022-03-03 PROCEDURE — 86900 BLOOD TYPING SEROLOGIC ABO: CPT

## 2022-03-03 PROCEDURE — 85025 COMPLETE CBC W/AUTO DIFF WBC: CPT

## 2022-03-03 PROCEDURE — 96375 TX/PRO/DX INJ NEW DRUG ADDON: CPT

## 2022-03-03 PROCEDURE — 96374 THER/PROPH/DIAG INJ IV PUSH: CPT

## 2022-03-03 PROCEDURE — 81003 URINALYSIS AUTO W/O SCOPE: CPT

## 2022-03-03 PROCEDURE — 85049 AUTOMATED PLATELET COUNT: CPT

## 2022-03-03 PROCEDURE — 84100 ASSAY OF PHOSPHORUS: CPT

## 2022-03-03 PROCEDURE — 87102 FUNGUS ISOLATION CULTURE: CPT

## 2022-03-03 PROCEDURE — 80069 RENAL FUNCTION PANEL: CPT

## 2022-03-03 RX ADMIN — HEPARIN SODIUM SCH UNIT: 5000 INJECTION, SOLUTION INTRAVENOUS; SUBCUTANEOUS at 18:56

## 2022-03-03 RX ADMIN — ROSUVASTATIN CALCIUM SCH MG: 10 TABLET, COATED ORAL at 21:47

## 2022-03-03 RX ADMIN — MELATONIN SCH MG: 3 TAB ORAL at 21:25

## 2022-03-03 RX ADMIN — METHYLPREDNISOLONE SODIUM SUCCINATE SCH MG: 40 INJECTION, POWDER, FOR SOLUTION INTRAMUSCULAR; INTRAVENOUS at 12:00

## 2022-03-03 RX ADMIN — AMIODARONE HYDROCHLORIDE SCH MG: 200 TABLET ORAL at 21:47

## 2022-03-03 RX ADMIN — METHYLPREDNISOLONE SODIUM SUCCINATE SCH MG: 40 INJECTION, POWDER, FOR SOLUTION INTRAMUSCULAR; INTRAVENOUS at 18:54

## 2022-03-03 RX ADMIN — LEVOFLOXACIN SCH MLS: 5 INJECTION, SOLUTION INTRAVENOUS at 10:00

## 2022-03-03 RX ADMIN — METOPROLOL TARTRATE SCH MG: 50 TABLET, FILM COATED ORAL at 21:46

## 2022-03-03 RX ADMIN — SODIUM CHLORIDE SCH MG: 0.9 INJECTION, SOLUTION INTRAVENOUS at 19:03

## 2022-03-03 NOTE — EDPHYS
Physician Documentation                                                                           

 [facility]                                                                 

Name: Santino Ma                                                                               

Age: 71 yrs                                                                                       

Sex: Male                                                                                         

: 1950                                                                                   

MRN: [id number]                                                                                   

Arrival Date: 2022                                                                          

Time: 07:33                                                                                       

Account#: [id number]                                                                            

Bed 4                                                                                             

Private MD: Gaston Ruiz B                                                                     

ED Physician Eze Elmore                                                                      

HPI:                                                                                              

                                                                                             

08:24 This 71 yrs old  Male presents to ER via Wheelchair with complaints of Low O2. [place] 

08:24 The patient has shortness of breath at rest, with light activity. Onset: The            [place] 

      symptoms/episode began/occurred 3 day(s) ago. Duration: The symptoms are continuous,        

      and are steadily getting worse. The patient's shortness of breath has no apparent           

      modifying factors. Associated signs and symptoms: The patient has no apparent               

      associated signs or symptoms. Severity of symptoms: At their worst the symptoms were        

      moderate in the emergency department the symptoms are unchanged. The patient or             

      guardian reports cough, difficulty breathing. Onset: The symptoms/episode                   

      began/occurred acutely. Severity of symptoms: At their worst the symptoms were              

      moderate, in the emergency department the symptoms are unchanged. Modifying factors:        

      The symptoms are alleviated by cool environment, steroids, the symptoms are aggravated      

      by exertion, talking. Associated signs and symptoms: Pertinent positives: rhinorrhea.       

      The patient has experienced similar episodes in the past, multiple times.                   

                                                                                                  

Historical:                                                                                       

- Allergies:                                                                                      

07:41 PENICILLINS;                                                                            ll1 

15:12 codeine;                                                                                tw2 

- Home Meds:                                                                                      

09:00 amiodarone 200 mg Oral tab 1 tab 2 times per day [Active]; furosemide 40 mg Oral tab 1  tw2 

      tab 2 times per day [Active]; pantoprazole 40 mg oral grps 1 packet 2 times per day         

      [Active]; rosuvastatin 40 mg oral cpSP 1 cap once daily [Active]; Xarelto 20 mg oral        

      tab 1 tab once daily [Active]; metoprolol tartrate 50 mg Oral tab 1 tab 2 times per day     

      [Active];                                                                                   

- PMHx:                                                                                           

07:41 Atrial Fib; Hyperlipidemia; Hypertension; Myocardial infarction;                        ll1 

- PSHx:                                                                                           

15:12 pacemaker;                                                                              tw2 

                                                                                                  

- Immunization history:: Client reports having NOT received the Covid vaccine.                    

- Social history:: Smoking status: Patient/guardian denies using tobacco, the patient             

  reports quitting approximately 15 years ago.                                                    

                                                                                                  

                                                                                                  

ROS:                                                                                              

08:26 Constitutional: Negative for fever, chills, and weight loss, Eyes: Negative for injury, [place] 

      pain, redness, and discharge, ENT: Negative for injury, pain, and discharge, Neck:          

      Negative for injury, pain, and swelling, Cardiovascular: Negative for chest pain,           

      palpitations, and edema, Abdomen/GI: Negative for abdominal pain, nausea, vomiting,         

      diarrhea, and constipation, Back: Negative for injury and pain, : Negative for            

      injury, bleeding, discharge, and swelling, MS/Extremity: Negative for injury and            

      deformity, Skin: Negative for injury, rash, and discoloration, Neuro: Negative for          

      headache, weakness, numbness, tingling, and seizure, Psych: Negative for depression,        

      anxiety, suicide ideation, homicidal ideation, and hallucinations, Allergy/Immunology:      

      Negative for hives, rash, and allergies, Endocrine: Negative for neck swelling,             

      polydipsia, polyuria, polyphagia, and marked weight changes, Hematologic/Lymphatic:         

      Negative for swollen nodes, abnormal bleeding, and unusual bruising.                        

08:26 Respiratory: Positive for cough, shortness of breath.                                       

                                                                                                  

Exam:                                                                                             

08:26 Constitutional:  This is a well developed, well nourished patient who is awake, alert,  [place] 

      and in no acute distress. Head/Face:  Normocephalic, atraumatic. Eyes:  Pupils equal        

      round and reactive to light, extra-ocular motions intact.  Lids and lashes normal.          

      Conjunctiva and sclera are non-icteric and not injected.  Cornea within normal limits.      

      Periorbital areas with no swelling, redness, or edema. ENT:  Nares patent. No nasal         

      discharge, no septal abnormalities noted.  Tympanic membranes are normal and external       

      auditory canals are clear.  Oropharynx with no redness, swelling, or masses, exudates,      

      or evidence of obstruction, uvula midline.  Mucous membranes moist. Neck:  Trachea          

      midline, no thyromegaly or masses palpated, and no cervical lymphadenopathy.  Supple,       

      full range of motion without nuchal rigidity, or vertebral point tenderness.  No            

      Meningismus. Chest/axilla:  Normal chest wall appearance and motion.  Nontender with no     

      deformity.  No lesions are appreciated. Cardiovascular:  Regular rate and rhythm with a     

      normal S1 and S2.  No gallops, murmurs, or rubs.  Normal PMI, no JVD.  No pulse             

      deficits. Abdomen/GI:  Soft, non-tender, with normal bowel sounds.  No distension or        

      tympany.  No guarding or rebound.  No evidence of tenderness throughout. Back:  No          

      spinal tenderness.  No costovertebral tenderness.  Full range of motion. Male :           

      Normal genitalia with no discharge or lesions. Skin:  Warm, dry with normal turgor.         

      Normal color with no rashes, no lesions, and no evidence of cellulitis. MS/ Extremity:      

      Pulses equal, no cyanosis.  Neurovascular intact.  Full, normal range of motion. Neuro:     

       Awake and alert, GCS 15, oriented to person, place, time, and situation.  Cranial          

      nerves II-XII grossly intact.  Motor strength 5/5 in all extremities.  Sensory grossly      

      intact.  Cerebellar exam normal.  Normal gait. Psych:  Awake, alert, with orientation       

      to person, place and time.  Behavior, mood, and affect are within normal limits.            

08:26 ECG was reviewed by the Attending Physician.                                                

08:26 Respiratory: mild respiratory distress is noted,  Respirations: normal, Breath sounds:      

      decreased breath sounds, that are moderate, are heard in the  left posterior lower          

      lobe, right posterior middle lobe and right posterior lower lobe.                           

                                                                                                  

Vital Signs:                                                                                      

07:42  / 80; Pulse 120; Resp 30; Temp 99.3; Pulse Ox 94% on 4 lpm NC; Weight 134.72 kg; ll1 

      Height 5 ft. 10 in. (177.80 cm); Pain 0/10;                                                 

08:54 BP 92 / 62; Pulse 114; Resp 27; Pulse Ox 92% on 4 lpm NC;                               tw2 

09:31  / 48; Pulse 59; Resp 20; Pulse Ox 100% on 4 lpm NC;                              tw2 

10:23  / 75; Pulse 107; Resp 30; Pulse Ox 91% on 6 lpm NC;                              tw2 

11:15  / 74; Pulse 104; Resp 29; Pulse Ox 95% on Airvo - high flow o2. 20L/min, 65%;    tw2 

12:09  / 67; Pulse 107; Resp 29; Pulse Ox 93% on Nebulizer Mask;                        tw2 

13:10  / 65; Pulse 108; Resp 24; Pulse Ox 96% ;                                         tw2 

14:12 BP 98 / 67; Pulse 116; Resp 28; Pulse Ox 96% ;                                          tw2 

15:05  / 69; Pulse 106; Resp 24; Pulse Ox 96% ;                                         tw2 

07:42 Body Mass Index 42.61 (134.72 kg, 177.80 cm)                                            ll1 

10:23 provider notified of pts RR and increased WOB. RT paged for BIPAP at this time.         tw2 

13:10 HIGH FLOW                                                                               tw2 

14:12 hiflow mask                                                                             tw2 

15:05 hiflow                                                                                  tw2 

                                                                                                  

MDM:                                                                                              

07:37 Patient medically screened.                                                             [place] 

08:28 Differential diagnosis: Anemia asthma, Bronchitis CHF exacerbation, pneumonia,          [place] 

      pulmonary edema, Pulmonary Embolism reactive airway disease, Unstable Angina.               

      Antibiotic administration: Rocephin and Zithromax given. The patient's Wells Deep Vein      

      Thrombosis Score was calculated as follows: Heart Rate >100 BPM (1.5 Pts) Total Score:      

      0-2 Pts- Low Risk. Differential Diagnosis: Obstructed Airway Bronchitis Influenza Upper     

      Respiratory Infection Sinusitis Pharyngitis Otitis Media Asthma Exacerbation Viral          

      Syndrome Pneumonia Tracheal Injury. The patient's pulmonary embolism risk score was         

      calculated as follows: the patients heart rate is greater than 100 beats per minute         

      (1.5 Pts). Immunization status: Pneumococcal vaccine: Influenza vaccine: Data reviewed:     

      vital signs, nurses notes, lab test result(s), EKG, radiologic studies, CT scan, plain      

      films. Data interpreted: Cardiac monitor: rate is 120 beats/min, rhythm is regular,         

      Pulse oximetry: on room air is 94 %. Counseling: I had a detailed discussion with the       

      patient and/or guardian regarding: the historical points, exam findings, and any            

      diagnostic results supporting the discharge/admit diagnosis, lab results, radiology         

      results, the need for further work-up and treatment in the hospital.                        

                                                                                                  

                                                                                             

07:43 Order name: Basic Metabolic Panel; Complete Time: 10:12                                 [facility] 

                                                                                             

07:43 Order name: CBC with Diff; Complete Time: 10:12                                         [facility] 

                                                                                             

07:43 Order name: LFT's; Complete Time: 10:12                                                 [facility] 

                                                                                             

07:43 Order name: Magnesium; Complete Time: 10:12                                             [facility] 

                                                                                             

07:43 Order name: NT PRO-BNP; Complete Time: 10:12                                            [facility] 

                                                                                             

07:43 Order name: PT-INR; Complete Time: 10:12                                                [facility] 

                                                                                             

07:43 Order name: Troponin HS; Complete Time: 10:12                                           [facility] 

                                                                                             

07:43 Order name: Lipase; Complete Time: 10:12                                                [facility] 

                                                                                             

07:43 Order name: Blood Culture Adult (2)                                                     [facility] 

                                                                                             

07:43 Order name: Lactate; Complete Time: 10:12                                               [facility] 

                                                                                             

07:43 Order name: COVID-19/FLU A+B/RSV (Document "Date of Onset" if Symptomatic); Complete    [place] 

      Time: 10:12                                                                                 

                                                                                             

07:43 Order name: CRP; Complete Time: 10:12                                                   [facility] 

                                                                                             

07:43 Order name: Ferritin; Complete Time: 10:12                                              [facility] 

                                                                                             

08:10 Order name: Procalcitonin                                                               [facility] 

                                                                                             

07:43 Order name: XRAY Chest (1 view); Complete Time: 10:12                                   [facility] 

                                                                                             

07:43 Order name: EKG; Complete Time: 07:44                                                   [facility] 

                                                                                             

07:43 Order name: Cardiac monitoring; Complete Time: 07:55                                    [facility] 

                                                                                             

07:43 Order name: CT Chest For PE Angio; Complete Time: 10:12                                 [facility] 

                                                                                             

08:11 Order name: Procalcitonin; Complete Time: 10:12                                         [facility]

                                                                                             

09:00 Order name: Urine Dipstick-Ancillary; Complete Time: 10:12                              [facility]

                                                                                             

09:40 Order name: CBC Smear Scan; Complete Time: 10:12                                        [facility]

                                                                                             

10:12 Order name: BIPAP                                                                       [facility] 

                                                                                             

07:43 Order name: EKG - Nurse/Tech; Complete Time: 08:26                                      [facility] 

                                                                                             

07:43 Order name: IV Saline Lock; Complete Time: 08:08                                        [facility] 

                                                                                             

07:43 Order name: Labs collected and sent; Complete Time: 08:08                               [facility] 

                                                                                             

07:43 Order name: O2 Per Protocol; Complete Time: 07:55                                       [facility] 

                                                                                             

07:43 Order name: O2 Sat Monitoring; Complete Time: 08:26                                     [facility] 

                                                                                             

07:43 Order name: Urine Dipstick-Ancillary (obtain specimen); Complete Time: 09:53            [facility] 

                                                                                             

08:14 Order name: Figueroa; Complete Time: 09:52                                                 [facility] 

                                                                                                  

EC:26 Rate is 121 beats/min. Rhythm is regular. QRS Axis is Normal. RI interval is normal.    [place] 

      QRS interval is prolonged. QT interval is normal. No Q waves. T waves are Normal. No ST     

      changes noted. Clinical impression: No evidence of ischemia. Interpreted by me.             

      Reviewed by me.                                                                             

                                                                                                  

Administered Medications:                                                                         

08:13 Not Given (Duplicate Order): NS 0.9% 1000 ml IV at 125 ml/hr continuous                 [place] 

08:15 Drug: SOLU-Medrol (methylPrednisoLONE) 125 mg Route: IVP; Site: right antecubital;      tw2 

09:53 Follow up: Response: No adverse reaction                                                tw2 

08:18 Drug: Pepcid (famotidine) 20 mg Route: IVP; Site: right antecubital;                    tw2 

09:52 Follow up: Response: No adverse reaction                                                tw2 

08:23 Not Given (Duplicate Order): Zithromax (azithromycin) 500 mg IVPB once over 1 hrs; mix  [place] 

      in 250 mL NS                                                                                

08:25 Drug: Rocephin (cefTRIAXone) 1 grams Route: IV; Rate: per protocol; Site: right         tw2 

      antecubital;                                                                                

08:30 Follow up: Response: No adverse reaction; IV Status: Completed infusion; IV Intake: 60tesb1 

09:50 Drug: Lasix (furosemide) 40 mg Route: IVP; Site: right antecubital;                     tw2 

10:47 Follow up: Response: No adverse reaction                                                tw2 

11:13 Drug: Lasix (furosemide) 20 mg Route: IVP; Site: right antecubital;                     tw2 

12:08 Follow up: Response: No adverse reaction                                                tw2 

12:15 Drug: Budesonide 0.25 mg/2 mL 0.5 mg Route: Inhalation;                                 tw2 

                                                                                                  

                                                                                                  

Disposition Summary:                                                                              

22 08:36                                                                                    

Hospitalization Ordered                                                                           

      Hospitalization Status: Inpatient Admission                                             [place] 

      Provider: Prince [place] Jaquez 

      Location: Telemetry/MedSurg (Inpatient)                                                 [place] 

      Condition: Fair                                                                         [place] 

      Problem: new                                                                            [place] 

      Symptoms: have improved                                                                 [place] 

      Bed/Room Type: Standard                                                                 [place] 

      Room Assignment: 216(22 12:34)                                                    ss  

      Diagnosis                                                                                   

        - Coronavirus infection, unspecified                                                  [place] 

        - Pneumonia due to SARS-associated coronavirus                                        [place] 

        - Hypoxemia                                                                           [place] 

        - Obesity, unspecified                                                                [place] 

        - Combined systolic (congestive) and diastolic (congestive) heart failure             [place] 

      Forms:                                                                                      

        - Medication Reconciliation Form                                                      [place] 

        - SBAR form                                                                           [place] 

Signatures:                                                                                       

Dispatcher [employer]                           EDEze Caban MD MD cha Smirch, [place], RN                      RN   ss                                                   

Shaina Hernandez RN                          RN   tw2                                                  

Janet Posey RN                       RN   ll1                                                  

                                                                                                  

Corrections: (The following items were deleted from the chart)                                    

12:34 08:36 shelby                                                                               ss  

15:15 07:41 PSHx: None; ll1                                                                   tw2 

                                                                                                  

**************************************************************************************************

## 2022-03-03 NOTE — RAD REPORT
EXAM DESCRIPTION:  RAD - Chest Single View - 3/3/2022 8:12 am

 

CLINICAL HISTORY:  COUGH

Chest pain.

 

COMPARISON:  Chest Single View dated 2/14/2022; Chest Single View dated 2/13/2022; Chest Single View 
dated 2/12/2022; Chest Single View dated 2/9/2022

 

FINDINGS:  Portable technique limits examination quality.

 

Extensive bilateral pulmonary opacities may represent pneumonia or pulmonary edema. The heart is mode
rately enlarged in size. Dual lead pacer device is present.

## 2022-03-03 NOTE — RAD REPORT
EXAM DESCRIPTION:  RAD - Chest Single View - 3/3/2022 6:22 pm

 

CLINICAL HISTORY:  hypoxia

 

COMPARISON:  Portable 03/03/2022

 

TECHNIQUE:  AP portable chest image was obtained 3/3/2022 6:22 pm .

 

FINDINGS:  Lung volume is similar to comparison. Diffuse airspace opacification throughout the left l
daniel field and to a lesser degree the right lung field again noted. There is slight worsening in the r
ight upper lobe from earlier day examination. Pacemaker in place.

 

 Stable cardiomegaly noted. Vasculature remains prominent. No pneumothorax or enlarging pleural effus
ion. No acute bony abnormality seen. No acute aortic findings suspected.

 

IMPRESSION:  Slight worsening of the diffuse pulmonary edema or pneumonia pattern from earlier in the
 day.

## 2022-03-03 NOTE — P.HP
Certification for Inpatient


Patient admitted to: Inpatient


With expected LOS: >2 Midnights


Practitioner: I am a practitioner with admitting privileges, knowledge of 

patient current condition, hospital course, and medical plan of care.


Services: Services provided to patient in accordance with Admission requirements

found in Title 42 Section 412.3 of the Code of Federal Regulations





Patient History


Date of Service: 03/03/22


Reason for admission: Shortness of breath


History of Present Illness: 





Patient is a 71-year-old  male with a known past medical history of 

CHF, atrial fibrillation status post watchman procedure, TAVR. He presents to 

the ER accompanied by wife and complaining of shortness of breath. His symptoms 

have been ongoing for a few months now. He was diagnosed with COVID-19 on 

January 1st.  He underwent outpatient work-up at the time which included a chest

x-ray, which was negative.  Patient admits that he improved significantly.  He 

was not on any particular therapy.  A month later, he developed shortness of 

breath and was admitted here for CHF exacerbation.  He also tested positive for 

COVID-19 and had evidence of infiltrate on chest x-ray.  He was treated and 

discharged mid February.  He now returns with similar symptoms of shortness of 

breath, lower extremity edema.  Patient insists that his been compliant with his

diuresis.  He takes Lasix 40 mg p.o. twice daily.  He has been told by his 

cardiologist Dr. Marco murillo that he may need dual-chamber pacemaker for cardiac

dyssynchrony.


Allergies





Penicillins Allergy (Mild, Verified 04/28/21 10:14)


   Hives/Rash





Home Medications: 








Pantoprazole [Protonix Tab*] 40 mg PO DAILYAC #30 tab 12/24/16 


Rosuvastatin [Crestor*] 40 mg PO BEDTIME 02/02/17 


Furosemide [Lasix*] 40 mg PO DAILY PRN #30 tab 02/03/17 


Clopidogrel Bisulfate [Plavix*] 75 mg PO DAILY #30 tablet 12/09/19 


Amiodarone HCl [Pacerone] 200 mg PO BID 30 Days #60 tab 02/14/22 


Metoprolol Tartrate [Lopressor*] 50 mg PO BID 30 Days #60 tab 02/14/22 


Rivaroxaban [Xarelto] 20 mg PO DAILY 30 Days #30 tab 02/14/22 


dexAMETHasone [Dexamethasone] 2 mg PO SEECOM 10 Days #15 tablet 02/14/22 








- Past Medical/Surgical History


Diabetic: No


-: HTN


-: hyperlipidemia


-: atrial fibrillation


-: GERD


-: MI


-: Obstructive sleep apnea


-: BILATERAL KNEE REPLACEMENT


-: RIGHT CAROTID SURGERY


-: BILATERAL HAND SURGERY TO RELEASE LITTLE FINGERS


-: MERLE IN LEFT FEMUR


-: cardiac stent


-: pacemaker


-: watchman 


-: aortic valve replacement 





- Family History


  ** Mother


-: Heart disease





  ** Brother


-: Heart disease, Stroke





- Social History


Alcohol use: No


CD- Drugs: No


Caffeine use: Yes





Physical Examination





- Physical Exam


General: Cooperative, Mild distress, Obese


HEENT: Atraumatic, Normocephalic


Respiratory: Other (Breathing is not laboured)


Cardiovascular: No edema, Other (tachycardic)


Neurological: Normal speech, Normal affect





- Studies


Laboratory Data (last 24 hrs)





03/03/22 08:08: PT 33.5 H, INR 2.88


03/03/22 08:08: WBC 14.00 H, Hgb 11.2 L, Hct 35.4 L, Plt Count 92 L


03/03/22 08:08: Sodium 142, Potassium 4.1, BUN 26 H, Creatinine 1.44 H, Glucose 

206 H, Magnesium 2.2  D, Total Bilirubin 1.0, AST 22, ALT 34, Alkaline 

Phosphatase 46, Lipase 48 L








Assessment and Plan





- Problems (Diagnosis)


(1) Acute hypoxemic respiratory failure due to COVID-19


Current Visit: Yes   Status: Acute   





(2) Acute exacerbation of CHF (congestive heart failure)


Current Visit: Yes   Status: Acute   





(3) Atrial fibrillation with RVR


Current Visit: No   Status: Acute   





(4) CAD (coronary artery disease)


Current Visit: No   Status: Chronic   


Qualifiers: 


   Coronary Disease-Associated Artery/Lesion type: native artery   Native vs. 

transplanted heart: native heart   Associated angina: without angina   Qualified

 Code(s): I25.10 - Atherosclerotic heart disease of native coronary artery 

without angina pectoris   





(5) Hyperlipidemia


Current Visit: No   Status: Chronic   


Qualifiers: 


   Hyperlipidemia type: unspecified   Qualified Code(s): E78.5 - Hyperlipidemia,

 unspecified   





(6) Hypertension


Current Visit: No   Status: Chronic   


Qualifiers: 


   Hypertension type: essential hypertension 





- Advance Directives


Does patient have a Living Will: No


Does patient have a Durable POA for Healthcare: No


Physician Review Additional Text: 





Assessment


Patient is a 71-year-old  male with a past medical history of 

congestive heart failure, pulmonary hypertension, atrial fibrillation status po

st watchman procedure, and TAVR.  He returned to the hospital complaining of 

shortness of breath and lower extremity edema.  He is being treated with a 

working diagnosis of Covid pneumonia with superimposed CHF exacerbation.  He was

 admitted here in mid February for a similar presentation.  He has received 40 

mg of IV Lasix in the ER and Solu-Medrol.





Acute hypoxemic respiratory failure


COVID-19 pneumonia


Acute on chronic CHF exacerbation


Wide-complex tachycardia


Acute kidney failure


Pacemaker in place





Plan: 


Admit inpatient with telemetry


Start patient on Solu-Medrol 40 mg IV every 6 hours


Start levofloxacin for empiric bacterial coverage


Multivitamins and zinc


I agree with initial dose of Lasix.  Will repeat BMP tomorrow


GI prophylaxis with pantoprazole, DVT prophylaxis with heparin subcu


Cardiology and pulmonary have been consulted


Patient will need more than 2 midnights

## 2022-03-03 NOTE — CON
Date of Consultation:  03/03/2022



Reason For Consultation:  Heart failure.



History Of Present Illness:  A 71-year-old male with history of congestive heart failure, recent COVI
D-19, has history of coronary artery disease, status post LAD PCI, status post also TAVR for severe a
ortic valve stenosis and LA appendage closure.  This patient has been having difficulties with shortn
ess of breath, paddling with it for the past 2 months.  Echo showed drop in his ejection fraction and
 dyssynchrony.  He has a pacemaker that was placed post TAVR and the RV pacing is putting him in the 
synchrony and dropping his ejection fraction, going into heart failure.  On top of that, he went into
 persistent atrial fibrillation that he is not able to tolerate, comes in with full-blown heart failu
re symptoms with orthopnea, lower extremities edema, and shortness of breath.



Past Medical History:  As outlined above in the HPI.



Medications:  Refer reconciliation sheet for detailed list.



Allergies:  REVIEWED.



Family History:  No premature coronary artery disease or cancer.



Social History:  Does not smoke or drink.  Does not use any drugs.



Review of Systems:

All systems reviewed were negative except for above mentioned in HPI.



Physical Examination:

Vital Signs:  Reviewed. 

Head and Neck:  Pupils are equal and reactive to light.  Intact eye movements.  Significant JVD eleva
tion up to his earlobes. 

Lungs:  Crackles at both lung bases and slight increased respiratory efforts. 

Heart:  Irregularly irregular.  No extra sounds. 

Abdomen:  Soft, nontender.  Bowel sounds positive. 

Extremities:  No clubbing or cyanosis.  Intact pulses.  There is 2 to 3+ pedal edema. 

Neurologic:  Alert, awake, oriented x3.  No acute focal deficits appreciated. 

Lymph Nodes:  No cervical or axillary lymphadenopathy.



Investigations:  White blood cell count is 14,000, hemoglobin 11.2, platelet count is 92.  Sodium 142
, BUN is 26, creatinine is 1.4.  His NT-proBNP is 8100.  Troponin is negative.  C-reactive protein is
 52.  Procalcitonin is less than 0.05.  CTA; extensive infiltrates bilaterally, suggestive of congest
madeline heart failure versus pneumonia.



Assessment And Recommendations:  

1.Acute hypoxic respiratory failure.  The patient has a blood pressure on the low side.  There is si
gnificant JVD elevation with orthopnea and lower extremities edema.  This is likely more of a congest
madeline heart failure than pneumonia.  However, the patient had recent COVID, tested negative for COVID, 
and this could be residual post COVID pneumonia.  Recommend to consult Pulmonary and Critical Care, a
nd also from the cardiac standpoint, this patient needs stabilization of his atrial fibrillation by isiah scott an ablation as he does not tolerate AFib.  I would also recommend placing a third lead to the LV
 for resynchronization as his ejection fraction has dropped since he had the pacemaker placed.  As harper
ch, I recommend the following;.

a.Lasix 60 mg IV q.12 h., carefully monitor BUN, creatinine, and electrolytes.

b.I recommend to transfer to a higher level care facility for an access to EP Service to place the t
hird lead in the LV to resynchronize the heart when he is euvolemic.  Also please note that his blood
 pressure is on the low side, which is concerning for early stages of cardiogenic shock, the patient 
definitely will benefit from higher level of care transfer.

2.Atrial fibrillation.  Rate is acceptable.  However, this patient as outlined above does not relate
 AFib and I recommend an AFib ablation for him, and if condition worsens and if the AFib goes out of 
control, then electrical cardioversion will be the next step. 



Thank you for the consult.





/ROCHELLE

DD:  03/03/2022 17:01:11Voice ID:  [id number]

DT:  03/03/2022 21:13:19Report ID:  [id number]

## 2022-03-03 NOTE — XMS REPORT
Continuity of Care Document

                            Created on:March 3, 2022



Patient:DEISI RICHTER

Sex:Male

:1950

External Reference #:[id number]





Demographics







                          Address                   [address]



                                                    [place] [address]

 

                          Home Phone                [phone]

 

                          Work Phone                [phone]

 

                          Mobile Phone              [phone]

 

                          Email Address             [email or web address]

 

                          Preferred Language        English

 

                          Marital Status            Unknown

 

                          Shinto Affiliation     Unknown

 

                          Race                      Unknown

 

                          Additional Race(s)        White



                                                    Unavailable

 

                          Ethnic Group              Unknown









Author







                          Organization              [facility]

t

 

                          Address                   [address]



                                                    [place] [address]

 

                          Phone                     [phone]









Support







                Name            Relationship    Address         Phone

 

                ROBER          Unavailable     [address]  [phone]



                                                [place] [address] 

 

                ROBER          Unavailable     [address]  [phone]



                                                [place] [address] 

 

                ROBER          Unavailable     [address]  [phone]



                                                [place] [address] 

 

                NATACHA        Unavailable     [address] [facility]  [phone]



                                                [place] [address] 









Care Team Providers







                    Name                Role                Phone

 

                    Trina              Attending Clinician Unavailable

 

                    HUMPHREY Maynard        Attending Clinician Unavailable

 

                    [place] VIANCA       Attending Clinician [phone]

 

                    MECHE Marmolejo           Attending Clinician Unavailable

 

                    Tano Muhammad    Attending Clinician Unavailable

 

                    ANKUSH Mosqueda        Attending Clinician Unavailable

 

                    Physician,  Primary or Family Admitting Clinician UnavailHUMPHREY Mccallum        Admitting Clinician Unavailable

 

                    Trina              Admitting Clinician Unavailable

 

                    Tano Muhammad    Admitting Clinician Unavailable

 

                    Referred            Admitting Clinician Unavailable









Payers







           Payer Name Policy Type Policy Number Effective Date Expiration Date S

ource







Problems







       Condition Condition Condition Status Onset  Resolution Last   Treating Co

mments 

Source



       Name   Details Category        Date   Date   Treatment Clinician        



                                                 Date                 

 

       Complete Complete Disease Active                              [facility]



       heart  heart                                               [facility]



       block  block                00:00:                             



                                   00                                 

 

       Pacemaker Pacemaker Disease Active                              [facility]



                                                                  [facility]



                                   00:00:                             



                                   00                                 

 

       Aortic Aortic Disease Active                              [facility]



       stenosis stenosis                                              Health



                                   00:00:                             



                                   00                                 

 

       Paroxysmal Paroxysmal Disease Active                              [facility]

T



       atrial atrial                                              Health



       fibrillati fibrillati               00:00:                             



       on     on                   00                                 

 

       Presence Presence Disease Active                              [facility]



       of     of                                                  Health



       Watchman Watchman               00:00:                             



       left   left                 00                                 



       atrial atrial                                                  



       appendage appendage                                                  



       closure closure                                                  



       device device                                                  

 

       S/P TAVR S/P TAVR Disease Active                              [facility]



       (transcath (transcath                                              He

alth



       eter   eter                 00:00:                             



       aortic aortic               00                                 



       valve  valve                                                   



       replacemen replacemen                                                  



       t)     t)                                                      







Allergies, Adverse Reactions, Alerts







       Allergy Allergy Status Severity Reaction(s) Onset  Inactive Treating Comm

ents 

Source



       Name   Type                        Date   Date   Clinician        

 

       Penicill Propensi Active        Rash                         [facility]



       ins    ty to                                               Health



              adverse                      00:00:                      



              reaction                      00                          



              s                                                       

 

       Penicill DA     Active [place]                                  [facility]



       ins                                                        Clear



                                          00:00:                      [place]



                                          00                          [facility]

 

       Penicill DA     Active MO     RASH                         [facility]



       ins                                                        Clear



                                          00:00:                      [place]



                                          00                          [facility]

 

       Penicill DA     Active U      UNKNOWN                       [facility]



       ins                                                        Clear



                                          00:00:                      [place]



                                          00                          [facility]

 

       Penicill DA     Active U                                   [facility]



       ins                                                        Clear



                                          00:00:                      [place]



                                          00                          [facility]

 

       Penicill DA     Active U                                   [facility]



       ins                                                        Clear



                                          00:00:                      [place]



                                          00                          [facility]

 

       Penicill DA     Active U      UNKNOWN                       [facility]



       ins                                20                        Clear



                                          00:00:                      [place]



                                          00                          [facility]







Social History







           Social Habit Start Date Stop Date  Quantity   Comments   Source

 

           Exposure to                       Not sure              [facility]



           SARS-CoV-2 (event)                                             

 

           Tobacco use and 2021 Never used            [facility]



           exposure   00:00:00   00:00:00                         

 

           Alcohol intake 2021 Ex-drinker            [facility]



                      00:00:00   00:00:00   (finding)             

 

           Sex Assigned At 1950 M                     [facility]



           Birth      00:00:00   00:00:00                         









                Smoking Status  Start Date      Stop Date       Source

 

                Never smoker                                    [facility]







Medications







       Ordered Filled Start  Stop   Current Ordering Indication Dosage Frequency

 Signature

                    Comments            Components          Source



     Medication Medication Date Date Medication? Clinician                (SIG) 

          



     Name Name                                                   

 

     Multiple            Yes                      Take by           [facility]



     Vitamins-Mi                                     mouth.           Health



     nerals      18:50:                                              



     (CENTRUM      22                                                



     SILVER PO)                                                        

 

     Multiple            Yes                      Take by           [facility]



     Vitamins-Mi                                     mouth.           Health



     nerals      18:50:                                              



     (CENTRUM      22                                                



     SILVER PO)                                                        

 

     BABY            Yes            81mg QD   Take 81 mg           [facility]



     ASPIRIN PO                                     by mouth 1           [facility]

[facility]



               18:45:                               (one) time           



               58                                 each day.           

 

     BABY            Yes            81mg QD   Take 81 mg           [facility]



     ASPIRIN PO      7-07                               by mouth 1           [facility]

[facility]



               18:45:                               (one) time           



               58                                 each day.           

 

     Xarelto 20            Yes            40mg QD   Take 40 mg           U

T



     MG tablet      6-24                               by mouth 1           [facility]

th



               00:00:                               (one) time           



               00                                 each day.           

 

     Xarelto 20            Yes            40mg QD   Take 40 mg           U

T



     MG tablet      6-24                               by mouth 1           Heal

th



               00:00:                               (one) time           



               00                                 each day.           

 

     pantoprazol            Yes                                     [facility]



     e         6-                                              Health



     (ProtoNix)      00:00:                                              



     40 MG EC      00                                                



     tablet                                                        

 

     rosuvastati            Yes                                     [facility]



     n (Crestor)      622                                              Health



     20 MG      00:00:                                              



     tablet      00                                                

 

     pantoprazol            Yes                                     [facility]



     e         6-22                                              Health



     (ProtoNix)      00:00:                                              



     40 MG EC      00                                                



     tablet                                                        

 

     rosuvastati            Yes                                     [facility]



     n (Crestor)      6-22                                              Health



     20 MG      00:00:                                              



     tablet      00                                                

 

     clopidogrel            Yes                                     [facility]



     (Plavix) 75      6-09                                              Health



     MG tablet      00:00:                                              



               00                                                

 

     clopidogrel      0      Yes                                     [facility]



     (Plavix) 75      6-09                                              Health



     MG tablet      00:00:                                              



               00                                                

 

     amiodarone            Yes                                     [facility]



     (Pacerone)      6-02                                              Health



     200 MG      00:00:                                              



     tablet      00                                                

 

     amiodarone      0      Yes                                     [facility]



     (Pacerone)      6-02                                              Health



     200 MG      00:00:                                              



     tablet      00                                                

 

     nitroglycer            Yes                      PLEASE SEE           

[facility]



     in                                       [facility]



     (Nitrostat)      00:00:                               FOR            



     0.4 MG SL      00                                 DETAILED           



     tablet                                         DIRECTIONS           

 

     nitroglycer            Yes                      PLEASE SEE           

[facility]



     in                                       [facility]           Health



     (Nitrostat)      00:00:                               FOR            



     0.4 MG SL      00                                 DETAILED           



     tablet                                         DIRECTIONS           

 

     furosemide      2020      Yes                      TAKE 1           [facility]



     (Lasix) 40      0-19                               TABLET BY           Heal

th



     MG tablet      00:00:                               ORAL ROUTE           



               00                                 EVERY DAY           



                                                  IF NEEDED           



                                                  FOR            



                                                  SWELLING           

 

     furosemide      2020      Yes                      TAKE 1           [facility]



     (Lasix) 40      0-19                               TABLET BY           Heal

th



     MG tablet      00:00:                               ORAL ROUTE           



               00                                 EVERY DAY           



                                                  IF NEEDED           



                                                  FOR            



                                                  SWELLING           







Vital Signs







             Vital Name   Observation Time Observation Value Comments     Source

 

             Systolic blood pressure 2021 18:40:00 131 mm[Hg]             

   [facility]

 

             Diastolic blood pressure 2021 18:40:00 79 mm[Hg]             

    [facility]

 

             Heart rate   2021 18:40:00 73 /min                   [facility]

h

 

             Body height  2021 18:40:00 177.8 cm                  [facility]

h

 

             Body weight  2021 18:40:00 136.896 kg                [facility]

h

 

             BMI          2021 18:40:00 43.30 kg/m2               [facility]

h

 

             Systolic blood pressure 2021 18:40:00 131 mm[Hg]             

   [facility]

 

             Diastolic blood pressure 2021 18:40:00 79 mm[Hg]             

    [facility]

 

             Heart rate   2021 18:40:00 73 /min                   [facility]

h

 

             Body height  2021 18:40:00 177.8 cm                  [facility]

h

 

             Body weight  2021 18:40:00 136.896 kg                [facility]

h

 

             BMI          2021 18:40:00 43.30 kg/m2               [facility]

h







Procedures







                Procedure       Date / Time Performed Performing Clinician Mandie turner

 

                [id number]         2021 00:00:00 LIMMA.03        [facility]

 

                [id number]         2021 00:00:00 LIMMA.03        [facility]

 

                [id number]         2021 00:00:00 LIMMA.03        [facility]

 

                [id number]         2021 00:00:00 RASSA           [facility]

 

                [id number]         2021 00:00:00 RASSA           [facility]

 

                [id number]         2021 00:00:00 CHAAB.01        [facility]

 

                [id number]         2021 00:00:00 CHAAB.01        [facility]







Encounters







        Start   End     Encounter Admission Attending Care    Care    Encounter 

Source



        Date/Time Date/Time Type    Type    Clinicians Facility Department ID   

   

 

        2021         Inpatient GILDARDO      Trina, JOHNATHAN   OUTD    [id number] 

[facility]



        10:00:00                         Momo                  [id number]  [facility]

 

        2021         Inpatient EL      JOHNATHAN Mena   OUTD    [id number] 

[facility]



        14:30:00                         Momo                  [id number]  [facility]

 

        2021         Inpatient JOHNATHAN Frank   [facility]   [id number]

0 [facility]



        10:00:00                         Aida                 [id number]  [facility]

 

        2021 Outpatient JOHNATHAN Arenas   [facility]    [id number]

-20 [facility]



        05:29:00 05:29:00                 Momo                  [id number]  [facility]

 

        2021 Office          [facility]     [id number] [id number]

[id number] 



        13:11:48 13:53:00 Visit           Candis ACOSTA [id number]         



                                                CLINIC  [id number]         



                                                        [phone]         



                                                        1               

 

        2021 Office          [facility]     [id number] [id number]

[id number] [place]



        13:11:48 13:53:00 Visit           Candis ACOSTA [id number]         H

[facility]



                                                CLINIC  [id number]         



                                                        [phone]         



                                                        1               

 

        2021 Inpatient JOHNATHAN Cuenca   [facility]    [id number]

[id number] [facility]



        09:45:00 12:00:00                 Roys                 [id number]  [facility]

 

        2021 2021-06-10 Inpatient JÚNIOR BrinkCL   [facility]    [id number]

-20 [facility]



        09:13:00 17:55:00                 Reji                   [id number]  [facility]

 

        2021 Outpatient         JÚNIOR MosquedaCL   [facility]    [id number]

-20 [facility]



        13:00:00 13:00:00                 Dana                 [id number]  [facility]







Results







           Test Description Test Time  Test Comments Results    Result Comments 

Source









                    Novel Coronavirus 2019 Inhouse 2021 23:16:00 









                      Test Item  Value      Reference Range Interpretation Comme

nts









             Novel Coronavirus  Negative     Negative                  Posit

madeline results are indicative of the



             Inhouse (test code =                                        presenc

e [id number] RNA, clinical



             COVNONPUI)                                          correlation wit

h patient historyand



                                                                 other diagnosti

c information is



                                                                 necessary to de

terminepatient infection



                                                                 status. Positiv

e results do not rule



                                                                 outbacterial in

fection or co-infection



                                                                 with other viru

ses. Negative results do



                                                                 not preclude SA

RS-CoV-2 infection



                                                                 andshould not b

e used as the sole basis



                                                                 for patient man

agementdecisions.



                                                                 Negative result

s must be combined with



                                                                 otherclinical o

bservations, patient



                                                                 history, and ep

idemiologicalinformation.



                                                                 Detection of SA

RS-CoV-2 RNA may be



                                                                 affected bysamp

le collection methods,



                                                                 storage conditi

ons, and/or stageof



                                                                 infection. Marta

l RNA mutations,



                                                                 vaccinations, a

ntiviraltherapeutics,



                                                                 antibiotics, ch

emotherapeutic



                                                                 orimmunosuppres

sally drugs have not been



                                                                 evaluated for e

ffectson detection.



                                                                 Results are for

 the identification of



                                                                 SARS-CoV-2 RNA 

usingthe [employer] 



                                                                 System under th

e FDA Emergency



                                                                 UseAuthorizatio

n.  The testing is



                                                                 performed by genaro camprained in the



                                                                 procedures for 

the [employer] 



                                                                 moleculardiagno

stic SARS-CoV-2 assay in



                                                                 vitro.



BASIC METABOLIC [id number] 11:07:00





             Test Item    Value        Reference Range Interpretation Comments

 

             SODIUM (test code = NA) 143 mEq/L    134-147      N            

 

             POTASSIUM (test code = 4.0 mEq/L    3.4-5.0      N            



             K)                                                  

 

             CHLORIDE (test code = 107 mEq/L    100-108      N            



             CL)                                                 

 

             CARBON DIOXIDE (test 27 mEq/l     21-33        N            



             code = CO2)                                         

 

             ANION GAP (test code = 13           0-20         N            



             GAP)                                                

 

             GLUCOSE (test code = 136 mg/dL           H            



             GLU)                                                

 

             BLOOD UREA NITROGEN 16 mg/dL     7-18         N            



             (test code = BUN)                                        

 

             GLOMERULAR FILTRATION 59.5         70-80        L            Units 

of measure =



             RATE (test code = GFR)                                        ml/mi

n/1.73 m2

 

             CREATININE (test code = 1.2 mg/dL    0.6-1.3      N            



             CREAT)                                              

 

             CALCIUM (test code = 8.9 mg/dL    8.0-10.5     N            



             CA)                                                 



PROTHROMBIN [id number] 11:03:00





             Test Item    Value        Reference Range Interpretation Comments

 

             PROTHROMBIN TIME 19.5 SECONDS 9.3-12.9     H            



             PATIENT (test code =                                        



             PTP)                                                

 

             INTERNATIONAL NORMAL 1.8          0.8-1.2      H                   

         TARGET



             RATIO (test code =                                        INR BY IN

DICATION



             INR)                                                Indication



                                                                 



                                                                   INR1. Prophyl

axis of



                                                                 venous thrombos

is



                                                                        2.0 - 3.

0



                                                                 (orthopedic cande

amaya),



                                                                 Prophylaxis of



                                                                 venous thrombos

is



                                                                 (other than hig

h-risk



                                                                  surgery), Mary

tment



                                                                 of Deep Vein



                                                                 Thrombosis/Pulm

onary



                                                                 Embolism, Preve

ntion



                                                                 of systemic emb

olism -



                                                                 Tissue heart va

lves,



                                                                 Acute Myocardia

l



                                                                 Infarction (to 

prevent



                                                                   systemic embo

lism),



                                                                 Valvular heart



                                                                 disease,   Atri

al



                                                                 Fibrillation,



                                                                 Bileaflet mecha

nical



                                                                 valve in aortic



                                                                 position.2. Mec

hanical



                                                                 prosthetic valv

es



                                                                 (high risk),   

 2.5 -



                                                                 3.5   Presence 

of



                                                                 Lupus Anticoagu

lant or



                                                                   Antiphospholi

pid



                                                                 Antibodies, Pre

vention



                                                                 of   systemic e

mbolism



                                                                 - Acute Myocard

ial



                                                                 Infarction   (t

o



                                                                 prevent recurre

nt



                                                                 infarct).



CBC W/AUTO [id number] 10:57:00





             Test Item    Value        Reference Range Interpretation Comments

 

             WHITE BLOOD CELL (test code = 9.0 x10 3/uL 4.5-11.0     N          

  



             WBC)                                                

 

             RED BLOOD CELL (test code = 4.47 x10 6/uL 4.00-5.60    N           

 



             RBC)                                                

 

             HEMOGLOBIN (test code = HGB) 12.4 g/dL    12.5-16.9    L           

 

 

             HEMATOCRIT (test code = HCT) 39.3 %       37.5-50.7    N           

 

 

             MEAN CELL VOLUME (test code = 87.9 fL      81.0-99.0    N          

  



             MCV)                                                

 

             MEAN CELL HGB (test code = MCH) 27.7 pg      27.0-33.0    N        

    

 

             MEAN CELL HGB CONCETRATION 31.6 g/dL    33.0-37.0    L            



             (test code = MCHC)                                        

 

             RED CELL DISTRIBUTION WIDTH CV 14.6 %       11.5-14.5    H         

   



             (test code = RDW)                                        

 

             RED CELL DISTRIBUTION WIDTH SD 47.0 fL      37.0-54.0    N         

   



             (test code = RDW-SD)                                        

 

             PLATELET COUNT (test code = 123 x10 3/uL 150-400      L            



             PLT)                                                

 

             MEAN PLATELET VOLUME (test code 11.2 fL      7.0-9.0      H        

    



             = MPV)                                              

 

             NEUTROPHIL % (test code = NT%) 73.7 %       56.0-77.0    N         

   

 

             IMMATURE GRANULOCYTE % (test 1.0 %        0.0-2.0      N           

 



             code = IG%)                                         

 

             LYMPHOCYTE % (test code = LY%) 13.2 %       14.0-32.0    L         

   

 

             MONOCYTE % (test code = MO%) 8.0 %        4.8-9.0      N           

 

 

             EOSINOPHIL % (test code = EO%) 3.1 %        0.3-3.7      N         

   

 

             BASOPHIL % (test code = BA%) 1.0 %        0.0-2.0      N           

 

 

             NUCLEATED RBC % (test code = 0.0 %        0-0          N           

 



             NRBC%)                                              

 

             NEUTROPHIL # (test code = NT#) 6.65 x10 3/uL 2.0-7.6      N        

    

 

             IMMATURE GRANULOCYTE # (test 0.09 x10 3/uL 0.00-0.03    H          

  



             code = IG#)                                         

 

             LYMPHOCYTE # (test code = LY#) 1.19 x10 3/uL 1.0-3.8      N        

    

 

             MONOCYTE # (test code = MO#) 0.72 x10 3/uL 0.1-0.8      N          

  

 

             EOSINOPHIL # (test code = EO#) 0.28 x10 3/uL 0.0-0.2      H        

    

 

             BASOPHIL # (test code = BA#) 0.09 x10 3/uL 0.0-0.2      N          

  

 

             NUCLEATED RBC # (test code = 0.00 x10 3/uL 0.0-0.1      N          

  



             NRBC#)                                              

 

             MANUAL DIFF REQUIRED (test code NO                                 

    



             = MDIFF)                                            



CBC W/AUTO [id number] 10:47:00





             Test Item    Value        Reference Range Interpretation Comments

 

             WHITE BLOOD CELL (test code = WBC)  x10 3/uL    4.5-11.0           

       

 

             RED BLOOD CELL (test code = RBC)  x10 6/uL    4.00-5.60            

     

 

             HEMOGLOBIN (test code = HGB)  g/dL        12.5-16.9                

 

 

             HEMATOCRIT (test code = HCT) 39.3 %       37.5-50.7    N           

 

 

             MEAN CELL VOLUME (test code = MCV)  fL          81.0-99.0          

       

 

             MEAN CELL HGB (test code = MCH)  pg          27.0-33.0             

    

 

             MEAN CELL HGB CONCETRATION (test  g/dL        33.0-37.0            

     



             code = MCHC)                                        

 

             RED CELL DISTRIBUTION WIDTH CV  %           11.5-14.5              

   



             (test code = RDW)                                        

 

             PLATELET COUNT (test code = PLT)  x10 3/uL    150-400              

     

 

             NEUTROPHIL % (test code = NT%)  %           56.0-77.0              

   

 

             LYMPHOCYTE % (test code = LY%)  %           14.0-32.0              

   

 

             NEUTROPHIL # (test code = NT#)  x10 3/uL    2.0-7.6                

   

 

             LYMPHOCYTE # (test code = LY#)  x10 3/uL    1.0-3.8                

   

 

             MANUAL DIFF REQUIRED (test code =                                  

      



             MDIFF)                                              



CBC W/AUTO [id number] 04:53:00





             Test Item    Value        Reference Range Interpretation Comments

 

             WHITE BLOOD CELL (test code = 13.1 x10 3/uL 4.5-11.0     H         

   



             WBC)                                                

 

             RED BLOOD CELL (test code = 3.16 x10 6/uL 4.00-5.60    L           

 



             RBC)                                                

 

             HEMOGLOBIN (test code = HGB) 9.4 g/dL     12.5-16.9    L           

 

 

             HEMATOCRIT (test code = HCT) 30.2 %       37.5-50.7    L           

 

 

             MEAN CELL VOLUME (test code = 95.6 fL      81.0-99.0    N          

  



             MCV)                                                

 

             MEAN CELL HGB (test code = 29.7 pg      27.0-33.0    N            



             MCH)                                                

 

             MEAN CELL HGB CONCETRATION 31.1 g/dL    33.0-37.0    L            



             (test code = MCHC)                                        

 

             RED CELL DISTRIBUTION WIDTH CV 15.2 %       11.5-14.5    H         

   



             (test code = RDW)                                        

 

             RED CELL DISTRIBUTION WIDTH SD 52.9 fL      37.0-54.0    N         

   



             (test code = RDW-SD)                                        

 

             PLATELET COUNT (test code = 99 x10 3/uL  150-400      L            



             PLT)                                                

 

             MEAN PLATELET VOLUME (test 11.3 fL      7.0-9.0      H            



             code = MPV)                                         

 

             NEUTROPHIL % (test code = NT%) 80.1 %       56.0-77.0    H         

   

 

             IMMATURE GRANULOCYTE % (test 1.2 %        0.0-2.0      N           

 



             code = IG%)                                         

 

             LYMPHOCYTE % (test code = LY%) 7.7 %        14.0-32.0    L         

   

 

             MONOCYTE % (test code = MO%) 8.0 %        4.8-9.0      N           

 

 

             EOSINOPHIL % (test code = EO%) 2.5 %        0.3-3.7      N         

   

 

             BASOPHIL % (test code = BA%) 0.5 %        0.0-2.0      N           

 

 

             NUCLEATED RBC % (test code = 0.0 %        0-0          N           

 



             NRBC%)                                              

 

             NEUTROPHIL # (test code = NT#) 10.48 x10 3/uL 2.0-7.6      H       

     

 

             IMMATURE GRANULOCYTE # (test 0.16 x10 3/uL 0.00-0.03    H          

  



             code = IG#)                                         

 

             LYMPHOCYTE # (test code = LY#) 1.01 x10 3/uL 1.0-3.8      N        

    

 

             MONOCYTE # (test code = MO#) 1.05 x10 3/uL 0.1-0.8      H          

  

 

             EOSINOPHIL # (test code = EO#) 0.33 x10 3/uL 0.0-0.2      H        

    

 

             BASOPHIL # (test code = BA#) 0.06 x10 3/uL 0.0-0.2      N          

  

 

             NUCLEATED RBC # (test code = 0.00 x10 3/uL 0.0-0.1      N          

  



             NRBC#)                                              

 

             MANUAL DIFF REQUIRED (test NO                                     



             code = MDIFF)                                        



PLT [id number] 04:53:00





             Test Item    Value        Reference Range Interpretation Comments

 

             PLATELET ESTIMATE (test code 104-130 THOUSAND ADEQUATE             

     



             = PLTEST)                                           

 

             PLATELET MORPHOLOGY (test NORMAL                                 



             code = PLTMORPH)                                        



COMPREHENSIVE METABOLIC [id number] 04:48:00





             Test Item    Value        Reference Range Interpretation Comments

 

             SODIUM (test code = NA) 140 mEq/L    134-147      N            

 

             POTASSIUM (test code = 4.1 mEq/L    3.4-5.0      N            



             K)                                                  

 

             CHLORIDE (test code = 112 mEq/L    100-108      H            



             CL)                                                 

 

             CARBON DIOXIDE (test 24 mEq/l     21-33        N            



             code = CO2)                                         

 

             ANION GAP (test code = 8            0-20         N            



             GAP)                                                

 

             GLUCOSE (test code = 120 mg/dL           H            



             GLU)                                                

 

             BLOOD UREA NITROGEN 15 mg/dL     7-18         N            



             (test code = BUN)                                        

 

             GLOMERULAR FILTRATION 73.5         70-80        N            Units 

of measure =



             RATE (test code = GFR)                                        ml/mi

n/1.73 m2

 

             CREATININE (test code = 1.0 mg/dL    0.6-1.3      N            



             CREAT)                                              

 

             TOTAL PROTEIN (test 6.1 g/dL     6.4-8.2      L            



             code = PROT)                                        

 

             ALBUMIN (test code = 3.40 g/dL    3.4-5.0      N            



             ALB)                                                

 

             CALCIUM (test code = 7.9 mg/dL    8.0-10.5     L            



             CA)                                                 

 

             BILIRUBIN TOTAL (test 0.60 mg/dL   0.0-1.0      N            



             code = BILT)                                        

 

             SGOT/AST (test code = 27 IUnit/L   15-37        N            



             AST)                                                

 

             SGPT/ALT (test code = 36 IUnit/L   30-65        N            



             ALT)                                                

 

             ALKALINE PHOSPHATASE 44 IUnit/L          N            



             TOTAL (test code =                                        



             ALKP)                                               



CBC W/AUTO [id number] 04:32:00





             Test Item    Value        Reference Range Interpretation Comments

 

             WHITE BLOOD CELL (test code = 13.1 x10 3/uL 4.5-11.0     H         

   



             WBC)                                                

 

             RED BLOOD CELL (test code = 3.16 x10 6/uL 4.00-5.60    L           

 



             RBC)                                                

 

             HEMOGLOBIN (test code = HGB) 9.4 g/dL     12.5-16.9    L           

 

 

             HEMATOCRIT (test code = HCT) 30.2 %       37.5-50.7    L           

 

 

             MEAN CELL VOLUME (test code = 95.6 fL      81.0-99.0    N          

  



             MCV)                                                

 

             MEAN CELL HGB (test code = 29.7 pg      27.0-33.0    N            



             MCH)                                                

 

             MEAN CELL HGB CONCETRATION 31.1 g/dL    33.0-37.0    L            



             (test code = MCHC)                                        

 

             RED CELL DISTRIBUTION WIDTH CV 15.2 %       11.5-14.5    H         

   



             (test code = RDW)                                        

 

             RED CELL DISTRIBUTION WIDTH SD 52.9 fL      37.0-54.0    N         

   



             (test code = RDW-SD)                                        

 

             PLATELET COUNT (test code = 99 x10 3/uL  150-400      L            



             PLT)                                                

 

             MEAN PLATELET VOLUME (test 11.3 fL      7.0-9.0      H            



             code = MPV)                                         

 

             NEUTROPHIL % (test code = NT%) 80.1 %       56.0-77.0    H         

   

 

             IMMATURE GRANULOCYTE % (test 1.2 %        0.0-2.0      N           

 



             code = IG%)                                         

 

             LYMPHOCYTE % (test code = LY%) 7.7 %        14.0-32.0    L         

   

 

             MONOCYTE % (test code = MO%) 8.0 %        4.8-9.0      N           

 

 

             EOSINOPHIL % (test code = EO%) 2.5 %        0.3-3.7      N         

   

 

             BASOPHIL % (test code = BA%) 0.5 %        0.0-2.0      N           

 

 

             NUCLEATED RBC % (test code = 0.0 %        0-0          N           

 



             NRBC%)                                              

 

             NEUTROPHIL # (test code = NT#) 10.48 x10 3/uL 2.0-7.6      H       

     

 

             IMMATURE GRANULOCYTE # (test 0.16 x10 3/uL 0.00-0.03    H          

  



             code = IG#)                                         

 

             LYMPHOCYTE # (test code = LY#) 1.01 x10 3/uL 1.0-3.8      N        

    

 

             MONOCYTE # (test code = MO#) 1.05 x10 3/uL 0.1-0.8      H          

  

 

             EOSINOPHIL # (test code = EO#) 0.33 x10 3/uL 0.0-0.2      H        

    

 

             BASOPHIL # (test code = BA#) 0.06 x10 3/uL 0.0-0.2      N          

  

 

             NUCLEATED RBC # (test code = 0.00 x10 3/uL 0.0-0.1      N          

  



             NRBC#)                                              

 

             MANUAL DIFF REQUIRED (test NO                                     



             code = MDIFF)                                        



PLT [id number] 04:32:00





             Test Item    Value        Reference Range Interpretation Comments

 

             PLATELET ESTIMATE (test code =  THOUSAND    ADEQUATE               

   



             PLTEST)                                             



CBC W/AUTO [id number] 04:32:00





             Test Item    Value        Reference Range Interpretation Comments

 

             WHITE BLOOD CELL (test code = 13.1 x10 3/uL 4.5-11.0     H         

   



             WBC)                                                

 

             RED BLOOD CELL (test code = 3.16 x10 6/uL 4.00-5.60    L           

 



             RBC)                                                

 

             HEMOGLOBIN (test code = HGB) 9.4 g/dL     12.5-16.9    L           

 

 

             HEMATOCRIT (test code = HCT) 30.2 %       37.5-50.7    L           

 

 

             MEAN CELL VOLUME (test code = 95.6 fL      81.0-99.0    N          

  



             MCV)                                                

 

             MEAN CELL HGB (test code = 29.7 pg      27.0-33.0    N            



             MCH)                                                

 

             MEAN CELL HGB CONCETRATION 31.1 g/dL    33.0-37.0    L            



             (test code = MCHC)                                        

 

             RED CELL DISTRIBUTION WIDTH CV 15.2 %       11.5-14.5    H         

   



             (test code = RDW)                                        

 

             RED CELL DISTRIBUTION WIDTH SD 52.9 fL      37.0-54.0    N         

   



             (test code = RDW-SD)                                        

 

             PLATELET COUNT (test code = 99 x10 3/uL  150-400      L            



             PLT)                                                

 

             MEAN PLATELET VOLUME (test 11.3 fL      7.0-9.0      H            



             code = MPV)                                         

 

             NEUTROPHIL % (test code = NT%) 80.1 %       56.0-77.0    H         

   

 

             IMMATURE GRANULOCYTE % (test 1.2 %        0.0-2.0      N           

 



             code = IG%)                                         

 

             LYMPHOCYTE % (test code = LY%) 7.7 %        14.0-32.0    L         

   

 

             MONOCYTE % (test code = MO%) 8.0 %        4.8-9.0      N           

 

 

             EOSINOPHIL % (test code = EO%) 2.5 %        0.3-3.7      N         

   

 

             BASOPHIL % (test code = BA%) 0.5 %        0.0-2.0      N           

 

 

             NUCLEATED RBC % (test code = 0.0 %        0-0          N           

 



             NRBC%)                                              

 

             NEUTROPHIL # (test code = NT#) 10.48 x10 3/uL 2.0-7.6      H       

     

 

             IMMATURE GRANULOCYTE # (test 0.16 x10 3/uL 0.00-0.03    H          

  



             code = IG#)                                         

 

             LYMPHOCYTE # (test code = LY#) 1.01 x10 3/uL 1.0-3.8      N        

    

 

             MONOCYTE # (test code = MO#) 1.05 x10 3/uL 0.1-0.8      H          

  

 

             EOSINOPHIL # (test code = EO#) 0.33 x10 3/uL 0.0-0.2      H        

    

 

             BASOPHIL # (test code = BA#) 0.06 x10 3/uL 0.0-0.2      N          

  

 

             NUCLEATED RBC # (test code = 0.00 x10 3/uL 0.0-0.1      N          

  



             NRBC#)                                              

 

             MANUAL DIFF REQUIRED (test NO                                     



             code = MDIFF)                                        



PLT [id number] 04:32:00





             Test Item    Value        Reference Range Interpretation Comments

 

             PLATELET ESTIMATE (test code =  THOUSAND    ADEQUATE               

   



             PLTEST)                                             



[id number] 20:15:00





             Test Item    Value        Reference Range Interpretation Comments

 

             GLUBED (test code = 171 MG/DL           H            Performe

d by certified



             GLUBED)                                              at  [facility]



- XR CHEST 1 -31-87 16:52:00
************************************************************[facility]        : 1950        Sex: 
M************************************************************ FAX: Momo Dawson MD    [phone]    [place]:   St: ADM FAX:         Hermelinda Galaviz  [place] [phone]   
------------------------------------------------------------------------------- 
 Name:   DEISI RICHTER                  [facility]                    
: 1950 Age/S: 71/M           [address] [place] [address]         Unit #: 
[id number]      Loc: [address] [address]                 Phys: 
Viktoria Galavizmar GONSALEZ   Acct: [id number] Dis Date:               Status: ADM IN
                                 PHONE #: [phone]     Exam Date:     
2021     1134                   FAX #: [phone]     Reason: S/P 
PACEMAKER INSERTION                            EXAMS:     CPT CODE:      
[id number] XR CHEST 1 V                               [id number]                    
SINGLE VIEW RADIOGRAPH CHEST               INDICATION:  S/P PACEMAKER INSERTION.
               TECHNIQUE: A single view frontal radiograph of the chest was 
obtained.               COMPARISONS: Chest x-ray2021               
FINDINGS:                There is no acute osseous fracture or dislocation.There
 are       advanced degenerative changes of both shoulders.               There 
is no subdiaphragmatic free gas.               There is a new 2-lead cardiac 
pacemaker with control device in the   left chest soft tissues.  The leads are 
intact and appear to terminate       in normal positions.              There is 
stable cardiomegaly.  There is no mediastinal widening.        There is no pne
umomediastinum. There is moderate atherosclerotic       vascular calcification 
of the aorta.  There is a stable prosthetic       aortic valve.               
There are stable mild interstitial marking prominence.  There is       stable 
perihilar vascular congestion.  There is no pneumothorax.        There is a 
stable small right pleural effusion.  There is no pulmonary       lobar 
consolidation.          IMPRESSION:                   1.  There is a new 2-lead 
cardiac pacemaker device without evidence of         complication.         2.  
There is stable cardiomegaly and mild interstitial edema        suggesting 
congestive heart failure.         3.  There is a stable small right pleural 
effusion.  There is no         pneumothorax.                    ** 
Electronically Signed by NESTOR Frank **            **            on 
2021 at 1652            **                      Reported and signed by: 
Robert Frank D.O.       PAGE  1                       Signed Report(CONTINUED)
  FAX: Momo Dawson MD    [phone]    [place]:   St: ADM FAX:    
     Hermelinda Galaviz [phone]   
------------------------------------------------------------------------------- 
 Name:   DEISI RICHTER                  [facility]                    
: 1950  Age/S: 71/M           [address] [place], [place] [address]vd         Unit #: 
[id number]      Loc: [address]       [place] [address]                 Phys: 
Hermelinda Galaviz NP     Acct: [id number] Dis Date:               Status: ADM 
IN                                 PHONE#: [phone]     Exam Date:     
2021                   FAX #: [phone]     Reason: S/P 
PACEMAKER INSERTION                            EXAMS:       CPT CODE:      
[id number] XR CHEST 1 V                               [id number]               &lt;Co
ntinued&gt;                                       CC: Momo Mena MD; Hermelinda Galaviz NP                                                                     
                    Technologist: RT Imelda(R)                          
         Trnscrd Date/Time/By: 2021 () : By: [email or web address]          Orig 
Print D/T: S: 2021 (080)                         PAGE  2            
Signed [id number] 09:39:00





             Test Item    Value        Reference Range Interpretation Comments

 

             ACT-ISTAT (test code 136 SEC             N            Perform

ed by certified



             = ACTI)                                              at  [facility]



[id number] 08:28:00





             Test Item    Value        Reference Range Interpretation Comments

 

             ACT-ISTAT (test code 274 SEC             H            Perform

ed by certified



             = ACTI)                                              at  [facility]



Novel Coronavirus 2019 [id number] 06:28:00





             Test Item    Value        Reference Range Interpretation Comments

 

             Novel Coronavirus Negative     Negative                  Positive r

esults are



             2019 Inhouse (test                                        indicativ

e of the presence



             code = COVNONPUI)                                        ofSARS-CoV

-2 RNA, clinical



                                                                 correlation wit

h patient



                                                                 historyand othe

r



                                                                 diagnostic info

rmation is



                                                                 necessary to



                                                                 determinepatien

t infection



                                                                 status. Positiv

e results



                                                                 do not rule out

bacterial



                                                                 infection or co

-infection



                                                                 with other viru

ses.



                                                                 Negative result

s do not



                                                                 preclude SARS-C

oV-2



                                                                 infection andsh

ould not be



                                                                 used as the sol

e basis for



                                                                 patient



                                                                 managementdecis

ions.



                                                                 Negative result

s must be



                                                                 combined with



                                                                 otherclinical



                                                                 observations, p

atient



                                                                 history, and



                                                                 epidemiological

information



                                                                 . Detection of 

SARS-CoV-2



                                                                 RNA may be affe

cted



                                                                 bysample collec

tion



                                                                 methods, storag

e



                                                                 conditions, and

/or stageof



                                                                 infection. Marta

l RNA



                                                                 mutations, vacc

inations,



                                                                 antiviraltherap

eutics,



                                                                 antibiotics,



                                                                 chemotherapeuti

c



                                                                 orimmunosuppres

sally drugs



                                                                 have not been e

valuated



                                                                 for effectson d

etection.



                                                                 Results are for

 the



                                                                 identification 

of



                                                                 SARS-CoV-2 RNA 

usingthe



                                                                 [place]

stem under



                                                                 the FDA Emergen

cy



                                                                 UseAuthorizatio

n.  The



                                                                 testing is perf

ormed by



                                                                 personneltraine

d in the



                                                                 procedures for 

the [employer]



                                                                  molecular

diagnostic



                                                                 SARS-CoV-2 assa

y in vitro.



- XR CHEST 2 -05-88 14:34:00
************************************************************[facility]        : 1950        Sex: 
M************************************************************ FAX: Momo Dawson MD    [phone]    [place]:   St: PRE---------------------
----------------------------------------------------------  Name:   
DEISI RICHTER [facility]                    : 1950  Age/S: 71/M  
         [address] [place] [address] Blvd     Unit #: [id number]      Loc: [place] [address]                 Phys: Momo Mena MD                        
                           Acct: [id number] Dis Date:               Status: 
PRE [facility]                                PHONE #: [phone]     Exam Date:     
2021     1147                   FAX #: [phone]     Reason: WATCHMAN  
 EXAMS:                                               CPT CODE:      [id number] 
XR CHEST 2 V                       14654                    Study:  - XR CHEST 2
 V 2021 11:17 AM   Patient Name: DEISI RICHTER MR: [id number]       : 
1950; Age: 71 years  y/o Male       Ordering Physician: Momo Mena MD  
             Clinical Indication: WATCHMAN               Comparison: Michell 10, 
2021 x-ray               FINDINGS               LUNGS: Subtle perihilar 
opacities.  Right basilar linear atelectasis.               HEART AND 
MEDIASTINUM: Mildly enlarged heart. Prosthetic aortic valve.               
LINES: None.               OSSEOUS STRUCTURES: Mild spinal degenerative change 
without fracture,       dislocation, or focal osseous lesion.               
OTHER: None.                    IMPRESSION:                   Subtle perihilar 
opacities, which may represent mild pulmonary edema         or pneumonia.       
  Stable cardiomegaly     SL: [id number]          ** Electronically Signed by 
RADHA Moyer on 2021 at 1438 **                      Reported and 
signed by: Dennis Moyer M.D.       CC: Momo Mena MD         Technologist: 
RT Joe(R)                                  Trnscrd Date/Time/By: 0
2021 (3270) : By: [email or web address]          Orig Print D/T: S: 2021 (2836) 
  PAGE  1                       Signed ReportPROTHROMBIN [id number] 12:31:00





             Test Item    Value        Reference Range Interpretation Comments

 

             PROTHROMBIN TIME 13.0 SECONDS 9.3-12.9     H            



             PATIENT (test code =                                        



             PTP)                                                

 

             INTERNATIONAL NORMAL 1.2          0.8-1.2      N                   

         TARGET



             RATIO (test code =                                        INR BY IN

DICATION



             INR)                                                Indication



                                                                 



                                                                   INR1. Prophyl

axis of



                                                                 venous thrombos

is



                                                                        2.0 - 3.

0



                                                                 (orthopedic cande

amaya),



                                                                 Prophylaxis of



                                                                 venous thrombos

is



                                                                 (other than hig

h-risk



                                                                  surgery), Mary

tment



                                                                 of Deep Vein



                                                                 Thrombosis/Pulm

onary



                                                                 Embolism, Preve

ntion



                                                                 of systemic emb

olism -



                                                                 Tissue heart va

lves,



                                                                 Acute Myocardia

l



                                                                 Infarction (to 

prevent



                                                                   systemic embo

lism),



                                                                 Valvular heart



                                                                 disease,   Atri

al



                                                                 Fibrillation,



                                                                 Bileaflet mecha

nical



                                                                 valve in aortic



                                                                 position.2. Mec

hanical



                                                                 prosthetic valv

es



                                                                 (high risk),   

 2.5 -



                                                                 3.5   Presence 

of



                                                                 Lupus Anticoagu

lant or



                                                                   Antiphospholi

pid



                                                                 Antibodies, Pre

vention



                                                                 of   systemic e

mbolism



                                                                 - Acute Myocard

ial



                                                                 Infarction   (t

o



                                                                 prevent recurre

nt



                                                                 infarct).



BASIC METABOLIC [id number] 12:28:00





             Test Item    Value        Reference Range Interpretation Comments

 

             SODIUM (test code = NA) 144 mEq/L    134-147      N            

 

             POTASSIUM (test code = 4.1 mEq/L    3.4-5.0      N            



             K)                                                  

 

             CHLORIDE (test code = 108 mEq/L    100-108      N            



             CL)                                                 

 

             CARBON DIOXIDE (test 28 mEq/l     21-33        N            



             code = CO2)                                         

 

             ANION GAP (test code = 12           0-20         N            



             GAP)                                                

 

             GLUCOSE (test code = 111 mg/dL           H            



             GLU)                                                

 

             BLOOD UREA NITROGEN 17 mg/dL     7-18         N            



             (test code = BUN)                                        

 

             GLOMERULAR FILTRATION 54.3         70-80        L            Units 

of measure =



             RATE (test code = GFR)                                        ml/mi

n/1.73 m2

 

             CREATININE (test code = 1.3 mg/dL    0.6-1.3      N            



             CREAT)                                              

 

             CALCIUM (test code = 8.9 mg/dL    8.0-10.5     N            



             CA)                                                 



[id number] 12:28:00





             Test Item    Value        Reference Range Interpretation Comments

 

             PREALBUMIN (test code = PREALB)  mg/dL       16.0-40.0             

    



BASIC METABOLIC [id number] 12:28:00





             Test Item    Value        Reference Range Interpretation Comments

 

             SODIUM (test code = NA) 144 mEq/L    134-147      N            

 

             POTASSIUM (test code = 4.1 mEq/L    3.4-5.0      N            



             K)                                                  

 

             CHLORIDE (test code = 108 mEq/L    100-108      N            



             CL)                                                 

 

             CARBON DIOXIDE (test 28 mEq/l     21-33        N            



             code = CO2)                                         

 

             ANION GAP (test code = 12           0-20         N            



             GAP)                                                

 

             GLUCOSE (test code = 111 mg/dL           H            



             GLU)                                                

 

             BLOOD UREA NITROGEN 17 mg/dL     7-18         N            



             (test code = BUN)                                        

 

             GLOMERULAR FILTRATION 54.3         70-80        L            Units 

of measure =



             RATE (test code = GFR)                                        ml/mi

n/1.73 m2

 

             CREATININE (test code = 1.3 mg/dL    0.6-1.3      N            



             CREAT)                                              

 

             CALCIUM (test code = 8.9 mg/dL    8.0-10.5     N            



             CA)                                                 



[id number] 12:28:00





             Test Item    Value        Reference Range Interpretation Comments

 

             PREALBUMIN (test code = PREALB) 27.3 mg/dL   16.0-40.0    N        

    



PROTHROMBIN [id number] 12:26:00





             Test Item    Value        Reference Range Interpretation Comments

 

             PROTHROMBIN TIME  SECONDS     9.3-12.9                  



             PATIENT (test code =                                        



             PTP)                                                

 

             INTERNATIONAL NORMAL 1.2          0.8-1.2      N                   

         TARGET



             RATIO (test code =                                        INR BY IN

DICATION



             INR)                                                Indication



                                                                 



                                                                 INR1. Prophylax

is of



                                                                 venous thrombos

is



                                                                      2.0 - 3.0



                                                                 (orthopedic cande

amaya),



                                                                 Prophylaxis of 

  venous



                                                                 thrombosis (oth

er than



                                                                 high-risk   cande

amaya),



                                                                 Treatment of De

ep Vein



                                                                 Thrombosis/Pulm

onary



                                                                 Embolism, Preve

ntion



                                                                 of systemic emb

olism -



                                                                 Tissue heart va

lves,



                                                                 Acute Myocardia

l



                                                                 Infarction (to 

prevent



                                                                 systemic emboli

sm),



                                                                 Valvular heart 

disease,



                                                                  Atrial Fibrill

ation,



                                                                 Bileaflet mecha

nical



                                                                 valve in aortic



                                                                 position.2. Mec

hanical



                                                                 prosthetic valv

es (high



                                                                 risk),    2.5 -

 3.5



                                                                 Presence of Lup

us



                                                                 Anticoagulant o

r



                                                                 Antiphospholipi

d



                                                                 Antibodies, Pre

vention



                                                                 of   systemic e

mbolism -



                                                                 Acute Myocardia

l



                                                                 Infarction   (t

o prevent



                                                                 recurrent infar

ct).



CBC W/AUTO [id number] 12:12:00





             Test Item    Value        Reference Range Interpretation Comments

 

             WHITE BLOOD CELL (test code = 11.5 x10 3/uL 4.5-11.0     H         

   



             WBC)                                                

 

             RED BLOOD CELL (test code = 3.87 x10 6/uL 4.00-5.60    L           

 



             RBC)                                                

 

             HEMOGLOBIN (test code = HGB) 11.3 g/dL    12.5-16.9    L           

 

 

             HEMATOCRIT (test code = HCT) 36.6 %       37.5-50.7    L           

 

 

             MEAN CELL VOLUME (test code = 94.6 fL      81.0-99.0    N          

  



             MCV)                                                

 

             MEAN CELL HGB (test code = MCH) 29.2 pg      27.0-33.0    N        

    

 

             MEAN CELL HGB CONCETRATION 30.9 g/dL    33.0-37.0    L            



             (test code = MCHC)                                        

 

             RED CELL DISTRIBUTION WIDTH CV 15.2 %       11.5-14.5    H         

   



             (test code = RDW)                                        

 

             RED CELL DISTRIBUTION WIDTH SD 51.9 fL      37.0-54.0    N         

   



             (test code = RDW-SD)                                        

 

             PLATELET COUNT (test code = 127 x10 3/uL 150-400      L            



             PLT)                                                

 

             MEAN PLATELET VOLUME (test code 11.8 fL      7.0-9.0      H        

    



             = MPV)                                              

 

             NEUTROPHIL % (test code = NT%) 75.4 %       56.0-77.0    N         

   

 

             IMMATURE GRANULOCYTE % (test 1.5 %        0.0-2.0      N           

 



             code = IG%)                                         

 

             LYMPHOCYTE % (test code = LY%) 10.6 %       14.0-32.0    L         

   

 

             MONOCYTE % (test code = MO%) 7.2 %        4.8-9.0      N           

 

 

             EOSINOPHIL % (test code = EO%) 4.4 %        0.3-3.7      H         

   

 

             BASOPHIL % (test code = BA%) 0.9 %        0.0-2.0      N           

 

 

             NUCLEATED RBC % (test code = 0.0 %        0-0          N           

 



             NRBC%)                                              

 

             NEUTROPHIL # (test code = NT#) 8.64 x10 3/uL 2.0-7.6      H        

    

 

             IMMATURE GRANULOCYTE # (test 0.17 x10 3/uL 0.00-0.03    H          

  



             code = IG#)                                         

 

             LYMPHOCYTE # (test code = LY#) 1.22 x10 3/uL 1.0-3.8      N        

    

 

             MONOCYTE # (test code = MO#) 0.83 x10 3/uL 0.1-0.8      H          

  

 

             EOSINOPHIL # (test code = EO#) 0.51 x10 3/uL 0.0-0.2      H        

    

 

             BASOPHIL # (test code = BA#) 0.10 x10 3/uL 0.0-0.2      N          

  

 

             NUCLEATED RBC # (test code = 0.00 x10 3/uL 0.0-0.1      N          

  



             NRBC#)                                              

 

             MANUAL DIFF REQUIRED (test code NO                                 

    



             = MDIFF)                                            



- XR CHEST 1 -06-10 08:05:00
********************************[facility]****[facility]        : 1950        Sex: 
M************************************************************ FAX: Reji Leger [phone]    [place]:   St: ADM FAX:         Maria Del Carmen Vasquez [phone]    FAX: Aida Hairston [phone]   
------------------------------------------------------------------------------- 
 Name:   DEISI RICHTER                  [facility]                    
: 1950  Age/S: 71/M           [address] [place] [address]    Unit #: 
[id number]      Loc: [address] [place] [id number]                 Phys: 
Maria Del Carmen Dove NP                                               Acct: 
[id number] Dis Date:               Status: ADM IN                             
    PHONE #: [phone]     Exam Date:     06/10/2021     0550                
   FAX #: [phone]     Reason: SOB  EXAMS:                                  
             CPT CODE:      [id number] XR CHEST 1 V                      03980   
                 Clinical Indication: Shortness of breath.       Comparison: 
2021.                 Impression: Chest, single view submitted on 2 images. 
 Cardiomegaly       and interstitial edema, improved from prior.  Trace pleural 
fluid         bilaterally.  No pneumothorax.  Osseous structures are unchanged. 
                            SL:  [id number]          ** Electronically Signed 
by RADHA Hoang **            **            on 06/10/2021 at 0805         
   **                      Reported and signed by: Gerber Hoang M.D.          
 CC: Reji Muhammad MD; Maria Del Carmen Dove NP;                        
Aida Maynard MD                                           Technologist: 
Duane Stucker, RT(R); Lucy FelixRT(R)             Trnscrd Date/Time/By: 
06/10/2021 (805) : By: [id number]          Orig Print D/T: S: 06/10/2021 (809)
                         PAGE  1                       Signed ReportBASIC 
METABOLIC PANEL2021-06-10 05:55:00





             Test Item    Value        Reference Range Interpretation Comments

 

             SODIUM (test code = NA) 142 mEq/L    134-147      N            

 

             POTASSIUM (test code = 3.5 mEq/L    3.4-5.0      N            



             K)                                                  

 

             CHLORIDE (test code = 105 mEq/L    100-108                   



             CL)                                                 

 

             CARBON DIOXIDE (test 30 mEq/l     21-33                     



             code = CO2)                                         

 

             ANION GAP (test code = 11           0-20         N            



             GAP)                                                

 

             GLUCOSE (test code = 113 mg/dL           H            



             GLU)                                                

 

             BLOOD UREA NITROGEN 22 mg/dL     7-18         H            



             (test code = BUN)                                        

 

             GLOMERULAR FILTRATION 59.5         70-80        L            Units 

of measure =



             RATE (test code = GFR)                                        ml/mi

n/1.73 m2

 

             CREATININE (test code = 1.2 mg/dL    0.6-1.3      N            



             CREAT)                                              

 

             CALCIUM (test code = 8.9 mg/dL    8.0-10.5     N            



             CA)                                                 



MAGNESIUM2021-06-10 05:55:00





             Test Item    Value        Reference Range Interpretation Comments

 

             MAGNESIUM (test code = MAG) 1.86 mg/dL   1.80-2.40    N            



CBC W/AUTO DIFF2021-06-10 05:37:00





             Test Item    Value        Reference Range Interpretation Comments

 

             WHITE BLOOD CELL (test code = 12.3 x10 3/uL 4.5-11.0     H         

   



             WBC)                                                

 

             RED BLOOD CELL (test code = 3.48 x10 6/uL 4.00-5.60    L           

 



             RBC)                                                

 

             HEMOGLOBIN (test code = HGB) 10.1 g/dL    12.5-16.9    L           

 

 

             HEMATOCRIT (test code = HCT) 32.6 %       37.5-50.7    L           

 

 

             MEAN CELL VOLUME (test code = 93.7 fL      81.0-99.0    N          

  



             MCV)                                                

 

             MEAN CELL HGB (test code = MCH) 29.0 pg      27.0-33.0    N        

    

 

             MEAN CELL HGB CONCETRATION 31.0 g/dL    33.0-37.0    L            



             (test code = MCHC)                                        

 

             RED CELL DISTRIBUTION WIDTH CV 14.4 %       11.5-14.5    N         

   



             (test code = RDW)                                        

 

             RED CELL DISTRIBUTION WIDTH SD 48.3 fL      37.0-54.0    N         

   



             (test code = RDW-SD)                                        

 

             PLATELET COUNT (test code = 113 x10 3/uL 150-400      L            



             PLT)                                                

 

             MEAN PLATELET VOLUME (test code 11.6 fL      7.0-9.0      H        

    



             = MPV)                                              

 

             NEUTROPHIL % (test code = NT%) 72.8 %       56.0-77.0    N         

   

 

             IMMATURE GRANULOCYTE % (test 1.0 %        0.0-2.0      N           

 



             code = IG%)                                         

 

             LYMPHOCYTE % (test code = LY%) 14.3 %       14.0-32.0    N         

   

 

             MONOCYTE % (test code = MO%) 8.1 %        4.8-9.0      N           

 

 

             EOSINOPHIL % (test code = EO%) 3.1 %        0.3-3.7      N         

   

 

             BASOPHIL % (test code = BA%) 0.7 %        0.0-2.0      N           

 

 

             NUCLEATED RBC % (test code = 0.0 %        0-0          N           

 



             NRBC%)                                              

 

             NEUTROPHIL # (test code = NT#) 8.99 x10 3/uL 2.0-7.6      H        

    

 

             IMMATURE GRANULOCYTE # (test 0.12 x10 3/uL 0.00-0.03    H          

  



             code = IG#)                                         

 

             LYMPHOCYTE # (test code = LY#) 1.76 x10 3/uL 1.0-3.8      N        

    

 

             MONOCYTE # (test code = MO#) 1.00 x10 3/uL 0.1-0.8      H          

  

 

             EOSINOPHIL # (test code = EO#) 0.38 x10 3/uL 0.0-0.2      H        

    

 

             BASOPHIL # (test code = BA#) 0.09 x10 3/uL 0.0-0.2      N          

  

 

             NUCLEATED RBC # (test code = 0.00 x10 3/uL 0.0-0.1      N          

  



             NRBC#)                                              

 

             MANUAL DIFF REQUIRED (test code NO                                 

    



             = MDIFF)                                            



COMPREHENSIVE METABOLIC [id number] 04:55:00





             Test Item    Value        Reference Range Interpretation Comments

 

             SODIUM (test code = NA) 143 mEq/L    134-147      N            

 

             POTASSIUM (test code = 3.9 mEq/L    3.4-5.0      N            



             K)                                                  

 

             CHLORIDE (test code = 112 mEq/L    100-108      H            



             CL)                                                 

 

             CARBON DIOXIDE (test 23 mEq/l     21-33        N            



             code = CO2)                                         

 

             ANION GAP (test code = 12           0-20         N            



             GAP)                                                

 

             GLUCOSE (test code = 125 mg/dL           H            



             GLU)                                                

 

             BLOOD UREA NITROGEN 19 mg/dL     7-18         H            



             (test code = BUN)                                        

 

             GLOMERULAR FILTRATION 73.5         70-80        N            Units 

of measure =



             RATE (test code = GFR)                                        ml/mi

n/1.73 m2

 

             CREATININE (test code = 1.0 mg/dL    0.6-1.3      N            



             CREAT)                                              

 

             TOTAL PROTEIN (test 6.2 g/dL     6.4-8.2      L            



             code = PROT)                                        

 

             ALBUMIN (test code = 3.70 g/dL    3.4-5.0      N            



             ALB)                                                

 

             CALCIUM (test code = 8.3 mg/dL    8.0-10.5     N            



             CA)                                                 

 

             BILIRUBIN TOTAL (test 0.40 mg/dL   0.0-1.0      N            



             code = BILT)                                        

 

             SGOT/AST (test code = 20 IUnit/L   15-37        N            



             AST)                                                

 

             SGPT/ALT (test code = 19 IUnit/L   30-65        L            



             ALT)                                                

 

             ALKALINE PHOSPHATASE 38 IUnit/L          N            



             TOTAL (test code =                                        



             ALKP)                                               



CBC W/AUTO [id number] 04:42:00





             Test Item    Value        Reference Range Interpretation Comments

 

             WHITE BLOOD CELL (test code = 15.0 x10 3/uL 4.5-11.0     H         

   



             WBC)                                                

 

             RED BLOOD CELL (test code = 3.28 x10 6/uL 4.00-5.60    L           

 



             RBC)                                                

 

             HEMOGLOBIN (test code = HGB) 9.6 g/dL     12.5-16.9    L           

 

 

             HEMATOCRIT (test code = HCT) 30.9 %       37.5-50.7    L           

 

 

             MEAN CELL VOLUME (test code = 94.2 fL      81.0-99.0    N          

  



             MCV)                                                

 

             MEAN CELL HGB (test code = 29.3 pg      27.0-33.0    N            



             MCH)                                                

 

             MEAN CELL HGB CONCETRATION 31.1 g/dL    33.0-37.0    L            



             (test code = MCHC)                                        

 

             RED CELL DISTRIBUTION WIDTH CV 14.2 %       11.5-14.5    N         

   



             (test code = RDW)                                        

 

             RED CELL DISTRIBUTION WIDTH SD 49.1 fL      37.0-54.0    N         

   



             (test code = RDW-SD)                                        

 

             PLATELET COUNT (test code = 114 x10 3/uL 150-400      L            



             PLT)                                                

 

             MEAN PLATELET VOLUME (test 11.6 fL      7.0-9.0      H            



             code = MPV)                                         

 

             NEUTROPHIL % (test code = NT%) 85.2 %       56.0-77.0    H         

   

 

             IMMATURE GRANULOCYTE % (test 1.0 %        0.0-2.0      N           

 



             code = IG%)                                         

 

             LYMPHOCYTE % (test code = LY%) 6.4 %        14.0-32.0    L         

   

 

             MONOCYTE % (test code = MO%) 7.1 %        4.8-9.0      N           

 

 

             EOSINOPHIL % (test code = EO%) 0.1 %        0.3-3.7      L         

   

 

             BASOPHIL % (test code = BA%) 0.2 %        0.0-2.0      N           

 

 

             NUCLEATED RBC % (test code = 0.0 %        0-0          N           

 



             NRBC%)                                              

 

             NEUTROPHIL # (test code = NT#) 12.74 x10 3/uL 2.0-7.6      H       

     

 

             IMMATURE GRANULOCYTE # (test 0.15 x10 3/uL 0.00-0.03    H          

  



             code = IG#)                                         

 

             LYMPHOCYTE # (test code = LY#) 0.96 x10 3/uL 1.0-3.8      L        

    

 

             MONOCYTE # (test code = MO#) 1.06 x10 3/uL 0.1-0.8      H          

  

 

             EOSINOPHIL # (test code = EO#) 0.02 x10 3/uL 0.0-0.2      N        

    

 

             BASOPHIL # (test code = BA#) 0.03 x10 3/uL 0.0-0.2      N          

  

 

             NUCLEATED RBC # (test code = 0.00 x10 3/uL 0.0-0.1      N          

  



             NRBC#)                                              

 

             MANUAL DIFF REQUIRED (test NO                                     



             code = MDIFF)                                        



- XR CHEST 1 -11-88 12:22:00
********************************[facility]        : 1950        Sex: 
M************************************************************ FAX: Reji Leger [phone]    [place]:   St: [facility] FAX: Aida Fernandez [phone]    FAX:         Hermelinda Galaviz [phone]   
------------------------------------------------------------------------------- 
 Name:   DEISI RICHTER                  [facility]                    
: 1950  Age/S: 71/M           [address] [place] [address]    Unit #: 
[id number]      [place] [address]                 Phys: 
Hermelinda Galaviz NP                                                Acct: 
[id number] Dis Date:               Status: ADM IN                             
    PHONE #: [phone]     Exam Date:     2021     1216                
   FAX #: [phone]     Reason: pulm edema?  EXAMS:                          
                     CPT CODE:      [id number] XR CHEST 1 V                      
[id number]                    EXAM: XR CHEST 1 VIEW               DATE: 2021 11:
37 AM : 1950; Age: 71 years  y/o Male               INDICATION: pulm 
edema?               COMPARISON: 2021               TECHNIQUE: AP chest.
                  IMPRESSION:    Lines, tubes and hardware: Prosthetic cardiac 
valve.                   Heart, mediastinum and lungs: Cardiomegaly is again 
seen.         Atherosclerotic changes are seen involving the aorta.  Mild hazy  
       opacities within bilateral lungs, which may represent edema.  Probable   
      trace right pleural effusion.  Right paratracheal fullness is again       
  seen.                   SL: [id number]                              ** 
Electronically Signed by NESTOR Anderson **            **           on 
2021 at 1222            **                      Reported and signed by: 
NESTOR Bailey               CC: Reji Muhammad MD; Aida Maynard MD;                     Hermelinda Galaviz NP                            
                   Technologist: Mica Dueñas RT(R)(M)                       
        Trnscrd Date/Time/By: 2021 (3252) : By: [id number]          Orig 
Print D/T: S: 2021 (8762)                         PAGE  1                 
      Signed ReportBASIC METABOLIC [id number] 10:58:00





             Test Item    Value        Reference Range Interpretation Comments

 

             SODIUM (test code = NA) 144 mEq/L    134-147      N            

 

             POTASSIUM (test code = 4.3 mEq/L    3.4-5.0      N            



             K)                                                  

 

             CHLORIDE (test code = 114 mEq/L    100-108      H            



             CL)                                                 

 

             CARBON DIOXIDE (test 23 mEq/l     21-33        N            



             code = CO2)                                         

 

             ANION GAP (test code = 12           0-20         N            



             GAP)                                                

 

             GLUCOSE (test code = 172 mg/dL           H            



             GLU)                                                

 

             BLOOD UREA NITROGEN 17 mg/dL     7-18         N            



             (test code = BUN)                                        

 

             GLOMERULAR FILTRATION 73.5         70-80        N            Units 

of measure =



             RATE (test code = GFR)                                        ml/mi

n/1.73 m2

 

             CREATININE (test code = 1.0 mg/dL    0.6-1.3      N            



             CREAT)                                              

 

             CALCIUM (test code = 7.6 mg/dL    8.0-10.5     L            



             CA)                                                 



CBC W/AUTO [id number] 10:42:00





             Test Item    Value        Reference Range Interpretation Comments

 

             WHITE BLOOD CELL (test code = 15.9 x10 3/uL 4.5-11.0     H         

   



             WBC)                                                

 

             RED BLOOD CELL (test code = 3.49 x10 6/uL 4.00-5.60    L           

 



             RBC)                                                

 

             HEMOGLOBIN (test code = HGB) 10.3 g/dL    12.5-16.9    L           

 

 

             HEMATOCRIT (test code = HCT) 33.4 %       37.5-50.7    L           

 

 

             MEAN CELL VOLUME (test code = 95.7 fL      81.0-99.0    N          

  



             MCV)                                                

 

             MEAN CELL HGB (test code = 29.5 pg      27.0-33.0    N            



             MCH)                                                

 

             MEAN CELL HGB CONCETRATION 30.8 g/dL    33.0-37.0    L            



             (test code = MCHC)                                        

 

             RED CELL DISTRIBUTION WIDTH CV 14.4 %       11.5-14.5    N         

   



             (test code = RDW)                                        

 

             RED CELL DISTRIBUTION WIDTH SD 50.2 fL      37.0-54.0    N         

   



             (test code = RDW-SD)                                        

 

             PLATELET COUNT (test code = 131 x10 3/uL 150-400      L            



             PLT)                                                

 

             MEAN PLATELET VOLUME (test 11.2 fL      7.0-9.0      H            



             code = MPV)                                         

 

             NEUTROPHIL % (test code = NT%) 89.0 %       56.0-77.0    H         

   

 

             IMMATURE GRANULOCYTE % (test 1.4 %        0.0-2.0      N           

 



             code = IG%)                                         

 

             LYMPHOCYTE % (test code = LY%) 4.1 %        14.0-32.0    L         

   

 

             MONOCYTE % (test code = MO%) 3.3 %        4.8-9.0      L           

 

 

             EOSINOPHIL % (test code = EO%) 1.6 %        0.3-3.7      N         

   

 

             BASOPHIL % (test code = BA%) 0.6 %        0.0-2.0      N           

 

 

             NUCLEATED RBC % (test code = 0.0 %        0-0          N           

 



             NRBC%)                                              

 

             NEUTROPHIL # (test code = NT#) 14.19 x10 3/uL 2.0-7.6      H       

     

 

             IMMATURE GRANULOCYTE # (test 0.22 x10 3/uL 0.00-0.03    H          

  



             code = IG#)                                         

 

             LYMPHOCYTE # (test code = LY#) 0.66 x10 3/uL 1.0-3.8      L        

    

 

             MONOCYTE # (test code = MO#) 0.53 x10 3/uL 0.1-0.8      N          

  

 

             EOSINOPHIL # (test code = EO#) 0.25 x10 3/uL 0.0-0.2      H        

    

 

             BASOPHIL # (test code = BA#) 0.09 x10 3/uL 0.0-0.2      N          

  

 

             NUCLEATED RBC # (test code = 0.00 x10 3/uL 0.0-0.1      N          

  



             NRBC#)                                              

 

             MANUAL DIFF REQUIRED (test NO                                     



             code = MDIFF)                                        



[id number] 09:35:00





             Test Item    Value        Reference Range Interpretation Comments

 

             ACT-ISTAT (test code 263 SEC             H            Perform

ed by certified



             = ACTI)                                              at  [facility]



POC VENOUS BLOOD [id number] 09:17:00





             Test Item    Value        Reference Range Interpretation Comments

 

             POC VENOUS BLOOD GAS PH (test 7.290        7.33-7.45    L          

  



             code = POCPHV)                                        

 

             POC VENOUS BLOOD GAS PCO2 (test 41.5 mmHg    43-47        L        

    



             code = [id number])                                        

 

             POC VENOUS BLOOD GAS PO2 (test 36.7 mmHG    10-50        N         

   



             code = IXZPL5I)                                        

 

             POC TCO2 VENOUS (test code = 21.3                                  

 



             [id number])                                           

 

             POC HCO3 VENOUS (test code = 20.0 MMOL/L  22-27        L           

 



             [id number])                                           

 

             POC BASE EXCESS VENOUS (test code -6.1 MMOL/L  -4.0-4.0     L      

      



             = POCBEV)                                           

 

             POC O2 SATURATION VENOUS (test 63.6 %       60-80        N         

   



             code = VYLO1PM)                                        



BASIC METABOLIC [id number] 09:17:00





             Test Item    Value        Reference Range Interpretation Comments

 

             SODIUM (test code = NA/ABG)  MEQ/L       134-147                   

 

             POTASSIUM (test code = K/ABG)  MEQ/L       3.4-5.0                 

  

 

             CHLORIDE (test code = CL/ABG)  MEQ/L       100-108                 

  

 

             CREATININE ABG (test code = CREAABG)  mg/dL       0.8-1.3          

         

 

             POC IONIZED CALCIUM (test code =  MMOL/L      1.12-1.32            

     



             POCCA)                                              

 

             POC GLUCOSE (test code = POCGLU)  MG/DL                      

     



[id number] 09:17:00





             Test Item    Value        Reference Range Interpretation Comments

 

             HEMOGLOBIN (test code = HGB/ABG)  G/DL        12.5-16.9            

     



[id number] 09:17:00





             Test Item    Value        Reference Range Interpretation Comments

 

             HEMATOCRIT (test code = HCT/ABG)  %           37.5-50.7            

     



POC LACTIC [id number] 09:17:00





             Test Item    Value        Reference Range Interpretation Comments

 

             POC LACTIC ACID (test code = 0.9 mmol/l   0.9-1.7      N           

 



             POCLAC)                                             



POC VENOUS BLOOD [id number] 09:17:00





             Test Item    Value        Reference Range Interpretation Comments

 

             POC VENOUS BLOOD GAS PH (test 7.290        7.33-7.45    L          

  



             code = POCPHV)                                        

 

             POC VENOUS BLOOD GAS PCO2 (test 41.5 mmHg    43-47        L        

    



             code = [id number])                                        

 

             POC VENOUS BLOOD GAS PO2 (test 36.7 mmHG    10-50        N         

   



             code = BZYAN1X)                                        

 

             POC TCO2 VENOUS (test code = 21.3                                  

 



             AUWUJD3E)                                           

 

             POC HCO3 VENOUS (test code = 20.0 MMOL/L  22-27        L           

 



             NRDSJU7U)                                           

 

             POC BASE EXCESS VENOUS (test code -6.1 MMOL/L  -4.0-4.0     L      

      



             = POCBEV)                                           

 

             POC O2 SATURATION VENOUS (test 63.6 %       60-80        N         

   



             code = OJLY9AA)                                        



BASIC METABOLIC [id number] 09:17:00





             Test Item    Value        Reference Range Interpretation Comments

 

             SODIUM (test code = NA/ABG) 151 MEQ/L    134-147      H            

 

             POTASSIUM (test code = K/ABG) 3.2 MEQ/L    3.4-5.0      L          

  

 

             CHLORIDE (test code = CL/ABG) 119 MEQ/L    100-108      H          

  

 

             CREATININE ABG (test code = 0.9 mg/dL    0.8-1.3      N            



             CREAABG)                                            

 

             POC IONIZED CALCIUM (test code = 0.72 MMOL/L  1.12-1.32    L       

     



             POCCA)                                              

 

             POC GLUCOSE (test code = POCGLU) 118 MG/DL           H       

     



[id number] 09:17:00





             Test Item    Value        Reference Range Interpretation Comments

 

             HEMOGLOBIN (test code = HGB/ABG)  G/DL        12.5-16.9            

     



[id number] 09:17:00





             Test Item    Value        Reference Range Interpretation Comments

 

             HEMATOCRIT (test code = HCT/ABG)  %           37.5-50.7            

     



POC LACTIC [id number] 09:17:00





             Test Item    Value        Reference Range Interpretation Comments

 

             POC LACTIC ACID (test code = 0.9 mmol/l   0.9-1.7      N           

 



             POCLAC)                                             



POC VENOUS BLOOD [id number] 09:17:00





             Test Item    Value        Reference Range Interpretation Comments

 

             POC VENOUS BLOOD GAS PH (test 7.290        7.33-7.45    L          

  



             code = POCPHV)                                        

 

             POC VENOUS BLOOD GAS PCO2 (test 41.5 mmHg    43-47        L        

    



             code = TXUIDP4L)                                        

 

             POC VENOUS BLOOD GAS PO2 (test 36.7 mmHG    10-50        N         

   



             code = YOOQO5X)                                        

 

             POC TCO2 VENOUS (test code = 21.3                                  

 



             ALKEGJ6X)                                           

 

             POC HCO3 VENOUS (test code = 20.0 MMOL/L  22-27        L           

 



             MQDURK9Y)                                           

 

             POC BASE EXCESS VENOUS (test code -6.1 MMOL/L  -4.0-4.0     L      

      



             = POCBEV)                                           

 

             POC O2 SATURATION VENOUS (test 63.6 %       60-80        N         

   



             code = RSNY1DJ)                                        



BASIC METABOLIC [id number] 09:17:00





             Test Item    Value        Reference Range Interpretation Comments

 

             SODIUM (test code = NA/ABG) 151 MEQ/L    134-147      H            

 

             POTASSIUM (test code = K/ABG) 3.2 MEQ/L    3.4-5.0      L          

  

 

             CHLORIDE (test code = CL/ABG) 119 MEQ/L    100-108      H          

  

 

             CREATININE ABG (test code = 0.9 mg/dL    0.8-1.3      N            



             CREAABG)                                            

 

             POC IONIZED CALCIUM (test code = 0.72 MMOL/L  1.12-1.32    L       

     



             POCCA)                                              

 

             POC GLUCOSE (test code = POCGLU) 118 MG/DL           H       

     



[id number] 09:17:00





             Test Item    Value        Reference Range Interpretation Comments

 

             HEMOGLOBIN (test code = HGB/ABG) 8.0 G/DL     12.5-16.9    L       

     



[id number] 09:17:00





             Test Item    Value        Reference Range Interpretation Comments

 

             HEMATOCRIT (test code = HCT/ABG)  %           37.5-50.7            

     



POC LACTIC [id number] 09:17:00





             Test Item    Value        Reference Range Interpretation Comments

 

             POC LACTIC ACID (test code = 0.9 mmol/l   0.9-1.7      N           

 



             POCLAC)                                             



POC VENOUS BLOOD [id number] 09:17:00





             Test Item    Value        Reference Range Interpretation Comments

 

             POC VENOUS BLOOD GAS PH (test 7.290        7.33-7.45    L          

  



             code = POCPHV)                                        

 

             POC VENOUS BLOOD GAS PCO2 (test 41.5 mmHg    43-47        L        

    



             code = [id number])                                        

 

             POC VENOUS BLOOD GAS PO2 (test 36.7 mmHG    10-50        N         

   



             code = KGLUS4W)                                        

 

             POC TCO2 VENOUS (test code = 21.3                                  

 



             OSVSGP6O)                                           

 

             POC HCO3 VENOUS (test code = 20.0 MMOL/L  22-27        L           

 



             CYRJHL9L)                                           

 

             POC BASE EXCESS VENOUS (test code -6.1 MMOL/L  -4.0-4.0     L      

      



             = POCBEV)                                           

 

             POC O2 SATURATION VENOUS (test 63.6 %       60-80        N         

   



             code = GZBT8XO)                                        



BASIC METABOLIC [id number] 09:17:00





             Test Item    Value        Reference Range Interpretation Comments

 

             SODIUM (test code = NA/ABG) 151 MEQ/L    134-147      H            

 

             POTASSIUM (test code = K/ABG) 3.2 MEQ/L    3.4-5.0      L          

  

 

             CHLORIDE (test code = CL/ABG) 119 MEQ/L    100-108      H          

  

 

             CREATININE ABG (test code = 0.9 mg/dL    0.8-1.3      N            



             CREAABG)                                            

 

             POC IONIZED CALCIUM (test code = 0.72 MMOL/L  1.12-1.32    L       

     



             POCCA)                                              

 

             POC GLUCOSE (test code = POCGLU) 118 MG/DL           H       

     



[id number] 09:17:00





             Test Item    Value        Reference Range Interpretation Comments

 

             HEMOGLOBIN (test code = HGB/ABG) 8.0 G/DL     12.5-16.9    L       

     



[id number] 09:17:00





             Test Item    Value        Reference Range Interpretation Comments

 

             HEMATOCRIT (test code = HCT/ABG) 23 %         37.5-50.7    L       

     



POC LACTIC [id number] 09:17:00





             Test Item    Value        Reference Range Interpretation Comments

 

             POC LACTIC ACID (test code = 0.9 mmol/l   0.9-1.7      N           

 



             POCLAC)                                             



POC VENOUS BLOOD [id number] 09:17:00





             Test Item    Value        Reference Range Interpretation Comments

 

             POC VENOUS BLOOD GAS PH (test 7.290        7.33-7.45    L          

  



             code = POCPHV)                                        

 

             POC VENOUS BLOOD GAS PCO2 (test 41.5 mmHg    43-47        L        

    



             code = QGGMJZ6N)                                        

 

             POC VENOUS BLOOD GAS PO2 (test 36.7 mmHG    10-50        N         

   



             code = FRBZE9S)                                        

 

             POC TCO2 VENOUS (test code = 21.3                                  

 



             WKDBVA0J)                                           

 

             POC HCO3 VENOUS (test code = 20.0 MMOL/L  22-27        L           

 



             AICCPV2N)                                           

 

             POC BASE EXCESS VENOUS (test code -6.1 MMOL/L  -4.0-4.0     L      

      



             = POCBEV)                                           

 

             POC O2 SATURATION VENOUS (test 63.6 %       60-80        N         

   



             code = LCYI4EQ)                                        



BASIC METABOLIC [id number] 09:17:00





             Test Item    Value        Reference Range Interpretation Comments

 

             SODIUM (test code = NA/ABG)  MEQ/L       134-147                   

 

             POTASSIUM (test code = K/ABG)  MEQ/L       3.4-5.0                 

  

 

             CHLORIDE (test code = CL/ABG)  MEQ/L       100-108                 

  

 

             CREATININE ABG (test code = CREAABG)  mg/dL       0.8-1.3          

         

 

             POC IONIZED CALCIUM (test code =  MMOL/L      1.12-1.32            

     



             POCCA)                                              

 

             POC GLUCOSE (test code = POCGLU)  MG/DL                      

     



[id number] 09:17:00





             Test Item    Value        Reference Range Interpretation Comments

 

             HEMOGLOBIN (test code = HGB/ABG)  G/DL        12.5-16.9            

     



[id number] 09:17:00





             Test Item    Value        Reference Range Interpretation Comments

 

             HEMATOCRIT (test code = HCT/ABG)  %           37.5-50.7            

     



POC LACTIC [id number] 09:17:00





             Test Item    Value        Reference Range Interpretation Comments

 

             POC LACTIC ACID (test code = POCLAC)  mmol/l      0.9-1.7          

         



COVID 19 Asymptomatic IH [id number] 13:23:00





             Test Item    Value        Reference Range Interpretation Comments

 

             COVID 19 Asymptomatic Negative     Negative                  A nega

tive result is



             IH AG (test code =                                        presumpti

ve and should



             COVNONPUIAG)                                        be confirmedwit

h an FDA



                                                                 authorized mole

cular



                                                                 assay, if neces

nancy



                                                                 forpatient autumn

gement.A



                                                                 positive result

 does not



                                                                 rule out co-inf

ections



                                                                 withother patho

gens.This



                                                                 test detects pj

th viable



                                                                 (live) and



                                                                 non-viable,SARS

-CoV, and



                                                                 SARS-CoV-2. Alexis

t



                                                                 performance dep

ends on



                                                                 theamount of vi

jennifer



                                                                 (antigen) in th

e



                                                                 sample.This alexis

t has not



                                                                 been FDA cleare

d or



                                                                 approved; the t

est



                                                                 hasbeen authori

zed by



                                                                 FDA under an Em

ergency



                                                                 Use Authorizati

on(EUA)



                                                                 for use by labo

ratories



                                                                 certified under

 the CLIA



                                                                 thatmeet the



                                                                 requirements to

 perform



                                                                 moderate, high 

or



                                                                 waivedcomplexit

y tests.



B-TYPE NATRIURETIC [id number] 11:27:00





             Test Item    Value        Reference Range Interpretation Comments

 

             B-TYPE NATRIURETIC PEPTIDE (test 700.0 PG/ML  0-100        H       

     



             code = BNP)                                         



PROTHROMBIN [id number] 11:26:00





             Test Item    Value        Reference Range Interpretation Comments

 

             PROTHROMBIN TIME 12.7 SECONDS 9.3-12.9     N            



             PATIENT (test code =                                        



             PTP)                                                

 

             INTERNATIONAL NORMAL 1.2          0.8-1.2      N                   

         TARGET



             RATIO (test code =                                        INR BY IN

DICATION



             INR)                                                Indication



                                                                 



                                                                   INR1. Prophyl

axis of



                                                                 venous thrombos

is



                                                                        2.0 - 3.

0



                                                                 (orthopedic cande

amaya),



                                                                 Prophylaxis of



                                                                 venous thrombos

is



                                                                 (other than hig

h-risk



                                                                  surgery), Mary

tment



                                                                 of Deep Vein



                                                                 Thrombosis/Pulm

onary



                                                                 Embolism, Preve

ntion



                                                                 of systemic emb

olism -



                                                                 Tissue heart va

lves,



                                                                 Acute Myocardia

l



                                                                 Infarction (to 

prevent



                                                                   systemic embo

lism),



                                                                 Valvular heart



                                                                 disease,   Atri

al



                                                                 Fibrillation,



                                                                 Bileaflet mecha

nical



                                                                 valve in aortic



                                                                 position.2. Mec

hanical



                                                                 prosthetic valv

es



                                                                 (high risk),   

 2.5 -



                                                                 3.5   Presence 

of



                                                                 Lupus Anticoagu

lant or



                                                                   Antiphospholi

pid



                                                                 Antibodies, Pre

vention



                                                                 of   systemic e

mbolism



                                                                 - Acute Myocard

ial



                                                                 Infarction   (t

o



                                                                 prevent recurre

nt



                                                                 infarct).



THROMBOPLASTIN TIME [id number] 11:26:00





             Test Item    Value        Reference Range Interpretation Comments

 

             THROMBOPLASTIN TIME 35.9 Seconds 25.0-39.5    N                Ther

apeutic



             PARTIAL (test code =                                        Range: 

50.4 - 88.3



             PTT)                                                Seconds        

****



                                                                 Effective



                                                                 2019 ****



BASIC METABOLIC [id number] 11:25:00





             Test Item    Value        Reference Range Interpretation Comments

 

             SODIUM (test code = NA) 145 mEq/L    134-147      N            

 

             POTASSIUM (test code = 3.9 mEq/L    3.4-5.0      N            



             K)                                                  

 

             CHLORIDE (test code = 111 mEq/L    100-108      H            



             CL)                                                 

 

             CARBON DIOXIDE (test 26 mEq/l     21-33        N            



             code = CO2)                                         

 

             ANION GAP (test code = 12           0-20         N            



             GAP)                                                

 

             GLUCOSE (test code = 123 mg/dL           H            



             GLU)                                                

 

             BLOOD UREA NITROGEN 18 mg/dL     7-18         N            



             (test code = BUN)                                        

 

             GLOMERULAR FILTRATION 59.5         70-80        L            Units 

of measure =



             RATE (test code = GFR)                                        ml/mi

n/1.73 m2

 

             CREATININE (test code = 1.2 mg/dL    0.6-1.3      N            



             CREAT)                                              

 

             CALCIUM (test code = 8.9 mg/dL    8.0-10.5     N            



             CA)                                                 



[id number] 11:25:00





             Test Item    Value        Reference Range Interpretation Comments

 

             ALBUMIN (test code = ALB) 3.90 g/dL    3.4-5.0      N            



- XR CHEST 2 -30-68 11:16:00
************************************************************[facility]        : 1950        Sex: 
M************************************************************ FAX: Reji Leger [phone]    [place]:   St: PRE FAX: Aida Fernandez [phone]   
------------------------------------------------------------------------------- 
 Name:   DEISI RICHTER                  [facility]                    
: 1950 Age/S: 71/M           [address] [place] [address]         Unit #: 
[id number]      Loc: [place] [address]                 Phys: 
Reji Muhammad MD   Acct: [id number] Dis Date:               Status: 
PRE SDC                                PHONE #: [phone]     Exam Date:     
2021     1104                   FAX #: [phone]     Reason: PREOP     
                                         EXAMS:     CPT CODE:      [id number] XR 
CHEST 2 V                               [id number]                    CHEST 
RADIOGRAPHS - PA AND LATERAL:                COMPARISON: May 20, 2021           
    CLINICAL HISTORY: PREOP               Moderate cardiomegaly noted.          
     Mild vascular/interstitial prominence is seen. No focal infiltrates       
noted.               There is mild thickening of the fissures. No evidence of 
pneumothorax.                 IMPRESSION:                    Cardiomegaly.      
   Mild vascular/interstitial prominence. The possibility mild early         
vascular congestion is raised.          ** Electronically Signed by RADHA Quiñones on 2021 at 1116 **             Reported and signed by: Jeffy Quiñones M.D.                    CC: Reji Muhammad MD; Aida Maynard MD   
          Technologist: RT Darlene(WESLY)                                  
Trnscrd Date/Time/By: 2021 (1116) : By: [email or web address]          Orig Print 
D/T: S: 2021 (5988)       PAGE  1                       Signed ReportCBC 
W/AUTO [id number] 11:13:00





             Test Item    Value        Reference Range Interpretation Comments

 

             WHITE BLOOD CELL (test code =  x10 3/uL    4.5-11.0                

  



             WBC)                                                

 

             RED BLOOD CELL (test code = RBC)  x10 6/uL    4.00-5.60            

     

 

             HEMOGLOBIN (test code = HGB)  g/dL        12.5-16.9                

 

 

             HEMATOCRIT (test code = HCT)  %           37.5-50.7                

 

 

             MEAN CELL VOLUME (test code =  fL          81.0-99.0               

  



             MCV)                                                

 

             MEAN CELL HGB (test code = MCH)  pg          27.0-33.0             

    

 

             MEAN CELL HGB CONCETRATION (test  g/dL        33.0-37.0            

     



             code = MCHC)                                        

 

             RED CELL DISTRIBUTION WIDTH CV  %           11.5-14.5              

   



             (test code = RDW)                                        

 

             PLATELET COUNT (test code = PLT) 162 x10 3/uL 150-400      N       

     

 

             NEUTROPHIL % (test code = NT%)  %           56.0-77.0              

   

 

             LYMPHOCYTE % (test code = LY%)  %           14.0-32.0              

   

 

             NEUTROPHIL # (test code = NT#)  x10 3/uL    2.0-7.6                

   

 

             LYMPHOCYTE # (test code = LY#)  x10 3/uL    1.0-3.8                

   

 

             MANUAL DIFF REQUIRED (test code                                    

    



             = MDIFF)                                            



CBC W/AUTO [id number] 11:13:00





             Test Item    Value        Reference Range Interpretation Comments

 

             WHITE BLOOD CELL (test code = 10.1 x10 3/uL 4.5-11.0     N         

   



             WBC)                                                

 

             RED BLOOD CELL (test code = 4.02 x10 6/uL 4.00-5.60    N           

 



             RBC)                                                

 

             HEMOGLOBIN (test code = HGB) 11.8 g/dL    12.5-16.9    L           

 

 

             HEMATOCRIT (test code = HCT) 37.4 %       37.5-50.7    L           

 

 

             MEAN CELL VOLUME (test code = 93.0 fL      81.0-99.0    N          

  



             MCV)                                                

 

             MEAN CELL HGB (test code = MCH) 29.4 pg      27.0-33.0    N        

    

 

             MEAN CELL HGB CONCETRATION 31.6 g/dL    33.0-37.0    L            



             (test code = MCHC)                                        

 

             RED CELL DISTRIBUTION WIDTH CV 14.3 %       11.5-14.5    N         

   



             (test code = RDW)                                        

 

             RED CELL DISTRIBUTION WIDTH SD 49.3 fL      37.0-54.0    N         

   



             (test code = RDW-SD)                                        

 

             PLATELET COUNT (test code = 162 x10 3/uL 150-400      N            



             PLT)                                                

 

             MEAN PLATELET VOLUME (test code 11.9 fL      7.0-9.0      H        

    



             = MPV)                                              

 

             NEUTROPHIL % (test code = NT%) 72.1 %       56.0-77.0    N         

   

 

             IMMATURE GRANULOCYTE % (test 1.3 %        0.0-2.0      N           

 



             code = IG%)                                         

 

             LYMPHOCYTE % (test code = LY%) 11.9 %       14.0-32.0    L         

   

 

             MONOCYTE % (test code = MO%) 7.4 %        4.8-9.0      N           

 

 

             EOSINOPHIL % (test code = EO%) 6.1 %        0.3-3.7      H         

   

 

             BASOPHIL % (test code = BA%) 1.2 %        0.0-2.0      N           

 

 

             NUCLEATED RBC % (test code = 0.0 %        0-0          N           

 



             NRBC%)                                              

 

             NEUTROPHIL # (test code = NT#) 7.26 x10 3/uL 2.0-7.6      N        

    

 

             IMMATURE GRANULOCYTE # (test 0.13 x10 3/uL 0.00-0.03    H          

  



             code = IG#)                                         

 

             LYMPHOCYTE # (test code = LY#) 1.20 x10 3/uL 1.0-3.8      N        

    

 

             MONOCYTE # (test code = MO#) 0.75 x10 3/uL 0.1-0.8      N          

  

 

             EOSINOPHIL # (test code = EO#) 0.61 x10 3/uL 0.0-0.2      H        

    

 

             BASOPHIL # (test code = BA#) 0.12 x10 3/uL 0.0-0.2      N          

  

 

             NUCLEATED RBC # (test code = 0.00 x10 3/uL 0.0-0.1      N          

  



             NRBC#)                                              

 

             MANUAL DIFF REQUIRED (test code NO                                 

    



             = MDIFF)                                            



- CTA ABD PEL W [id number] 15:58:00
************************************************************[facility]        : 1950        Sex: 
M************************************************************  Name: 
DEISI RICHTER                   [facility]                    : 
1950 Age/S: 70  / M         [address] [place] [address]         Unit #: 
[id number]     Loc:               [place] [address]                 Phys: 
Dana Mosqueda  FN     Acct: [id number]  Dis Date:               Status: REG 
CLI                                 PHONE #: [phone]     Exam Date: 
2021  1346                     FAX #: [phone]      Reason: 135.0, 
AORTIC STENOSIS.                             EXAMS:      CPT CODE:      
[id number] CTA ABD PEL W CONT                         [address]88                    CH
EST, ABDOMEN AND PELVIS CT ANGIOGRAM WITH AND WITHOUT IV CONTRAST       WITH 
MULTIPLANAR REFORMATS AND 3D RECONSTRUCTIONS (TAVR PROTOCOL).                   
   DATE: 2021 12:29 PM : 1950; Age: 70 years  y/o Male             
  INDICATION: Aortic stenosis               ADMINISTERED CONTRAST: 100 mL of 
Isovue 300 intravenously.       DLP: 2238 mGy-cm                      CT imaging
 performed at this location utilizes radiation dose       optimization 
techniques which include one or more ofthe following:       -Automated exposure 
control       -Adjustment of the mA and/or kV according to patient size       -
Use of iterative reconstruction technique               FINDINGS: Contiguous 0.5
 mm axial images of the chest, abdomen and       pelvis were obtained with IV 
contrast using the CT angiogram protocol.       The acquired data was used to 
create 5 mm axial reconstructions       coronalreformatted images and 3-D 
reconstructions with the use of the       Workstation.    CHEST: Estimated 
aortic diameters are as follows:               Aortic annulus: 3.3 cm       Aort
ic root: 4.3 cm       Sinotubular junction: 3.9 cm       Right cusp height (to 
RCA takeoff): 1.8 cm     Left cusp height (to Left main takeoff): 1.5 cm       
Mid ascending aorta: 4.1 cm       Mid transverse arch: 3.1 cm       Descending 
thoracic aorta at the level of the pulmonary arteries: 3 cm           Mild to 
moderate cardiomegaly with coronary arteries calcification.        No 
pericardialeffusion.  Moderate aortic root calcification.  Minimal       
scattered thoracic aortic calcifications.  Ectatic pulmonary arteries.       
Multiple mediastinal nodes measuring up to 2.1 cm in the pretracheal       
region.  No pleural effusion.  Minimal pulmonary emphysema with       
peribronchiolar thickening.  No consolidation 4 mm noncalcified nodule       in 
the left upper lobe on image 26 of series 4.  6 mm noncalcified       nodule in 
the left upper lobe on image 28.                        ABDOMEN: Estimated 
aortoiliac/iliofemoral arterial diameters are as       follows:             PAGE
  1                   Signed Report                    (CONTINUED)   Name: 
DEISI RICHTER     [facility]                    : 1950 Age/S: 70
  / M         [address] [place] [address] Blvd         Unit #: [id number]     Loc:       
        [place] [address]                 Phys: Dana Mosqueda  KATLYN             
                                    Acct: [id number]  Dis Date:  Status: REG 
CLI                                 PHONE #: [phone]     Exam Date: 
2021  134                     FAX #: [phone]      Reason: 135.0, 
AORTIC STENOSIS.       EXAMS:                                               CPT 
CODE:      [id number] CTA ABD PEL W CONT                         20957           
    &lt;Continued&gt;        Abdominal aorta just below the renal arteries: 2.1 
cm       Distal abdominal aorta at iliac bifurcation: 1.9 cm       Right common 
iliac artery: 1.4 cm       Left common iliac artery: 1.3 cm       Right common 
femoral artery: 1.2 cm       Left common femoral artery: 1.1 cm                
Mild atherosclerotic changes at the originof the celiac artery.  SMA       is 
grossly patent. Atherosclerotic changes are seen involving the    aorta.  
Detailed evaluation of the renal arteries is limited due to       streak 
artifacts.  Mild to moderate atherosclerotic changes are       grossly noted 
involving the proximal left renal artery.  Mild to       moderate 
atherosclerotic calcifications involving the bilateral common       iliac a
rteries with left more than right.  Moderate focal stenosis at       the origin 
of the left externaliliac artery without occlusion.        Patent right external
 iliac artery.       Streak artifacts limits detailed evaluation of the abdomen 
and pelvis.        Liver, gallbladder, adrenal glands, kidneys, spleen and 
pancreas       appears grossly unremarkable.  Small hiatal hernia.  No bowel    
   obstruction.  Moderate sigmoid colon diverticulosis. Bladder is not       
well distended limiting its evaluation.  Left femoral hardware       partially 
seen.  Moderate to severe lumbar spine degenerative changes.        No acute 
fracture.                           IMPRESSION:           1. Mild to moderate
cardiomegaly with coronary artery disease and         probable pulmonary 
hypertension.           2. Moderate aortic root calcification and minimal 
thoracic aortic         calcifications.  Aneurysmal ascending thoracic aorta.   
      3. Nonspecific mediastinal lymphadenopathy measuring up to 2.1 cm.     4. 
Detailed evaluation of abdomen and pelvis is limited due to streak         
artifacts.  Thereappears to be moderate focal stenosis at the origin         of 
the left external iliac artery without occlusion.         5. Moderate sigmoid 
colon diverticulosis.         6.  Few noncalcified lung nodules measuring up to 
6 mm.  If the         patient has a history of smoking or other risk factor to 
increase         their risk of malignancy, then follow-up CT at 6-12 months and 
18-24         months isrecommended.  If the patient has no such risk factors, 
then         follow-up chest CT recommended at 6-12 months with consideration 
for         CT at 18-24 months.***                     ***Reference:Guidelines 
for Management of Incidental Pulmonary         Nodules Detected on CT Images: 
From the Fleischner Society 2017.               PAGE  2                       
Signed Report                    (CONTINUED)   Name: DEISI RICHTER            
       [facility]                    : 1950 Age/S: 70  / M        
 [address] [place], [place] [address]vd         Unit #: [id number]     Loc:               [place] [address]                 Phys: Dana Mosqueda     Acct: [id number]
  Dis Date:               Status: REG CLI                                 PHONE 
#: [phone]     Exam Date: 2021  1344                     FAX #: 
[phone]      Reason: 135.0, AORTIC STENOSIS.                             
EXAMS:      CPT CODE:      [id number] CTA ABD PEL W CONT                         
507[address]               &lt;Continued&gt;          *******************FOR INTERNAL 
CODING PURPOSES ONLY****************         RESULT CODE: NOD                   
 ** Electronically Signed by NESTOR Anderson **            **        on 
2021 at 1558            **                      Reported and signed by: 
Kira Anderson D.O.                                   CC: Dana Mosqueda; 
Reji Muhammad MD                                                         
                         Technologist:RT Christy(R)            CTDI:     
   DLP:        Trnscb Date/Time: 2021 () LiMP37                   
Orig Print D/T: S: 2021 (789)      PAGE  3                       Signed 
Report- CT ANGIO [id number] 15:58:00
************************************************************[facility]        : 1950        Sex: 
M************************************************************  Name: 
DEISI RICHTER                   [facility]                    : 
1950 Age/S: 70  / M         [address] [place] [address] Blvd         Unit #: 
[id number]     Loc:               [place] [address]                 Phys: 
Dana Mosqueda     Acct: [id number]  Dis Date:               Status: REG 
CLI                                 PHONE #: [phone]     Exam Date: 
2021  1346                     FAX #: [phone]      Reason: 135.0 , 
AORTIC STENOSIS.                            EXAMS:      CPT CODE:      [id number]
 CT ANGIO CHEST                             [address]                    CHEST, 
ABDOMEN AND PELVIS CT ANGIOGRAM WITH AND WITHOUT IV CONTRAST       WITH 
MULTIPLANAR REFORMATS AND 3D RECONSTRUCTIONS (TAVR PROTOCOL).                   
   DATE: 2021 12:29 PM : 1950; Age: 70 years  y/o Male             
  INDICATION: Aortic stenosis               ADMINISTERED CONTRAST: 100 mL of 
Isovue 300 intravenously.       DLP: 2238 mGy-cm                      CT imaging
 performed at this location utilizes radiation dose       optimization 
techniques which include one or more ofthe following:       -Automated exposure 
control       -Adjustment of the mA and/or kV according to patient size       -
Use of iterative reconstruction technique               FINDINGS: Contiguous 0.5
 mm axial images of the chest, abdomen and       pelvis were obtained with IV 
contrast using the CT angiogram protocol.       The acquired data was used to 
create 5 mm axial reconstructions       coronalreformatted images and 3-D 
reconstructions with the use of the       Workstation.    CHEST: Estimated 
aortic diameters are as follows:               Aortic annulus: 3.3 cm       Aort
ic root: 4.3 cm       Sinotubular junction: 3.9 cm       Right cusp height (to 
RCA takeoff): 1.8 cm     Left cusp height (to Left main takeoff): 1.5 cm       
Mid ascending aorta: 4.1 cm       Mid transverse arch: 3.1 cm       Descending 
thoracic aorta at the level of the pulmonary arteries: 3 cm           Mild to 
moderate cardiomegaly with coronary arteries calcification.        No 
pericardialeffusion.  Moderate aortic root calcification.  Minimal       
scattered thoracic aortic calcifications.  Ectatic pulmonary arteries.       
Multiple mediastinal nodes measuring up to 2.1 cm in the pretracheal       
region.  No pleural effusion.  Minimal pulmonary emphysema with       
peribronchiolar thickening.  No consolidation 4 mm noncalcified nodule       in 
the left upper lobe on image 26 of series 4.  6 mm noncalcified       nodule in 
the left upper lobe on image 28.                        ABDOMEN: Estimated 
aortoiliac/iliofemoral arterial diameters are as       follows:             PAGE
  1                   Signed Report                    (CONTINUED)   Name: 
DEISI RICHTER     [facility]                    : 1950 Age/S: 70
  / M         [address] [place] [address]         Unit #: [id number]     Loc:       
        [place] [address]                 Phys: Dana Mosqueda  KATLYN             
                                    Acct: [id number]  Dis Date:  Status: REG 
CLI                                 PHONE #: [phone]     Exam Date: 
2021  1347                     FAX #: [phone]      Reason: 135.0 , 
AORTIC STENOSIS.       EXAMS:                                               CPT 
CODE:      [id number] CT ANGIO CHEST                           [address]             
  &lt;Continued&gt;        Abdominal aorta just below the renal arteries: 2.1 cm
       Distal abdominal aorta at iliac bifurcation: 1.9 cm       Right common 
iliac artery: 1.4 cm       Left common iliac artery: 1.3 cm       Right common 
femoral artery: 1.2 cm       Left common femoral artery: 1.1 cm                
Mild atherosclerotic changes at the originof the celiac artery.  SMA       is 
grossly patent. Atherosclerotic changes are seen involving the    aorta.  
Detailed evaluation of the renal arteries is limited due to       streak 
artifacts.  Mild to moderate atherosclerotic changes are       grossly noted 
involving the proximal left renal artery.  Mild to       moderate 
atherosclerotic calcifications involving the bilateral common       iliac a
rteries with left more than right.  Moderate focal stenosis at       the origin 
of the left externaliliac artery without occlusion.        Patent right external
 iliac artery.       Streak artifacts limits detailed evaluation of the abdomen 
and pelvis.        Liver, gallbladder, adrenal glands, kidneys, spleen and 
pancreas       appears grossly unremarkable.  Small hiatal hernia.  No bowel    
   obstruction.  Moderate sigmoid colon diverticulosis. Bladder is not       
well distended limiting its evaluation.  Left femoral hardware       partially 
seen.  Moderate to severe lumbar spine degenerative changes.        No acute 
fracture.                           IMPRESSION:           1. Mild to moderate
cardiomegaly with coronary artery disease and         probable pulmonary 
hypertension.           2. Moderate aortic root calcification and minimal 
thoracic aortic         calcifications.  Aneurysmal ascending thoracic aorta.   
      3. Nonspecific mediastinal lymphadenopathy measuring up to 2.1 cm.     4. 
Detailed evaluation of abdomen and pelvis is limited due to streak         
artifacts.  Thereappears to be moderate focal stenosis at the origin         of 
the left external iliac artery without occlusion.         5. Moderate sigmoid 
colon diverticulosis.         6.  Few noncalcified lung nodules measuring up to 
6 mm.  If the         patient has a history of smoking or other risk factor to 
increase         their risk of malignancy, then follow-up CT at 6-12 months and 
18-24         months isrecommended.  If the patient has no such risk factors, 
then         follow-up chest CT recommended at 6-12 months with consideration 
for         CT at 18-24 months.***                     ***Reference:Guidelines 
for Management of Incidental Pulmonary         Nodules Detected on CT Images: 
From the Fleischner Society 2017.               PAGE  2                       
Signed Report                    (CONTINUED)   Name: DEISI RICHTER            
       [facility]                    : 1950 Age/S: 70  / M        
 [address] [place] [address]         Unit #: [id number]     Loc:               [place] [address]                 Phys: Dana Mosqueda     Acct: [id number]
  Dis Date:               Status: REG CLI                                 PHONE 
#: [phone]     Exam Date: 2021  1346                     FAX #: 
[phone]      Reason: 135.0 , AORTIC STENOSIS.                            
EXAMS:      CPT CODE:      [id number] CT ANGIO CHEST                             
[id number]               &lt;Continued&gt;          *******************FOR INTERNAL 
CODING PURPOSES ONLY****************         RESULT CODE: NOD                   
 ** Electronically Signed by NESTOR Anderson **            **        on 
2021 at 1558            **                      Reported and signed by: 
Kira Anderson D.O.                                   CC: Dana Mosqueda; 
Reji Muhammad MD                                                         
                         Technologist:RT Christy(R)            CTDI:     
   DLP:        Trnscb Date/Time: 2021 () t.SDR.MP37                   
Orig Print D/T: S: 2021 (3240)      PAGE  3                       Signed 
Report- CTA HEART W CN [id number] 15:58:00
************************************************************[facility]        : 1950        Sex: 
M************************************************************  Name: 
DEISI RICHTER                   [facility]                    : 
1950 Age/S: 70  / M         [address] [place] [address] Blvd         Unit #: 
[id number]     Loc:               [place] [address]                 Phys: 
Dana Mosqueda     Acct: [id number]  Dis Date:               Status: REG 
CLI                                 PHONE #: [phone]     Exam Date: 
2021  1346                     FAX #: [phone]      Reason:           
                                          EXAMS:      CPT CODE:      [id number] 
CTA HEART W CN ART/GRAFTS                  [id number]                    CHEST, 
ABDOMEN AND PELVIS CT ANGIOGRAM WITH AND WITHOUT IV CONTRAST       WITH 
MULTIPLANAR REFORMATS AND 3D RECONSTRUCTIONS (TAVR PROTOCOL).                   
   DATE: 2021 12:29 PM : 1950; Age: 70 years  y/o Male             
  INDICATION: Aortic stenosis               ADMINISTERED CONTRAST: 100 mL of 
Isovue 300 intravenously.       DLP: 2238 mGy-cm                      CT imaging
 performed at this location utilizes radiation dose       optimization 
techniques which include one or more ofthe following:       -Automated exposure 
control       -Adjustment of the mA and/or kV according to patient size       -
Use of iterative reconstruction technique               FINDINGS: Contiguous 0.5
 mm axial images of the chest, abdomen and       pelvis were obtained with IV 
contrast using the CT angiogram protocol.       The acquired data was used to 
create 5 mm axial reconstructions       coronalreformatted images and 3-D 
reconstructions with the use of the       Workstation.    CHEST: Estimated 
aortic diameters are as follows:               Aortic annulus: 3.3 cm       Aort
ic root: 4.3 cm       Sinotubular junction: 3.9 cm       Right cusp height (to 
RCA takeoff): 1.8 cm     Left cusp height (to Left main takeoff): 1.5 cm       
Mid ascending aorta: 4.1 cm       Mid transverse arch: 3.1 cm       Descending 
thoracic aorta at the level of the pulmonary arteries: 3 cm           Mild to 
moderate cardiomegaly with coronary arteries calcification.        No 
pericardialeffusion.  Moderate aortic root calcification.  Minimal       
scattered thoracic aortic calcifications.  Ectatic pulmonary arteries.       
Multiple mediastinal nodes measuring up to 2.1 cm in the pretracheal       
region.  No pleural effusion.  Minimal pulmonary emphysema with       
peribronchiolar thickening.  No consolidation 4 mm noncalcified nodule       in 
the left upper lobe on image 26 of series 4.  6 mm noncalcified       nodule in 
the left upper lobe on image 28.                        ABDOMEN: Estimated 
aortoiliac/iliofemoral arterial diameters are as       follows:             PAGE
  1                   Signed Report                    (CONTINUED)   Name: 
DEISI RICHTER     [facility]                    : 1950 Age/S: 70
  / M         [address] [place], [place] [address]vd         Unit #: [id number]     Loc:       
        [place] [address]                 Phys: Dana Mosqueda  FN             
                                    Acct: [id number]  Dis Date:  Status: REG 
CLI                                 PHONE #: [phone]     Exam Date: 
2021  1346                     FAX #: [phone]      Reason:       
EXAMS:                                               CPT CODE:      [id number] 
CTA HEART W CN ART/GRAFTS                  [id number]               &lt;Continued&gt;
        Abdominal aorta just below the renal arteries: 2.1 cm       Distal 
abdominal aorta at iliac bifurcation: 1.9 cm       Right common iliac artery: 
1.4 cm       Left common iliac artery: 1.3 cm       Right common femoral artery:
 1.2 cm       Left common femoral artery: 1.1 cm                Mild 
atherosclerotic changes at the originof the celiac artery.  SMA       is grossly
 patent. Atherosclerotic changes are seen involving the    aorta.  Detailed 
evaluation of the renal arteries is limited due to       streak artifacts.  Mild
 to moderate atherosclerotic changes are       grossly noted involving the 
proximal left renal artery.  Mild to       moderate atherosclerotic 
calcifications involving the bilateral common       iliac arteries with left 
more than right.  Moderate focal stenosis at       the origin of the left 
externaliliac artery without occlusion.        Patent right external iliac 
artery.       Streak artifacts limits detailed evaluation of the abdomen and 
pelvis.        Liver, gallbladder, adrenal glands, kidneys, spleen and pancreas 
      appears grossly unremarkable.  Small hiatal hernia.  No bowel       obstr
uction.  Moderate sigmoid colon diverticulosis. Bladder is not       well 
distended limiting its evaluation.  Left femoral hardware       partially seen. 
 Moderate to severe lumbar spine degenerative changes.        No acute fracture.
                           IMPRESSION:           1. Mild to moderatecardiomegaly
 with coronary artery disease and         probable pulmonary hypertension.      
     2. Moderate aortic root calcification and minimal thoracic aortic         
calcifications.  Aneurysmal ascending thoracic aorta.         3. Nonspecific 
mediastinal lymphadenopathy measuring up to 2.1 cm.     4. Detailed evaluation 
of abdomen and pelvis is limited due to streak         artifacts.  Thereappears 
to be moderate focal stenosis at the origin         of the left external iliac 
artery without occlusion.         5. Moderate sigmoid colon diverticulosis.     
    6.  Few noncalcified lung nodules measuring up to 6 mm.  If the         
patient has a history of smoking or other risk factor to increase         their 
risk of malignancy, then follow-up CT at 6-12 months and 18-24         months is
recommended.  If the patient has no such risk factors, then         follow-up 
chest CT recommended at 6-12 months with consideration for         CT at 18-24 
months.***                     ***Reference:Guidelines for Management of 
Incidental Pulmonary         Nodules Detected on CT Images: From the Fleischner 
Society 2017.               PAGE  2                       Signed Report         
           (CONTINUED)   Name: DEISI RICHTER                   [facility]
                    : 1950 Age/S: 70  / M         [address] [place] [address] 
Bl         Unit #: [id number]     Loc:               [place] [address]        
         Phys: Dana Mosqueda     Acct: [id number]  Dis Date:            
   Status: REG CLI                                 PHONE #: [phone]     
Exam Date: 2021  1346                     FAX #: [phone]      Reason:
                                                     EXAMS:      CPT CODE:      
[id number] CTA HEART W CN ART/GRAFTS                  13099               &lt;Con
tinued&gt;          *******************FOR INTERNAL CODING PURPOSES 
ONLY****************         RESULT CODE: NOD                    ** 
Electronically Signed by NESTOR Anderson **            **        on 
2021 at 1558            **                      Reported and signed by: 
Kira Anderson D.O.                                   CC: Dana Muhammad MD                                                         
                         Technologist:Dimitry Sanchez, RT(R)            CTDI:     
   DLP:        Trnscb Date/Time: 2021 (1558) t.SDR.MP37                   
Orig Print D/T: S: 2021 (8503)      PAGE  3                       Signed 
ReportCOVID 19 Asymptomatic IH [id number] 12:30:00





             Test Item    Value        Reference Range Interpretation Comments

 

             COVID 19 Asymptomatic Negative     Negative                  A nega

tive result is



             IH AG (test code =                                        presumpti

ve and should



             COVNONPUIAG)                                        be confirmedwit

h an FDA



                                                                 authorized mole

cular



                                                                 assay, if neces

nancy



                                                                 forpatient autumn

gement.A



                                                                 positive result

 does not



                                                                 rule out co-inf

ections



                                                                 withother patho

gens.This



                                                                 test detects pj

th viable



                                                                 (live) and



                                                                 non-viable,SARS

-CoV, and



                                                                 SARS-CoV-2. Alexis

t



                                                                 performance dep

ends on



                                                                 theamount of vi

jennifer



                                                                 (antigen) in th

e



                                                                 sample.This alexis

t has not



                                                                 been FDA cleare

d or



                                                                 approved; the t

est



                                                                 hasbeen authori

zed by



                                                                 FDA under an Em

ergency



                                                                 Use Authorizati

on(EUA)



                                                                 for use by labo

ratories



                                                                 certified under

 the CLIA



                                                                 thatmeet the



                                                                 requirements to

 perform



                                                                 moderate, high 

or



                                                                 waivedcomplexit

y tests.

## 2022-03-03 NOTE — ER
Nurse's Notes                                                                                     

 [facility]                                                                 

Name: Santino Ma                                                                               

Age: 71 yrs                                                                                       

Sex: Male                                                                                         

: 1950                                                                                   

MRN: [id number]                                                                                   

Arrival Date: 2022                                                                          

Time: 07:33                                                                                       

Account#: [id number]                                                                            

Bed 4                                                                                             

Private MD: Gaston Ruiz B                                                                     

Diagnosis: Coronavirus infection, unspecified;Pneumonia due to SARS-associated                    

  coronavirus;Hypoxemia;Obesity, unspecified;Combined systolic (congestive) and                   

  diastolic (congestive) heart failure                                                            

                                                                                                  

Presentation:                                                                                     

                                                                                             

07:42 Chief complaint: Patient states: SOB and low O2 started last night (65-75% at home).    ll1 

      Was admitted here 3 weeks ago for covid. Followed up with his doctor who took him off       

      home O2. Oxygen 82-88% on 4 L NC. No fever. Coronavirus screen: Vaccine status: Patient     

      reports being unvaccinated. Client denies travel out of the [place] in the last 14 days.       

      difficulty breathing, fatigue, shortness of breath, Client presents with at least one       

      sign or symptom that may indicate coronavirus-19. Standard/surgical mask placed on the      

      client. Ebola Screen: Patient denies travel to an Ebola-affected area in the 21 days        

      before illness onset. Initial Sepsis Screen: Does the patient meet any 2 criteria? RR >     

      20 per min. HR > 90 bpm. Yes Does the patient have a suspected source of infection?         

      Yes: Productive cough/pneumonia. Risk Assessment: Do you want to hurt yourself or           

      someone else? Patient reports no desire to harm self or others. Onset of symptoms was       

      2022.                                                                              

07:42 Method Of Arrival: Wheelchair                                                           ll1 

07:42 Acuity: YASHIRA 2                                                                           ll1 

07:55 Initial Sepsis Screen: Does the patient meet any 2 criteria?.                           ph  

                                                                                                  

Triage Assessment:                                                                                

07:45 General: Appears uncomfortable, ill, Behavior is cooperative, appropriate for age.      ll1 

      Pain: Denies pain. Neuro: No deficits noted. Cardiovascular: No deficits noted.             

      Respiratory: Reports shortness of breath labored breathing Airway is patent Trachea         

      midline Respiratory effort is labored, Respiratory pattern is tachypnea the patient has     

      severe shortness of breath.                                                                 

                                                                                                  

Historical:                                                                                       

- Allergies:                                                                                      

07:41 PENICILLINS;                                                                            ll1 

15:12 codeine;                                                                                tw2 

- Home Meds:                                                                                      

09:00 amiodarone 200 mg Oral tab 1 tab 2 times per day [Active]; furosemide 40 mg Oral tab 1  tw2 

      tab 2 times per day [Active]; pantoprazole 40 mg oral grps 1 packet 2 times per day         

      [Active]; rosuvastatin 40 mg oral cpSP 1 cap once daily [Active]; Xarelto 20 mg oral        

      tab 1 tab once daily [Active]; metoprolol tartrate 50 mg Oral tab 1 tab 2 times per day     

      [Active];                                                                                   

- PMHx:                                                                                           

07:41 Atrial Fib; Hyperlipidemia; Hypertension; Myocardial infarction;                        ll1 

- PSHx:                                                                                           

15:12 pacemaker;                                                                              tw2 

                                                                                                  

- Immunization history:: Client reports having NOT received the Covid vaccine.                    

- Social history:: Smoking status: Patient/guardian denies using tobacco, the patient             

  reports quitting approximately 15 years ago.                                                    

                                                                                                  

                                                                                                  

Screenin:55 Abuse screen: Denies threats or abuse. Nutritional screening: No deficits noted.        tw2 

      Tuberculosis screening: No symptoms or risk factors identified. Fall Risk None              

      identified.                                                                                 

                                                                                                  

Assessment:                                                                                       

07:45 General: Appears in no apparent distress. obese, Behavior is calm, cooperative,         tw2 

      appropriate for age. Pain: Denies pain. Neuro: Level of Consciousness is awake, alert,      

      obeys commands, Oriented to person, place, time, situation. Cardiovascular: Capillary       

      refill < 3 seconds. Cardiovascular: Edema is 2+ to left midcalf and right midcalf.          

      Respiratory: Reports shortness of breath at rest on exertion Airway is patent               

      Respiratory effort is even, labored, using tripod position. GI: Abdomen is round obese.     

      Musculoskeletal: Range of motion: intact in all extremities.                                

08:55 Reassessment: hospitalist at bedside at this time.                                      tw2 

09:02 Reassessment: pt in CT at this.                                                         tw2 

11:19 Reassessment: Patient appears in no apparent distress at this time. Patient and/or      tw2 

      family updated on plan of care and expected duration. Pain level reassessed.                

13:10 Reassessment: Patient appears in no apparent distress at this time. Patient and/or      tw2 

      family updated on plan of care and expected duration. Pain level reassessed.                

                                                                                                  

Vital Signs:                                                                                      

07:42  / 80; Pulse 120; Resp 30; Temp 99.3; Pulse Ox 94% on 4 lpm NC; Weight 134.72 kg; ll1 

      Height 5 ft. 10 in. (177.80 cm); Pain 0/10;                                                 

08:54 BP 92 / 62; Pulse 114; Resp 27; Pulse Ox 92% on 4 lpm NC;                               tw2 

09:31  / 48; Pulse 59; Resp 20; Pulse Ox 100% on 4 lpm NC;                              tw2 

10:23  / 75; Pulse 107; Resp 30; Pulse Ox 91% on 6 lpm NC;                              tw2 

11:15  / 74; Pulse 104; Resp 29; Pulse Ox 95% on Airvo - high flow o2. 20L/min, 65%;    tw2 

12:09  / 67; Pulse 107; Resp 29; Pulse Ox 93% on Nebulizer Mask;                        tw2 

13:10  / 65; Pulse 108; Resp 24; Pulse Ox 96% ;                                         tw2 

14:12 BP 98 / 67; Pulse 116; Resp 28; Pulse Ox 96% ;                                          tw2 

15:05  / 69; Pulse 106; Resp 24; Pulse Ox 96% ;                                         tw2 

07:42 Body Mass Index 42.61 (134.72 kg, 177.80 cm)                                            ll1 

10:23 provider notified of pts RR and increased WOB. RT paged for BIPAP at this time.         tw2 

13:10 HIGH FLOW                                                                               tw2 

14:12 hiflow mask                                                                             tw2 

15:05 hiflow                                                                                  tw2 

                                                                                                  

ED Course:                                                                                        

07:33 Patient arrived in ED.                                                                  mr  

07:33 Gaston Ruiz MD is Private Physician.                                                mr  

07:37 Eze Elmore MD is Attending Physician.                                             shelby 

07:40 Arm band placed on Patient placed in an exam room, on a stretcher.                      ll1 

07:45 Triage completed.                                                                       ll1 

07:55 Shaina Hernandez RN is Primary Nurse.                                                        tw2 

07:56 Bed in low position. Call light in reach. Cardiac monitor on. Pulse ox on. NIBP on.     tw2 

08:08 Initial lab(s) drawn, by me, sent to lab. First set of blood cultures drawn. Inserted   kj1 

      saline lock: 20 gauge in right antecubital area, using aseptic technique. Blood             

      collected.                                                                                  

08:08 EKG done, by ED staff, reviewed by Eez Elmore MD COVID swab sent to lab. Flu and/or kj1 

      RSV swab sent to lab.                                                                       

08:12 XRAY Chest (1 view) In Process Unspecified.                                             EDMS

08:35 Prince Jaquez MD is Hospitalizing Provider.                                          shelby 

09:05 Procalcitonin Sent.                                                                     tw2 

09:16 CT Chest For PE Angio In Process Unspecified.                                           EDMS

13:13 Awaiting: unsuccessful attempt to call report at this time.                             tw2 

14:05 Awaiting: unsuccessful attempt to call report per unit secretary she forgot to talk     tw2 

      with charge nurse, placed on hold, when she returned was told that charge nurse LIO Solano they need a little while longer before they will take report.                            

15:08 No provider procedures requiring assistance completed. Patient admitted, IV remains in  tw2 

      place.                                                                                      

15:19 Report given to LIO Solano.                                                              tw2 

                                                                                                  

Administered Medications:                                                                         

08:13 Not Given (Duplicate Order): NS 0.9% 1000 ml IV at 125 ml/hr continuous                 shelby 

08:15 Drug: SOLU-Medrol (methylPrednisoLONE) 125 mg Route: IVP; Site: right antecubital;      tw2 

09:53 Follow up: Response: No adverse reaction                                                tw2 

08:18 Drug: Pepcid (famotidine) 20 mg Route: IVP; Site: right antecubital;                    tw2 

09:52 Follow up: Response: No adverse reaction                                                tw2 

08:23 Not Given (Duplicate Order): Zithromax (azithromycin) 500 mg IVPB once over 1 hrs; mix  shelby 

      in 250 mL NS                                                                                

08:25 Drug: Rocephin (cefTRIAXone) 1 grams Route: IV; Rate: per protocol; Site: right         tw2 

      antecubital;                                                                                

08:30 Follow up: Response: No adverse reaction; IV Status: Completed infusion; IV Intake: [id number] 

09:50 Drug: Lasix (furosemide) 40 mg Route: IVP; Site: right antecubital;                     tw2 

10:47 Follow up: Response: No adverse reaction                                                tw2 

11:13 Drug: Lasix (furosemide) 20 mg Route: IVP; Site: right antecubital;                     tw2 

12:08 Follow up: Response: No adverse reaction                                                tw2 

12:15 Drug: Budesonide 0.25 mg/2 mL 0.5 mg Route: Inhalation;                                 tw2 

                                                                                                  

                                                                                                  

Intake:                                                                                           

08:30 IV: 10ml; Total: 10ml.                                                                  tw2 

                                                                                                  

Output:                                                                                           

12:09 Urine: 350ml (Figueroa); Total: 350ml.                                                     tw2 

15:15 Urine: 150ml (Figueroa); Total: 500ml.                                                     tw2 

                                                                                                  

Outcome:                                                                                          

08:36 Decision to Hospitalize by Provider.                                                    shelby 

15:19 Admitted to Med/surg accompanied by tech, via stretcher, room 216.                      tw2 

15:19 Condition: stable                                                                           

15:19 Instructed on the need for admit.                                                           

15:35 Patient left the ED.                                                                    tw2 

                                                                                                  

Signatures:                                                                                       

Dispatcher [employer]                           Eze Sena MD MD cha Rivera, Danielle                                                                                    

Ivis Li, RN                      RN   Shaina Nicholson RN                          RN   tw2                                                  

Xiomara Sorensen Lynsay, RN                       RN   ll1                                                  

                                                                                                  

Corrections: (The following items were deleted from the chart)                                    

1515 07:41 PSHx: None; ll1                                                                   tw2 

                                                                                                  

**************************************************************************************************

## 2022-03-03 NOTE — RAD REPORT
EXAM DESCRIPTION:  CT - Chest For Pe Angio - 3/3/2022 9:16 am

 

CLINICAL HISTORY:  Chest pain.

DYSPNEA

 

COMPARISON:  [facility] Con dated 2/11/2022

 

TECHNIQUE:  CT angiogram of the pulmonary arteries was performed with MIP.

 

All CT scans are performed using dose optimization technique as appropriate and may include automated
 exposure control or mA/KV adjustment according to patient size.

 

FINDINGS:  No evidence of pulmonary thromboembolism.

 

Thoracic aorta is suboptimally contrast opacified for assessment. Cardiomegaly.

 

Extensive bilateral airspace disease is present, greater on the left.

 

Small bilateral pleural effusions. Moderate hiatal hernia.

 

No concerning bony finding.

 

IMPRESSION:  No evidence of pulmonary thromboembolism.

 

Extensive bilateral airspace disease is present, greater on the left. The findings may be related to 
CHF or pneumonia.

## 2022-03-04 LAB
BUN BLD-MCNC: 42 MG/DL (ref 7–18)
CRP SERPL-MCNC: 171 MG/L (ref ?–3)
GLUCOSE SERPLBLD-MCNC: 165 MG/DL (ref 74–106)
HCT VFR BLD CALC: 33.1 % (ref 39.6–49)
LYMPHOCYTES # SPEC AUTO: 0.5 K/UL (ref 0.7–4.9)
MAGNESIUM SERPL-MCNC: 2.6 MG/DL (ref 1.8–2.4)
PMV BLD: 10.5 FL (ref 7.6–11.3)
POTASSIUM SERPL-SCNC: 4.2 MMOL/L (ref 3.5–5.1)
RBC # BLD: 3.77 M/UL (ref 4.33–5.43)

## 2022-03-04 RX ADMIN — DOXYCYCLINE SCH MG: 100 CAPSULE ORAL at 20:46

## 2022-03-04 RX ADMIN — CLOPIDOGREL BISULFATE SCH MG: 75 TABLET, FILM COATED ORAL at 08:06

## 2022-03-04 RX ADMIN — SODIUM CHLORIDE SCH MG: 0.9 INJECTION, SOLUTION INTRAVENOUS at 08:06

## 2022-03-04 RX ADMIN — CEFUROXIME AXETIL SCH MG: 250 TABLET, FILM COATED ORAL at 20:46

## 2022-03-04 RX ADMIN — ACETAMINOPHEN PRN MG: 325 TABLET ORAL at 14:09

## 2022-03-04 RX ADMIN — METHYLPREDNISOLONE SODIUM SUCCINATE SCH MG: 40 INJECTION, POWDER, FOR SOLUTION INTRAMUSCULAR; INTRAVENOUS at 00:06

## 2022-03-04 RX ADMIN — AMIODARONE HYDROCHLORIDE SCH MG: 200 TABLET ORAL at 08:06

## 2022-03-04 RX ADMIN — ALBUMIN (HUMAN) SCH MG: 5 SOLUTION INTRAVENOUS at 05:08

## 2022-03-04 RX ADMIN — ACETAMINOPHEN PRN MG: 325 TABLET ORAL at 19:31

## 2022-03-04 RX ADMIN — METOPROLOL TARTRATE SCH MG: 50 TABLET, FILM COATED ORAL at 08:06

## 2022-03-04 RX ADMIN — MELATONIN SCH MG: 3 TAB ORAL at 20:47

## 2022-03-04 RX ADMIN — METHYLPREDNISOLONE SODIUM SUCCINATE SCH MG: 40 INJECTION, POWDER, FOR SOLUTION INTRAMUSCULAR; INTRAVENOUS at 17:57

## 2022-03-04 RX ADMIN — HEPARIN SODIUM SCH: 5000 INJECTION, SOLUTION INTRAVENOUS; SUBCUTANEOUS at 01:00

## 2022-03-04 RX ADMIN — ROSUVASTATIN CALCIUM SCH MG: 10 TABLET, COATED ORAL at 20:46

## 2022-03-04 RX ADMIN — RIVAROXABAN SCH MG: 20 TABLET, FILM COATED ORAL at 08:07

## 2022-03-04 RX ADMIN — METHYLPREDNISOLONE SODIUM SUCCINATE SCH MG: 40 INJECTION, POWDER, FOR SOLUTION INTRAMUSCULAR; INTRAVENOUS at 05:08

## 2022-03-04 RX ADMIN — LEVOFLOXACIN SCH MLS: 5 INJECTION, SOLUTION INTRAVENOUS at 09:43

## 2022-03-04 RX ADMIN — AMIODARONE HYDROCHLORIDE SCH MG: 200 TABLET ORAL at 20:46

## 2022-03-04 NOTE — EKG
Test Date:    2022-03-03               Test Time:    07:57:48

Technician:   ODIN                                   

                                                     

MEASUREMENT RESULTS:                                       

Intervals:                                           

Rate:         121                                    

OK:                                                  

QRSD:         178                                    

QT:           398                                    

QTc:          565                                    

Axis:                                                

P:                                                   

OK:                                                  

QRS:          -27                                    

T:            151                                    

                                                     

INTERPRETIVE STATEMENTS:                                       

                                                     

Wide QRS tachycardia

Left bundle branch block

Abnormal ECG

Compared to ECG 02/09/2022 22:26:32

Wide-QRS tachycardia now present

Left bundle-branch block now present

Ventricular-paced complex(es) or rhythm no longer present



Electronically Signed On 03-04-22 11:29:33 CST by Ricci Giang

## 2022-03-04 NOTE — P.PN
Subjective


Date of Service: 03/04/22


Chief Complaint: Shortness of breath


Subjective: Improving (Patient was transferred to the ICU yesterday due to acute

hypoxemic restaurant failure.  Is doing much better on BiPAP.  He underwent 

cardioversion today for A. fib.)





Physical Examination





- Vital Signs


Temperature: 96.9 F


Blood Pressure: 94/58


Pulse: 112


Respirations: 31


Pulse Ox (%): 92





- Physical Exam


General: Cooperative, Other (Patient is more alert and awake today.  Speaking in

full sentences)


HEENT: Atraumatic, Normocephalic


Neck: Supple


Respiratory: Other (BiPAP machine on)


Cardiovascular: No edema, Normal S1 S2, Irregular heart rate/rhythm


Neurological: Normal speech, Normal affect





Assessment And Plan





- Current Problems (Diagnosis)


(1) Acute hypoxemic respiratory failure due to COVID-19


Current Visit: Yes   Status: Acute   





(2) Acute exacerbation of CHF (congestive heart failure)


Current Visit: Yes   Status: Acute   





(3) Atrial fibrillation with RVR


Current Visit: No   Status: Acute   





(4) CAD (coronary artery disease)


Current Visit: No   Status: Chronic   


Qualifiers: 


   Coronary Disease-Associated Artery/Lesion type: native artery   Native vs. 

transplanted heart: native heart   Associated angina: without angina   Qualified

Code(s): I25.10 - Atherosclerotic heart disease of native coronary artery 

without angina pectoris   





(5) Hyperlipidemia


Current Visit: No   Status: Chronic   


Qualifiers: 


   Hyperlipidemia type: unspecified   Qualified Code(s): E78.5 - Hyperlipidemia,

unspecified   





(6) Hypertension


Current Visit: No   Status: Chronic   


Qualifiers: 


   Hypertension type: essential hypertension 


Physician Review Additional Text: 





Assessment


Patient is a 71-year-old  male with a past medical history of 

congestive heart failure, pulmonary hypertension, atrial fibrillation status 

post watchman procedure, and TAVR.  He returned to the hospital complaining of 

shortness of breath and lower extremity edema.  He is being treated with a 

working diagnosis of Covid pneumonia with superimposed CHF exacerbation.  He was

admitted here in mid February for a similar presentation.  He has received 40 mg

of IV Lasix in the ER and Solu-Medrol.





Acute hypoxemic respiratory failure


COVID-19 pneumonia


Acute on chronic CHF exacerbation


Wide-complex tachycardia


Acute kidney failure


Pacemaker in place





Plan: 


She underwent he underwent cardioversion today.  Depending on progress, will 

decide whether to discharge home or transfer to [place] for pacemaker 


continue amiodarone, beta-blockers and Xarelto


Continue Solu-Medrol 40 mg IV every 6 hours


Antibiotics switched to doxycycline and cefuroxime.  Bacterial pneumonia is 

unlikely especially with negative procalcitonin


His symptoms are most likely due to decompensated heart failure complicated by 

A. fib


Continue diuresis


GI prophylaxis with pantoprazole, DVT prophylaxis with heparin subcu








03/04/22 12:55





03/04/22 12:56

## 2022-03-04 NOTE — P.CNS
Date of Consult: 03/04/22


Reason for Consult: Respiratory failure


Chief Complaint: Shortness of breath


History of Present Illness: 


Patient is 71 years of age well-known to me multiple medical problems including 

CHF atrial fibrillation is already had a watchman procedure Zentz with worsening

dyspnea I last saw him on Wednesday in my office not require any oxygen or 

steroid came acutely worse chest x-ray had worsened history of COVID mated and 

diuresed doing much better he is going to be cardioverted today/denies any fever

or chills





Allergies





Penicillins Allergy (Mild, Verified 04/28/21 10:14)


   Hives/Rash





Home Medications: 








Pantoprazole [Protonix Tab*] 40 mg PO DAILYAC #30 tab 12/24/16 


Rosuvastatin [Crestor*] 40 mg PO BEDTIME 02/02/17 


Furosemide [Lasix*] 40 mg PO DAILY PRN #30 tab 02/03/17 


Clopidogrel Bisulfate [Plavix*] 75 mg PO DAILY #30 tablet 12/09/19 


Amiodarone HCl [Pacerone] 200 mg PO BID 30 Days #60 tab 02/14/22 


Metoprolol Tartrate [Lopressor*] 50 mg PO BID 30 Days #60 tab 02/14/22 


Rivaroxaban [Xarelto] 20 mg PO DAILY 30 Days #30 tab 02/14/22 








- Past Medical/Surgical History


Diabetic: No


-: HTN


-: hyperlipidemia


-: atrial fibrillation


-: GERD


-: MI


-: Obstructive sleep apnea


-: BILATERAL KNEE REPLACEMENT


-: RIGHT CAROTID SURGERY


-: BILATERAL HAND SURGERY TO RELEASE LITTLE FINGERS


-: MERLE IN LEFT FEMUR


-: cardiac stent


-: pacemaker


-: watchman 


-: aortic valve replacement 





- Family History


  ** Mother


Medical History: Heart disease





  ** Brother


Medical History: Heart disease, Stroke





- Social History


Smoking Status: Unknown if ever smoked


Alcohol use: No


CD- Drugs: No


Caffeine use: Yes


Place of Residence: Home





Review of Systems


General: Weakness


Respiratory: Shortness of Breath


Cardiovascular: Edema





Physical Examination














Temp Pulse Resp BP Pulse Ox


 


 96.9 F   115 H  22 H  111/65   98 


 


 03/04/22 08:00  03/04/22 09:00  03/04/22 09:00  03/04/22 09:00  03/04/22 09:00








General: Alert, In no apparent distress, Oriented x3


HEENT: Atraumatic


Respiratory: Crackles/rales (Ankles left greater than the right)


Cardiovascular: No edema


Gastrointestinal: Normal bowel sounds, Soft and benign





- Problems


(1) Respiratory failure


Current Visit: Yes   Status: Acute   


Plan: 


Patient is 71 years of age story of CAROLYN de los santos admitted with worsening respiratory 

failure chest x-ray shows worsening opacification left greater than right likely

 CHF continue with aggressive diuresis as tolerated doubt sepsis avoid 

fluoroquinolones due to the interaction with amiodarone resulting in a prolonged

 QT interval instead add doxycycline


Qualifiers: 


   Chronicity: acute on chronic

## 2022-03-05 LAB
BUN BLD-MCNC: 75 MG/DL (ref 7–18)
BUN BLD-MCNC: 95 MG/DL (ref 7–18)
GLUCOSE SERPLBLD-MCNC: 118 MG/DL (ref 74–106)
GLUCOSE SERPLBLD-MCNC: 150 MG/DL (ref 74–106)
HCT VFR BLD CALC: 35.4 % (ref 39.6–49)
LYMPHOCYTES # SPEC AUTO: 0.6 K/UL (ref 0.7–4.9)
MORPHOLOGY BLD-IMP: (no result)
PMV BLD: 10.9 FL (ref 7.6–11.3)
POTASSIUM SERPL-SCNC: 5.3 MMOL/L (ref 3.5–5.1)
POTASSIUM SERPL-SCNC: 5.4 MMOL/L (ref 3.5–5.1)
RBC # BLD: 3.94 M/UL (ref 4.33–5.43)

## 2022-03-05 PROCEDURE — 5A2204Z RESTORATION OF CARDIAC RHYTHM, SINGLE: ICD-10-PCS

## 2022-03-05 RX ADMIN — METHYLPREDNISOLONE SODIUM SUCCINATE SCH MG: 40 INJECTION, POWDER, FOR SOLUTION INTRAMUSCULAR; INTRAVENOUS at 00:33

## 2022-03-05 RX ADMIN — CEFUROXIME AXETIL SCH MG: 250 TABLET, FILM COATED ORAL at 08:55

## 2022-03-05 RX ADMIN — METHYLPREDNISOLONE SODIUM SUCCINATE SCH MG: 40 INJECTION, POWDER, FOR SOLUTION INTRAMUSCULAR; INTRAVENOUS at 20:02

## 2022-03-05 RX ADMIN — ALBUMIN (HUMAN) SCH MLS: 5 SOLUTION INTRAVENOUS at 16:07

## 2022-03-05 RX ADMIN — SODIUM BICARBONATE TAB 325 MG SCH MG: 325 TAB at 15:16

## 2022-03-05 RX ADMIN — METOPROLOL TARTRATE SCH MG: 25 TABLET ORAL at 19:44

## 2022-03-05 RX ADMIN — CLOPIDOGREL BISULFATE SCH MG: 75 TABLET, FILM COATED ORAL at 08:55

## 2022-03-05 RX ADMIN — METHYLPREDNISOLONE SODIUM SUCCINATE SCH MG: 40 INJECTION, POWDER, FOR SOLUTION INTRAMUSCULAR; INTRAVENOUS at 08:55

## 2022-03-05 RX ADMIN — DOXYCYCLINE SCH MG: 100 CAPSULE ORAL at 09:22

## 2022-03-05 RX ADMIN — METOPROLOL TARTRATE SCH MG: 25 TABLET ORAL at 09:12

## 2022-03-05 RX ADMIN — CEFUROXIME AXETIL SCH MG: 250 TABLET, FILM COATED ORAL at 20:08

## 2022-03-05 RX ADMIN — MIDODRINE HYDROCHLORIDE SCH MG: 5 TABLET ORAL at 22:15

## 2022-03-05 RX ADMIN — MIDODRINE HYDROCHLORIDE SCH MG: 5 TABLET ORAL at 15:16

## 2022-03-05 RX ADMIN — SODIUM BICARBONATE TAB 325 MG SCH MG: 325 TAB at 22:15

## 2022-03-05 RX ADMIN — MORPHINE SULFATE PRN MG: 2 INJECTION, SOLUTION INTRAMUSCULAR; INTRAVENOUS at 19:44

## 2022-03-05 RX ADMIN — ROSUVASTATIN CALCIUM SCH MG: 10 TABLET, COATED ORAL at 20:03

## 2022-03-05 RX ADMIN — ALBUMIN (HUMAN) SCH MG: 5 SOLUTION INTRAVENOUS at 04:21

## 2022-03-05 RX ADMIN — DOXYCYCLINE SCH MG: 100 CAPSULE ORAL at 20:08

## 2022-03-05 RX ADMIN — ZOLPIDEM TARTRATE SCH MG: 5 TABLET, FILM COATED ORAL at 20:02

## 2022-03-05 RX ADMIN — RIVAROXABAN SCH MG: 20 TABLET, FILM COATED ORAL at 08:55

## 2022-03-05 NOTE — RAD REPORT
EXAM DESCRIPTION:  RAD - Chest Single View - 3/5/2022 8:26 am

 

CLINICAL HISTORY:  hypoxia

 

COMPARISON:  Chest Single View dated 3/3/2022; Chest Single View dated 3/3/2022; Chest Single View da
jose francisco 2/14/2022; Chest Single View dated 2/13/2022Chest Single View dated 3/3/2022; Chest Single View d
ated 3/3/2022; Chest Single View dated 2/14/2022; Chest Single View dated 2/13/2022; Chest For Pe Ang
io dated 3/3/2022

 

FINDINGS:  Lines: Pacemaker.

Lungs: Widespread pulmonary edema with overall little significant change.

Pleural: No significant pleural effusions or pneumothorax.

Cardiac: Cardiomegaly.

Bones: No acute fractures.

Other:

 

IMPRESSION:  Unchanged bilateral pulmonary opacities probably representing pulmonary edema and/or pne
umonia.

## 2022-03-05 NOTE — RAD REPORT
EXAM DESCRIPTION:  US - Renal Ultrasound-Complete - 3/5/2022 5:07 pm

 

CLINICAL HISTORY:  ARF

 

COMPARISON:  Renal Ultrasound-Complete dated 2/10/2022

 

FINDINGS:  Both kidneys are normal in size, shape and echotexture.

 

The right kidney measures 10.8 cm. No hydronephrosis, focal mass or perinephric fluid.

 

The left kidney measures 10 cm. No hydronephrosis, focal mass or perinephric fluid.

 

IMPRESSION:  No evidence of hydronephrosis. Previously noted right-sided renal calculus not seen whic
h may be related to difficulty in positioning the patient for optimal imaging windows .

## 2022-03-05 NOTE — OP
Surgeon:  Ricci Giang MD



Procedure:  Direct current cardioversion.



Indication:  Atrial fibrillation that failed IV amiodarone therapy.  The patient was sedated with 5 m
g of Versed IV push.  He received 200 joules of synchronized cardioversion and he converted to a pace
d rhythm.  There were no complications.



Final Diagnosis:  Successful cardioversion of atrial fibrillation to a paced rhythm after 1 shock of 
200 joules.



Plan:  We will continue present regimen, convert him to p.o. amiodarone.  Continue diuresis.  He may 
need to have his pacemaker modified down the road.  I will discuss the case further with Dr. Mena. 
 This may have to be done as an outpatient.





NB/ROCHELLE

DD:  03/05/2022 16:19:31Voice ID:  [id number]

DT:  03/05/2022 19:38:18Report ID:  [id number]

## 2022-03-05 NOTE — P.PN
Subjective


Date of Service: 03/05/22


Chief Complaint: Respiratory failure





Patient is not doing well despite being cardioverted still got diffuse 

interstitial changes on BiPAP possibility of amiodarone toxicity will does not 

feel good cannot take a deep breath and





Review of Systems


General: Weakness


Respiratory: Shortness of Breath





Physical Examination





- Vital Signs


Temperature: 97.4 F


Blood Pressure: 107/49


Pulse: 76


Respirations: 30


Pulse Ox (%): 94





- Physical Exam


General: Alert, Moderate distress


Respiratory: Crackles/rales


Cardiovascular: Edema, Irregular heart rate/rhythm





Assessment And Plan





- Current Problems (Diagnosis)


(1) Respiratory failure


Current Visit: Yes   Status: Acute   


Plan: 


Respiratory failure patient is not doing well sitter amiodarone toxicity 

amiodarone has been discontinued hemodynamically stable continue with other 

present medication including steroids and antibiotics for now saturation 

satisfactory on BiPAP renal function is worsened significantly DC IV Lasix 

recent ultrasound did not show any evidence of hydronephrosis start on low-dose 

IV fluids x-ray still shows bilateral interstitial changes white count is 

elevated


Qualifiers: 


   Chronicity: acute on chronic

## 2022-03-05 NOTE — PN
Date of Progress Note:  03/04/2022



Subjective:  Mr. Ma had come in with congestive heart failure.  He was admitted to Dr. Jaquez.  
He saw Dr. Mena yesterday.  He is rather very complicated.  He has a history of TAVR after which he
 had complete heart block requiring a pacemaker.  He has a history of Watchman and congestive heart f
ailure, recent COVID, history of coronary artery disease, status post stent.  He has paroxysmal atria
l fibrillation.  Came in with hypoxia and worsening congestive heart failure, rapid atrial fibrillati
on.  He was placed on IV amiodarone, metoprolol.  He was also on Lasix, heparin, antibiotics, Crestor
, and Protonix.  His atrial fibrillation remained at a rapid rate at about 140-150 and I decided that
 we would do a cardioversion on him today.





NB/ROCHELLE

DD:  03/05/2022 16:17:34Voice ID:  [id number]

DT:  03/05/2022 20:17:29Report ID:  [id number]

## 2022-03-05 NOTE — P.CNS
Date of Consult: 03/05/22


Reason for Consult: elevated creatinine 


Requesting Physician: Prince HUMPHREY Jaquez


Chief Complaint: Respiratory failure


History of Present Illness: 





72-year-old  male past medical history of hypertension, A. fib status 

post watchman device,  on chronic anticoagulation , recent Covid pneumonia, 

admitted for new onset shortness of breath.  Chest x-ray as well as CTA showing 

pulmonary edema with small bilateral pleural effusion.  He received contrast for

the CTA 48 hours ago with subsequent worsening of his creatinine from 1.4-2.3 

and 3.6 today.  Patient was also noted with CAROLYN de los santos with RVR requiring 

cardioversion yesterday.  He has been have intermittent low blood pressure.  He 

will continue to be significantly hypoxic which has worsened after his Lasix 

dose was held.  He has only made 400 cc over the last 36 hours with only 150 cc 

since the last 9 hours from positive fluid balance of +700 cc during that time 

interval.  He continue to be significantly hypoxic on BiPAP and unable to 

tolerate high flow even for a few minutes.  His Lasix was held earlier today and

started on normal saline.  Patient is markedly dyspneic now.  Wife at bedside 

helping with history.  He denies any knowledge of chronic kidney disease.  He 

denies any recent NSAID use.  He denies any lower urinary tract symptoms prior 

to admission.  He has a current indwelling Figueroa helping with intake and output 

monitoring.  Renal consulted for azotemia.


Allergies





Penicillins Allergy (Mild, Verified 04/28/21 10:14)


   Hives/Rash





Home Medications: 








Pantoprazole [Protonix Tab*] 40 mg PO DAILYAC #30 tab 12/24/16 


Rosuvastatin [Crestor*] 40 mg PO BEDTIME 02/02/17 


Furosemide [Lasix*] 40 mg PO DAILY PRN #30 tab 02/03/17 


Clopidogrel Bisulfate [Plavix*] 75 mg PO DAILY #30 tablet 12/09/19 


Amiodarone HCl [Pacerone] 200 mg PO BID 30 Days #60 tab 02/14/22 


Metoprolol Tartrate [Lopressor*] 50 mg PO BID 30 Days #60 tab 02/14/22 


Rivaroxaban [Xarelto] 20 mg PO DAILY 30 Days #30 tab 02/14/22 








- Past Medical/Surgical History


Diabetic: No


-: HTN


-: hyperlipidemia


-: atrial fibrillation


-: GERD


-: MI


-: Obstructive sleep apnea


-: BILATERAL KNEE REPLACEMENT


-: RIGHT CAROTID SURGERY


-: BILATERAL HAND SURGERY TO RELEASE LITTLE FINGERS


-: MERLE IN LEFT FEMUR


-: cardiac stent


-: pacemaker


-: watchman 


-: aortic valve replacement 





- Family History


  ** Mother


Medical History: Heart disease





  ** Brother


Medical History: Heart disease, Stroke





- Social History


Smoking Status: Unknown if ever smoked


Alcohol use: No


CD- Drugs: No


Caffeine use: Yes


Place of Residence: Home





Review of Systems


10-point ROS is otherwise unremarkable





Physical Examination














Temp Pulse Resp BP Pulse Ox


 


 97.4 F   78   30 H  115/51 L  94 


 


 03/05/22 09:35  03/05/22 14:40  03/05/22 11:00  03/05/22 14:40  03/05/22 11:00











- Problems


(1) Acute exacerbation of CHF (congestive heart failure)


Current Visit: Yes   Status: Acute   





(2) ORACIO (acute kidney injury)


Current Visit: No   Status: Acute   





(3) Pulmonary edema


Current Visit: No   Status: Acute   


Qualifiers: 


   Chronicity: acute   Qualified Code(s): J81.0 - Acute pulmonary edema   





(4) CHF (congestive heart failure)


Onset Date: 02/03/17   Current Visit: No   Status: Chronic   


Qualifiers: 


   Qualified Code(s): I50.23 - Acute on chronic systolic (congestive) heart 

failure   


Physician Review: Patient Assessed, Agree with Above Assessment and Plan


Physician Review Additional Text: 


EchocardiogramEF of 45% with global hypokinesis





Chest x-rayreviewed, moderate pulmonary edema


CTAno PEsmall bilateral pleural effusion with pulmonary edema


BNPworsening to 38,000





Impression


Acute kidney injurydue to contrast ATN superimposed on baseline cardiorenal 

syndrome type IV


A. fib with RVRimproved


Hypertensionborderline


Acute pulmonary edema with bilateral pleural effusion


Acute hypoxic respiratory failure


Metabolic acidosiswith mild anion gap


Hyperkalemia


Chronic anticoagulation





Plan


Will  DC normal saline now since mild pulmonary edema


Obtain urine studies for fractional excretion of sodium


Obtain complement C3-C4


Obtain renal sonogram when more stablecontinue indwelling Figueroa


Optimize blood pressurekeep MAP greater than 70 mmHg


Avoid further nephrotoxins/contrast exposure


Renally dose all meds


Start aggressive IV diuresisLasix 80 mg x 1 start now, start Lasix drip to 

force nonoliguric status


If poor urine output or worsening pulmonary edema or shortness of breath will 

need initiation to have dialysis


High risk of requiring dialysis during this hospitalization


Start sodium bicarb to correct metabolic acidosis


Start midodrine 10 mg every 8 p.o. to optimize blood pressure


Monitor BMP every 12 hours


Monitor potassium levelno urgent need for Kayexalate now expected to improve 

with diuresis











Patient and wife at bedside discussed with, extensive explanation on need for 

possible dialysis if not improving over the next 24 hours with aggressive 

diuresis explained.





Total time spent greater than 80 minutes

## 2022-03-05 NOTE — P.PN
Subjective


Date of Service: 03/05/22


Chief Complaint: Shortness of breath


Subjective: Worsening (Increased oxygen requirement.  Currenlty on FiO2 of 95%)





Physical Examination





- Vital Signs


Temperature: 97.4 F


Blood Pressure: 107/49


Pulse: 76


Respirations: 30


Pulse Ox (%): 94





- Physical Exam


General: Moderate distress


HEENT: Atraumatic, Normocephalic


Respiratory: Other (BIPAP)


Cardiovascular: Regular rate/rhythm, Normal S1 S2, Edema


Musculoskeletal: No clubbing, No swelling, No contractures


Neurological: Normal speech, Normal affect





Assessment And Plan





- Current Problems (Diagnosis)


(1) Acute hypoxemic respiratory failure due to COVID-19


Current Visit: Yes   Status: Acute   





(2) Acute exacerbation of CHF (congestive heart failure)


Current Visit: Yes   Status: Acute   





(3) Atrial fibrillation with RVR


Current Visit: No   Status: Acute   





(4) CAD (coronary artery disease)


Current Visit: No   Status: Chronic   


Qualifiers: 


   Coronary Disease-Associated Artery/Lesion type: native artery   Native vs. 

transplanted heart: native heart   Associated angina: without angina   Qualified

Code(s): I25.10 - Atherosclerotic heart disease of native coronary artery with

out angina pectoris   





(5) Hyperlipidemia


Current Visit: No   Status: Chronic   


Qualifiers: 


   Hyperlipidemia type: unspecified   Qualified Code(s): E78.5 - Hyperlipidemia,

unspecified   





(6) Hypertension


Current Visit: No   Status: Chronic   


Qualifiers: 


   Hypertension type: essential hypertension 


Physician Review Additional Text: 





Assessment


Patient is a 71-year-old  male with a past medical history of 

congestive heart failure, pulmonary hypertension, atrial fibrillation status 

post watchman procedure, and TAVR.  He returned to the hospital complaining of 

shortness of breath and lower extremity edema.  He is being treated with a 

working diagnosis of Covid pneumonia with superimposed CHF exacerbation.  He was

admitted here in mid February for a similar presentation.  He has received 40 mg

of IV Lasix in the ER and Solu-Medrol. ECCV on 3/4 for afib. 





Acute hypoxemic respiratory failure


COVID-19 pneumonia


Acute on chronic CHF exacerbation


Wide-complex tachycardia


Acute kidney failure


Pacemaker in place





Plan: 


Worsened clinical status over the past 24 hours.  Increasingly hypoxemic with 

multiple EOD: ORACIO, severe lactic acidosis


Continue BIPAP wean off as tolerated


Hold lasix due to ORACIO/overdiuresis


I will obtain a Nephrology consult today


continue amiodarone, beta-blockers and Xarelto


Continue Solu-Medrol 40 mg IV every 6 hours


Continue current antibiotics


GI prophylaxis with pantoprazole, DVT prophylaxis with heparin subcu

## 2022-03-05 NOTE — PN
Date of Progress Note:  03/05/2022



Subjective:  Mr. Ma has a complicated history with Watchman, TAVR, CAD, CHF, PCI, COVID, atrial f
ibrillation, pacemaker.  We did a cardioversion on him and he converted from atrial fibrillation to s
inus rhythm, paced rhythm.  However, he remains hypoxic.  He is on Xarelto, metoprolol, amiodarone, L
asix, and heparin antibiotics, Crestor, and Protonix.  The case was discussed with Dr. Alvarez as the
 kidney function have worsened.  His oxygen saturation has plummeted.  Again, he may need his pacemak
er adjusted and we may have to transfer him to [place] in that regard.  I will discuss the case furth
er with Dr. Mena down the road.  However, for now, Dr. Davison and I suggested we stop his amiodar
one.  Stopped his Lasix.  Continue the metoprolol.  He may have amiodarone toxicity.  We will continu
e to follow.





NB/ROCHELLE

DD:  03/05/2022 16:21:38Voice ID:  [id number]

DT:  03/05/2022 20:55:32Report ID:  [id number]

## 2022-03-06 LAB
ALBUMIN SERPL BCP-MCNC: 3.3 G/DL (ref 3.4–5)
ALBUMIN SERPL BCP-MCNC: 3.8 G/DL (ref 3.4–5)
ALP SERPL-CCNC: 180 U/L (ref 45–117)
ALP SERPL-CCNC: 287 U/L (ref 45–117)
ALT SERPL W P-5'-P-CCNC: 5472 U/L (ref 12–78)
ANISOCYTOSIS BLD QL: (no result)
AST SERPL W P-5'-P-CCNC: 6540 U/L (ref 15–37)
BLD SMEAR INTERP: (no result)
BUN BLD-MCNC: 122 MG/DL (ref 7–18)
BUN BLD-MCNC: 89 MG/DL (ref 7–18)
COHGB MFR BLDA: 1.2 % (ref 0–1.5)
GLUCOSE SERPLBLD-MCNC: 140 MG/DL (ref 74–106)
GLUCOSE SERPLBLD-MCNC: 140 MG/DL (ref 74–106)
HCT VFR BLD CALC: 30.9 % (ref 39.6–49)
INR BLD: 6.43
LYMPHOCYTES # SPEC AUTO: 0.4 K/UL (ref 0.7–4.9)
MORPHOLOGY BLD-IMP: (no result)
OXYHGB MFR BLDA: 84.8 % (ref 94–97)
PMV BLD: 10.9 FL (ref 7.6–11.3)
PMV BLD: 9.1 FL (ref 7.6–11.3)
POIKILOCYTOSIS BLD QL SMEAR: SLIGHT
POLYCHROMASIA BLD QL SMEAR: (no result)
POTASSIUM SERPL-SCNC: 4.5 MMOL/L (ref 3.5–5.1)
POTASSIUM SERPL-SCNC: 5.5 MMOL/L (ref 3.5–5.1)
RBC # BLD: 3.53 M/UL (ref 4.33–5.43)
SAO2 % BLDA: 87 % (ref 92–98.5)

## 2022-03-06 PROCEDURE — 05H533Z INSERTION OF INFUSION DEVICE INTO RIGHT SUBCLAVIAN VEIN, PERCUTANEOUS APPROACH: ICD-10-PCS

## 2022-03-06 PROCEDURE — 5A1D70Z PERFORMANCE OF URINARY FILTRATION, INTERMITTENT, LESS THAN 6 HOURS PER DAY: ICD-10-PCS

## 2022-03-06 RX ADMIN — CLOPIDOGREL BISULFATE SCH: 75 TABLET, FILM COATED ORAL at 09:00

## 2022-03-06 RX ADMIN — ZOLPIDEM TARTRATE SCH MG: 5 TABLET, FILM COATED ORAL at 22:53

## 2022-03-06 RX ADMIN — METOPROLOL TARTRATE SCH: 25 TABLET ORAL at 17:33

## 2022-03-06 RX ADMIN — MIDODRINE HYDROCHLORIDE SCH: 5 TABLET ORAL at 15:00

## 2022-03-06 RX ADMIN — SODIUM BICARBONATE TAB 325 MG SCH: 325 TAB at 07:00

## 2022-03-06 RX ADMIN — MORPHINE SULFATE PRN MG: 2 INJECTION, SOLUTION INTRAMUSCULAR; INTRAVENOUS at 03:37

## 2022-03-06 RX ADMIN — DOXYCYCLINE SCH: 100 CAPSULE ORAL at 09:00

## 2022-03-06 RX ADMIN — CEFUROXIME AXETIL SCH: 250 TABLET, FILM COATED ORAL at 09:00

## 2022-03-06 RX ADMIN — MIDODRINE HYDROCHLORIDE SCH MG: 5 TABLET ORAL at 22:53

## 2022-03-06 RX ADMIN — MIDODRINE HYDROCHLORIDE SCH: 5 TABLET ORAL at 07:00

## 2022-03-06 RX ADMIN — METHYLPREDNISOLONE SODIUM SUCCINATE SCH MG: 40 INJECTION, POWDER, FOR SOLUTION INTRAMUSCULAR; INTRAVENOUS at 16:35

## 2022-03-06 RX ADMIN — DOXYCYCLINE SCH MG: 100 CAPSULE ORAL at 22:52

## 2022-03-06 RX ADMIN — METHYLPREDNISOLONE SODIUM SUCCINATE SCH MG: 40 INJECTION, POWDER, FOR SOLUTION INTRAMUSCULAR; INTRAVENOUS at 09:10

## 2022-03-06 RX ADMIN — RIVAROXABAN SCH: 15 TABLET, FILM COATED ORAL at 09:00

## 2022-03-06 RX ADMIN — ROSUVASTATIN CALCIUM SCH MG: 10 TABLET, COATED ORAL at 22:53

## 2022-03-06 RX ADMIN — METOPROLOL TARTRATE SCH: 25 TABLET ORAL at 06:00

## 2022-03-06 RX ADMIN — ALBUMIN (HUMAN) SCH MLS: 5 SOLUTION INTRAVENOUS at 00:41

## 2022-03-06 RX ADMIN — CEFUROXIME AXETIL SCH MG: 250 TABLET, FILM COATED ORAL at 22:52

## 2022-03-06 NOTE — CON
Date of Consultation:  03/06/2022



Reason For Consultation:  The patient needs emergent dialysis.



History Of Present Illness:  The patient is a 71-year-old gentleman with multiple medical problems, camilla cameron was admitted on Thursday with shortness of breath and his clinical progression has led to acute re
nal failure with respiratory compromise requiring urgent dialysis, therefore I was consulted.  The rosa reich is awake, alert.  Has difficulty talking because of his dyspnea and also has orthopnea.  Most o
f the medical information is obtained from the medical records.  The patient had aortic valve replace
d percutaneously approximately 6 months ago as well as a Watchman procedure and pacemaker and defibri
llator placed within the last 6 months.  He came in with increasing shortness of breath and now has a
cute renal failure requiring dialysis temporarily.



Review of Systems:

Otherwise unremarkable.



Past Medical History:  Significant for hypertension, hyperlipidemia, AFib, GERD, MI, obstructive slee
p apnea, recent COVID for which the patient is on anticoagulation.



Past Surgical History:  Bilateral knee replacements, right carotid surgery, bilateral hand surgery, l
eft femur surgery, cardiac stent, pacemaker, Watchman and aortic valve replacement.



Allergies:  INCLUDE PENICILLIN.



Social History:  The patient does not smoke or drink alcohol currently.



Family History:  Significant for heart disease and stroke.



Physical Examination:

Vital Signs:  Currently temperature 97, respiratory rate of 32, and the patient is on BiPAP with an O
2 saturation of 90. 

General:  He is awake and alert. 

Head and Neck:  No masses.  There is slight JVD. 

Chest:  Clear. 

Heart:  S1, S2. 

Abdomen:  Soft. 

Extremities:  Neurovascularly intact. 

Neurologic:  Nonfocal.



Laboratory Data:  His white count today is 12.3.  His platelets are 30,000.  His INR today is 6.43.  
His chemistry reviewed.  His BUN is 122, creatinine is 5.42.  Lactic acid was 5.6 yesterday and today
 is 4.0.  AST and ALT are markedly elevated at 8119 and 5472.  His BNP is greater than 41,000.



Assessment:  A 71-year-old gentleman with acute renal failure, respiratory distress requiring urgent 
dialysis.



Recommendations:  Informed consent obtained from the family for placement of temporary dialysis jade
ter.  Case was discussed with Dr. Boyd.  Risks, benefits, and alternatives were explained to the 
family.  They understood and agreed.  I will try to place it in the groin first and if unsuccessful, 
may have to use the right subclavian artery.





AS/MODL

DD:  03/06/2022 13:42:16Voice ID:  [id number]

DT:  03/06/2022 14:09:42Report ID:  [id number]

## 2022-03-06 NOTE — P.PN
Subjective


Date of Service: 03/06/22


Chief Complaint: Respiratory failure


Subjective: Worsening (Patient is severely oliguric and requring 100% OXYGEN on 

BIPAP)





Physical Examination





- Vital Signs


Temperature: 97 F


Blood Pressure: 119/56


Pulse: 74


Respirations: 32


Pulse Ox (%): 90





- Physical Exam


General: Cooperative, Moderate distress, Obese


HEENT: Atraumatic, Normocephalic


Respiratory: Other (Laboured breathing on BIPAP)


Cardiovascular: Regular rate/rhythm, Normal S1 S2


Musculoskeletal: No clubbing, No swelling, No contractures, No erythema, No 

tenderness





Assessment And Plan





- Current Problems (Diagnosis)


(1) Acute hypoxemic respiratory failure due to COVID-19


Current Visit: Yes   Status: Acute   





(2) Acute exacerbation of CHF (congestive heart failure)


Current Visit: Yes   Status: Acute   





(3) Atrial fibrillation with RVR


Current Visit: No   Status: Acute   





(4) CAD (coronary artery disease)


Current Visit: No   Status: Chronic   


Qualifiers: 


   Coronary Disease-Associated Artery/Lesion type: native artery   Native vs. 

transplanted heart: native heart   Associated angina: without angina   Qualified

Code(s): I25.10 - Atherosclerotic heart disease of native coronary artery with

out angina pectoris   





(5) Hyperlipidemia


Current Visit: No   Status: Chronic   


Qualifiers: 


   Hyperlipidemia type: unspecified   Qualified Code(s): E78.5 - Hyperlipidemia,

unspecified   





(6) Hypertension


Current Visit: No   Status: Chronic   


Qualifiers: 


   Hypertension type: essential hypertension 


Physician Review: Patient Assessed, Agree with Above Assessment and Plan


Physician Review Additional Text: 





Assessment


Patient is a 71-year-old  male with a past medical history of 

congestive heart failure, pulmonary hypertension, atrial fibrillation status 

post watchman procedure, and TAVR.  He returned to the hospital complaining of 

shortness of breath and lower extremity edema.  He is being treated with a 

working diagnosis of Covid pneumonia with superimposed CHF exacerbation.  He was

admitted here in mid February for a similar presentation.  He has received 40 mg

of IV Lasix in the ER and Solu-Medrol. ECCV on 3/4 for afib. 





Acute hypoxemic respiratory failure


COVID-19 pneumonia


Acute on chronic CHF exacerbation


Wide-complex tachycardia


Transaminasemia


Acute kidney failure


Pacemaker in place





Plan: 


Worsened clinical status over the past 48 hours.  Increasingly hypoxemic with 

multiple EOD: ORACIO, severe lactic acidosis, LFTs


CXR with diffuse edema but stable from prior imaging


Plan for HD for severe oliguria


Will start acetylcysteine for hepatoxicity


Amiodarone held to minimize toxicity


Continue BIPAP wean off as tolerated


Diuresis as per Nephrology


Continue Solu-Medrol 


Continue current antibiotics


GI prophylaxis with pantoprazole, DVT prophylaxis with heparin subcu








03/06/22 09:40





03/06/22 09:41





03/06/22 09:42

## 2022-03-06 NOTE — RAD REPORT
EXAM DESCRIPTION:  RAD - Chest Single View - 3/6/2022 1:18 pm

 

CLINICAL HISTORY:  r/o pneumo s/p HD insertion.

 

COMPARISON:  March 6

 

TECHNIQUE:  AP portable chest image was obtained 3/6/2022 1:18 pm .

 

FINDINGS:  Portable expiration technique film shows no right-sided pneumothorax. Right-sided double-l
umen catheter has been placed. Tip of the long arm catheter is in the right atrium.

 

Pacemaker remains in place. No other new tube or line. Diffuse interstitial and alveolar opacities re
main. Cardiomegaly is stable.

 

IMPRESSION:  Double-lumen right-sided catheter placement. Tip of the long arm is in the right atrium.


 

No pneumothorax.

## 2022-03-06 NOTE — P.OP
Assistant: NURA VILLATORO


Preoperative diagnosis: ARF, Respiratory Distress


Postoperative diagnosis: same


Primary procedure: Right Subclavian Reinier Catheter, Fluoroscopy


Secondary procedure: Attempted Bilateral Femoral Approach with Sono-Site


Anesthesia: General


Estimated blood loss: min


Specimen: none


Findings: As above


Complications: None


Transferred to: ICU


Condition: Serious

## 2022-03-06 NOTE — P.PN
Subjective


Date of Service: 03/06/22


Chief Complaint: Respiratory failure


Patient's condition is deteriorating multiorgan failure renal function and liver

liver function x-ray still shows interstitial changes patient is alert on BiPAP





Review of Systems


General: Weakness


Respiratory: Shortness of Breath





Physical Examination





- Vital Signs


Temperature: 97 F


Blood Pressure: 119/56


Pulse: 74


Respirations: 32


Pulse Ox (%): 90





- Physical Exam


General: Alert, Moderate distress


Respiratory: Crackles/rales


Cardiovascular: Irregular heart rate/rhythm


Gastrointestinal: Normal bowel sounds, Soft and benign





Assessment And Plan





- Current Problems (Diagnosis)


(1) Respiratory failure


Current Visit: Yes   Status: Acute   


Plan: 


Respiratory failure multiorgan failure scheduled to have a dialysis catheter 

placed stable side effect of amiodarone and a Lasix drip EPAP has been adjusted 

chest x-ray no change agree with acetylcysteine increase Solu-Medrol patient 

continues to remain very hypoxic to 60 to 100% FiO2 may end up on a ventilator 

wife at the bedside patient did get contrast have affected his kidneys gnosis 

poor


Qualifiers: 


   Chronicity: acute on chronic 


Physician Review: Patient Assessed, Agree with Above Assessment and Plan

## 2022-03-06 NOTE — PN
Date of Progress Note:  03/06/2022



Mr. Ma has been deteriorating since he has come in with rapid atrial fibrillation followed by car
dioversion to paced rhythm.  He was amiodarone that was stopped secondary to possible liver and lung 
toxicity.  His liver enzymes are very elevated.  His kidney function has worsened and he now needs di
alysis.  I will clear him for Dr. Atkinson for access for dialysis after platelet transfusions.  I think
 his kidney function and liver function are more likely to be passive congestion and renal failure se
condary to low cardiac output.  I think it would be good to try to transfer Mr. Ma to a tertiary 
care center where he may have to have a pacemaker adjustment.





NB/MODL

DD:  03/06/2022 18:23:10Voice ID:  [id number]

DT:  03/06/2022 19:25:25Report ID:  [id number]

## 2022-03-06 NOTE — P.PN
Subjective


Date of Service: 03/06/22


Chief Complaint: Respiratory failure


Subjective: Worsening (- on high dose bipap this am  -still,oliguric  - new 

acute liver failure)





Physical Examination





- Vital Signs


Temperature: 97 F


Blood Pressure: 119/56


Pulse: 74


Respirations: 32


Pulse Ox (%): 90





Assessment And Plan





- Current Problems (Diagnosis)


(1) Acute exacerbation of CHF (congestive heart failure)


Current Visit: Yes   Status: Acute   





(2) ORACIO (acute kidney injury)


Current Visit: No   Status: Acute   





(3) Pulmonary edema


Current Visit: No   Status: Acute   


Qualifiers: 


   Chronicity: acute   Qualified Code(s): J81.0 - Acute pulmonary edema   





(4) CHF (congestive heart failure)


Onset Date: 02/03/17   Current Visit: No   Status: Chronic   


Physician Review: Patient Assessed, Agree with Above Assessment and Plan


Physician Review Additional Text: 





03/06/22 11:50


Physical examination


Generalmiddle-aged male, chronically ill looking, on bipap 95%


HEENTPERRLA/EOMI


Neckno JVD no carotid bruit


Respiratorymoderate bibasilar crepitations, no chest wall tenderness


CardiovascularS1-S2 rate and regular


GIfull soft bowel sounds positive no organomegaly


Extremities1+ pedal edema, no calf tenderness


Neuroalert oriented cranial 2-12 grossly intact











EchocardiogramEF of 45% with global hypokinesis





Chest x-rayreviewed, moderate pulmonary edema


CTAno PEsmall bilateral pleural effusion with pulmonary edema


BNPworsening to 38,000





Impression


Acute kidney injurydue to contrast ATN superimposed on baseline cardiorenal 

syndrome type IV


A. fib with RVRimproved


Hypertensionborderline


Acute pulmonary edema with bilateral pleural effusion


Acute hypoxic respiratory failure


Metabolic acidosiswith mild anion gap


Hyperkalemia


Chronic anticoagulation


New acute liver failure 


Marked thrombocytopneia 





Plan


 failed response to lasix gtt 


-need for stat dialysis line since worsneing resp status discussed 


- family agreeable 


- Gen surgery fro temp dialysis line today 


-dose platelets since low platelets and elvated INR 


-hold anticoagulation now 


- start dialysis today #1- 4 hrs - 2 hr UF only for 2 L and 2 hrs HD 


HD dose - 250/400 k 2 bicarb 40 sodium 140





-will dose albumin 50 g prn if low BP 


-may need IV levophed to allow UF 


-wan off bipap after dialysis today 


continue indwelling Figueroa


Optimize blood pressurekeep MAP greater than 70 mmHg


Avoid further nephrotoxins/contrast exposure


Renally dose all meds


-dc bicarb po and lasix gtt


-c/w midodrine

## 2022-03-06 NOTE — RAD REPORT
EXAM DESCRIPTION:  RAD - Chest Single View - 3/6/2022 6:23 am

 

CLINICAL HISTORY:  resp failure

 

COMPARISON:  Portable March 5, CT chest March 3

 

TECHNIQUE:  AP portable chest image was obtained 3/6/2022 6:23 am .

 

FINDINGS:  Diffuse bilateral alveolar edema or infiltrate findings have not changed. No developing ne
w area of consolidation. Pacemaker remains in place. Cardiomegaly is present. There is fullness of th
e mediastinum again noted. CT chest showed no worrisome mass or lymphadenopathy. No pneumothorax or l
arge pleural effusion.

 

IMPRESSION:  Diffuse edema or infiltrate pattern stable from prior imaging.

 

Stable cardiomegaly.

## 2022-03-06 NOTE — RAD REPORT
EXAM DESCRIPTION:  RAD - Fluoroscopy <1 Hour - 3/6/2022 1:18 pm

 

FINDINGS:  V portable C-arm views were submitted from fluoroscopic assisted placement of a right-side
d double-lumen dialysis catheter. No suspicious or unexpected findings on submitted images.

 

Fluoro time was 0.2 minutes. Cumulative dose was 15.1 mGy.

## 2022-03-06 NOTE — OP
Surgeon:  Kaushik Atkinson MD



Assistant:  Jeremie Lee, surgical assist certified.



Preoperative Diagnoses:  Acute renal failure and respiratory distress.



Postoperative Diagnoses:  Acute renal failure and respiratory distress.



Procedures:  

1.Placement of right subclavian Reinier catheter and interpretation of intraoperative fluoroscopy.

2.Attempted bilateral femoral Reinier catheter placement SonoSite, which was unsuccessful.



Estimated Blood Loss:  Minimal.



Specimen:  None.



Findings:  Difficult to identify the femoral vein in both groins probably secondary to the patient ha
ving a TAVR procedure done recently; however, in the right subclavian region, there was no difficulty
 at all and anesthesia was MAC.  While we were trying to attempt the groin region, however, it was co
nverted to an LMA where we were unsuccessful in the groin and needed to attempt to subclavian region.




Complications:  None.



Disposition:  The patient tolerated the procedure in stable condition and taken to ICU in serious con
dition, but there was no change from preoperatively.



Procedure In Detail:  The patient was brought to the OR and placed in supine position.  MAC anesthesi
a was begun.  The patient was prepped and draped in the usual sterile fashion.  In both groins, SonoS
ite was utilized as well as the Doppler machine to try to isolate the left femoral artery and then at
tempts were made to access the femoral vein.  It was very difficult to identify it on SonoSite and we
 were unsuccessfully on both sides.  Therefore, at this time decision was made after discussion with 
the OR team that we would attempt a right subclavian region because of his anticoagulation status and
 there was a high risk would be in the IJ should he have a complication there and he may compromise t
he airway, therefore right side of the chest was prepped and draped in usual sterile fashion.  Lidoca
ine 1% was infiltrated locally.  An 18-gauge needle was used to access the right subclavian vein.  Gu
idewire was passed.  Tract dilated.  Seldinger technique used and catheter placed and secured with 3-
0 nylon and catheter flushed with saline and packed with saline with good blood flow.  The patient to
lerated procedure in stable condition and then taken back to ICU in serious condition.  Chest x-ray h
as been ordered.





AS/MODL

DD:  03/06/2022 13:45:45Voice ID:  [id number]

DT:  03/06/2022 14:18:36Report ID:  [id number]

## 2022-03-07 LAB
ALBUMIN SERPL BCP-MCNC: 3.2 G/DL (ref 3.4–5)
ALP SERPL-CCNC: 294 U/L (ref 45–117)
BUN BLD-MCNC: 109 MG/DL (ref 7–18)
GLUCOSE SERPLBLD-MCNC: 171 MG/DL (ref 74–106)
HCT VFR BLD CALC: 26.2 % (ref 39.6–49)
LYMPHOCYTES # SPEC AUTO: 0.6 K/UL (ref 0.7–4.9)
PMV BLD: 10.1 FL (ref 7.6–11.3)
POTASSIUM SERPL-SCNC: 5.2 MMOL/L (ref 3.5–5.1)
RBC # BLD: 3.07 M/UL (ref 4.33–5.43)

## 2022-03-07 RX ADMIN — MIDODRINE HYDROCHLORIDE SCH MG: 5 TABLET ORAL at 14:27

## 2022-03-07 RX ADMIN — METOPROLOL TARTRATE SCH MG: 25 TABLET ORAL at 21:51

## 2022-03-07 RX ADMIN — MIDODRINE HYDROCHLORIDE SCH MG: 5 TABLET ORAL at 09:45

## 2022-03-07 RX ADMIN — CEFUROXIME AXETIL SCH MG: 250 TABLET, FILM COATED ORAL at 21:51

## 2022-03-07 RX ADMIN — CLOPIDOGREL BISULFATE SCH: 75 TABLET, FILM COATED ORAL at 09:00

## 2022-03-07 RX ADMIN — RIVAROXABAN SCH: 15 TABLET, FILM COATED ORAL at 09:00

## 2022-03-07 RX ADMIN — MIDODRINE HYDROCHLORIDE SCH MG: 5 TABLET ORAL at 23:28

## 2022-03-07 RX ADMIN — METHYLPREDNISOLONE SODIUM SUCCINATE SCH MG: 40 INJECTION, POWDER, FOR SOLUTION INTRAMUSCULAR; INTRAVENOUS at 17:53

## 2022-03-07 RX ADMIN — ROSUVASTATIN CALCIUM SCH: 10 TABLET, COATED ORAL at 21:00

## 2022-03-07 RX ADMIN — METHYLPREDNISOLONE SODIUM SUCCINATE SCH MG: 40 INJECTION, POWDER, FOR SOLUTION INTRAMUSCULAR; INTRAVENOUS at 00:59

## 2022-03-07 RX ADMIN — DOXYCYCLINE SCH MG: 100 CAPSULE ORAL at 09:45

## 2022-03-07 RX ADMIN — CEFUROXIME AXETIL SCH MG: 250 TABLET, FILM COATED ORAL at 09:45

## 2022-03-07 RX ADMIN — Medication SCH ML: at 14:26

## 2022-03-07 RX ADMIN — METHYLPREDNISOLONE SODIUM SUCCINATE SCH MG: 40 INJECTION, POWDER, FOR SOLUTION INTRAMUSCULAR; INTRAVENOUS at 09:45

## 2022-03-07 RX ADMIN — METOPROLOL TARTRATE SCH MG: 25 TABLET ORAL at 09:57

## 2022-03-07 RX ADMIN — DOXYCYCLINE SCH MG: 100 CAPSULE ORAL at 21:51

## 2022-03-07 NOTE — PN
Date of Progress Note:  03/07/2022



Mr. Ma is back in atrial fibrillation at a rate of about 110 to 120.  We had to stop his amiodaro
ne because of possible liver and lung toxicity.  He remains fluid overloaded.  He is going to be dial
yzed today and apparently __________ about 4 L of fluid.  His blood pressure is 120.  I am going to p
ut him back on metoprolol 25 mg b.i.d.  Mr. Ma needs to be transferred to a tertiary care center.
  He may need some adjustment to his pacemaker.  He needs to have an Electrophysiology consultation. 
 For now, continue present regimen.





NB/ROCHELLE

DD:  03/07/2022 10:45:03Voice ID:  [id number]

DT:  03/07/2022 11:27:07Report ID:  [id number] rest/medications/repositioning

## 2022-03-07 NOTE — RAD REPORT
EXAM DESCRIPTION:  RAD - Chest Single View - 3/7/2022 5:51 am

 

CLINICAL HISTORY:  resp failure

 

COMPARISON:  Portable March 6

 

TECHNIQUE:  AP portable chest image was obtained 3/7/2022 5:51 am .

 

FINDINGS:  Exam is under penetrated compared to the prior day study. Bilateral interstitial and alveo
lar opacities are stable.

 

Double-lumen catheter remains in place. Cardiomegaly has decreased slightly from prior examination. N
o measurable pleural effusion and no pneumothorax.

 

IMPRESSION:  No change to the diffuse bilateral pulmonary edema or infiltrate pattern.

 

Cardiomegaly remains but is improved from prior day imaging.

## 2022-03-07 NOTE — P.PN
Subjective


Date of Service: 03/07/22


Chief Complaint: Respiratory failure


Subjective: No new changes (still on bipap  s/p 3 L UF with dialysis yesterdray 

- still intermittent afib)





Physical Examination





- Vital Signs


Temperature: 96.9 F


Blood Pressure: 128/57


Pulse: 113


Respirations: 28


Pulse Ox (%): 93





Assessment And Plan





- Current Problems (Diagnosis)


(1) Acute exacerbation of CHF (congestive heart failure)


Current Visit: Yes   Status: Acute   





(2) ORACIO (acute kidney injury)


Current Visit: No   Status: Acute   





(3) Pulmonary edema


Current Visit: No   Status: Acute   


Qualifiers: 


   Chronicity: acute   Qualified Code(s): J81.0 - Acute pulmonary edema   





(4) CHF (congestive heart failure)


Onset Date: 02/03/17   Current Visit: No   Status: Chronic   


Physician Review: Patient Assessed, Agree with Above Assessment and Plan


Physician Review Additional Text: 





03/06/22 11:50


Physical examination


Generalmiddle-aged male, chronically ill looking, on bipap , dypsnioec 


HEENTPERRLA/EOMI


Neckno JVD no carotid bruit


Respiratorymoderate bibasilar crepitations, no chest wall tenderness


CardiovascularS1-S2 rate and regular


GIfull soft bowel sounds positive no organomegaly


Extremitiesimproved  pedal edema, no calf tenderness


Neuroalert oriented cranial 2-12 grossly intact











EchocardiogramEF of 45% with global hypokinesis





Chest x-ray3/7- still persistent  pulmonary edema


CTAno PEsmall bilateral pleural effusion with pulmonary edema


BNPworsening to 50,000





Impression


Acute kidney injurydue to contrast ATN superimposed on baseline cardiorenal 

syndrome type IV


A. fib with RVRimproved


Hypertensionborderline


Acute pulmonary edema with bilateral pleural effusion


Acute hypoxic respiratory failure


Metabolic acidosiswith mild anion gap


Hyperkalemia


Chronic anticoagulation


New acute liver failure 


Marked thrombocytopneia 





Plan


 Repeat HD today - target 4L UF 


- HD today - QB//600 uf only 2 l FIRST HR THEN 2 L wth HD  k 2.0, calcium 

3.5


-hold anticoagulation now 


-c/w control of afib 


-may need IV levophed to allow UF 


-wean off bipap as tolerated 


continue indwelling Figueroa


Optimize blood pressurekeep MAP greater than 70 mmHg


Avoid further nephrotoxins/contrast exposure


Renally dose all meds


-c/w midodrine 


03/07/22 08:06

## 2022-03-07 NOTE — P.PN
Subjective


Date of Service: 03/07/22


Chief Complaint: Respiratory failure


Patient not doing well on BiPAP alert able to speak Alysis catheter has been 

inserted





Review of Systems


General: Weakness


Respiratory: Shortness of Breath





Physical Examination





- Vital Signs


Temperature: 96.9 F


Blood Pressure: 117/75


Pulse: 102


Respirations: 29


Pulse Ox (%): 98





- Physical Exam


General: Alert, Moderate distress


Respiratory: Crackles/rales, Expiratory wheezes


Cardiovascular: Normal S1 S2





Assessment And Plan





- Current Problems (Diagnosis)


(1) Respiratory failure


Current Visit: Yes   Status: Acute   


Plan: 


Respiratory failure liver function improving on dialysis vital signs stable 

patient still had diffuse interstitial changes DC dobutamine unlikely to be 

arrhythmogenic continue with Solu-Medrol for now patient is on FiO2 of 80% with 

a PEEP of 12 on BiPAP oxygenation satisfactory


Qualifiers: 


   Chronicity: acute on chronic 


Physician Review: Patient Assessed, Agree with Above Assessment and Plan

## 2022-03-07 NOTE — RAD REPORT
EXAM DESCRIPTION:  RAD - Abdomen 1 View (KUB) - 3/7/2022 1:10 pm

 

CLINICAL HISTORY:  dobhoff placement

Pain

 

COMPARISON:  No comparisons

 

FINDINGS:  Enteric tube tip is in the stomach.

## 2022-03-08 LAB
ALBUMIN SERPL BCP-MCNC: 3.2 G/DL (ref 3.4–5)
ALP SERPL-CCNC: 578 U/L (ref 45–117)
BUN BLD-MCNC: 91 MG/DL (ref 7–18)
COHGB MFR BLDA: 1.6 % (ref 0–1.5)
GLUCOSE SERPLBLD-MCNC: 188 MG/DL (ref 74–106)
HCT VFR BLD CALC: 28.1 % (ref 39.6–49)
INR BLD: 2.81
LYMPHOCYTES # SPEC AUTO: 0.3 K/UL (ref 0.7–4.9)
MAGNESIUM SERPL-MCNC: 3.2 MG/DL (ref 1.8–2.4)
OXYHGB MFR BLDA: 93.5 % (ref 94–97)
PMV BLD: 10.4 FL (ref 7.6–11.3)
POTASSIUM SERPL-SCNC: 5 MMOL/L (ref 3.5–5.1)
RBC # BLD: 3.27 M/UL (ref 4.33–5.43)
SAO2 % BLDA: 96.6 % (ref 92–98.5)

## 2022-03-08 RX ADMIN — MORPHINE SULFATE PRN MG: 4 INJECTION, SOLUTION INTRAMUSCULAR; INTRAVENOUS at 08:58

## 2022-03-08 RX ADMIN — METHYLPREDNISOLONE SODIUM SUCCINATE SCH MG: 40 INJECTION, POWDER, FOR SOLUTION INTRAMUSCULAR; INTRAVENOUS at 08:57

## 2022-03-08 RX ADMIN — Medication SCH ML: at 17:45

## 2022-03-08 RX ADMIN — METOPROLOL TARTRATE SCH MG: 25 TABLET ORAL at 17:44

## 2022-03-08 RX ADMIN — MIDODRINE HYDROCHLORIDE SCH MG: 5 TABLET ORAL at 15:23

## 2022-03-08 RX ADMIN — HEPARIN SODIUM PRN UNIT: 1000 INJECTION, SOLUTION INTRAVENOUS; SUBCUTANEOUS at 14:28

## 2022-03-08 RX ADMIN — MORPHINE SULFATE PRN MG: 4 INJECTION, SOLUTION INTRAMUSCULAR; INTRAVENOUS at 20:36

## 2022-03-08 RX ADMIN — DOXYCYCLINE SCH MG: 100 CAPSULE ORAL at 08:58

## 2022-03-08 RX ADMIN — MIDODRINE HYDROCHLORIDE SCH MG: 5 TABLET ORAL at 08:58

## 2022-03-08 RX ADMIN — CEFUROXIME AXETIL SCH MG: 250 TABLET, FILM COATED ORAL at 08:58

## 2022-03-08 RX ADMIN — METOPROLOL TARTRATE SCH MG: 25 TABLET ORAL at 06:14

## 2022-03-08 RX ADMIN — ZOLPIDEM TARTRATE PRN MG: 5 TABLET, FILM COATED ORAL at 20:37

## 2022-03-08 RX ADMIN — METHYLPREDNISOLONE SODIUM SUCCINATE SCH MG: 40 INJECTION, POWDER, FOR SOLUTION INTRAMUSCULAR; INTRAVENOUS at 01:20

## 2022-03-08 RX ADMIN — ROSUVASTATIN CALCIUM SCH: 10 TABLET, COATED ORAL at 19:56

## 2022-03-08 RX ADMIN — CEFUROXIME AXETIL SCH MG: 250 TABLET, FILM COATED ORAL at 20:38

## 2022-03-08 RX ADMIN — DOXYCYCLINE SCH MG: 100 CAPSULE ORAL at 20:37

## 2022-03-08 RX ADMIN — METHYLPREDNISOLONE SODIUM SUCCINATE SCH MG: 40 INJECTION, POWDER, FOR SOLUTION INTRAMUSCULAR; INTRAVENOUS at 17:08

## 2022-03-08 RX ADMIN — ALBUMIN (HUMAN) SCH MLS: 5 SOLUTION INTRAVENOUS at 12:00

## 2022-03-08 NOTE — RAD REPORT
EXAM DESCRIPTION:  RAD - Chest Single View - 3/8/2022 6:28 am

 

CLINICAL HISTORY:  pneumonia

Chest pain.

 

COMPARISON:  Abdomen 1 View (KUB) dated 3/7/2022; Chest Single View dated 3/7/2022; Chest Single View
 dated 3/6/2022; Chest Single View dated 3/6/2022

 

FINDINGS:  Portable technique limits examination quality.

 

Moderate bilateral pulmonary opacities are present, without significant change since yesterday's stud
y. The heart is mildly enlarged. Multi lead pacer device is present. Enteric tube right-sided venous 
catheter in place.

## 2022-03-08 NOTE — P.PN
Date of Service: 03/08/22





Subjective


Patient is showing some improvement.  Hemodialysis in process.  LFTs have 

plateaued and are decreasing.  Total bili has improved as well.  Acidosis 

improving.  Continue with aggressive hemodialysis.





Review of Systems


10-point ROS is otherwise unremarkable





Physical Examination





- Vital Signs


Reviewed





- Physical Exam


General: Alert, respiratory distress; BiPAP in place


HEENT: Atraumatic, PERRLA, EOMI


Neck: Supple, JVD not distended


Respiratory:  diminished breath sounds and basilar crackles


Cardiovascular: Tachyarrhythmia


Gastrointestinal: Abdominal distention no tenderness


Musculoskeletal: Lower extremity edema


Integumentary: No rashes diffuse bruising


Neurological: Normal speech, Normal tone, Normal affect





Assessment & Plan





- Problems (Diagnosis)


(1) Liver failure


Current Visit: Yes   Status: Acute   





(2) Acute kidney failure


Current Visit: Yes   Status: Acute   





(3) Septic shock


Current Visit: Yes   Status: Acute   





(4) Lactic acidosis


Current Visit: Yes   Status: Acute   





(5) Respiratory failure


Current Visit: Yes   Status: Acute   


Qualifiers: 


   Chronicity: acute on chronic 





(6) Acute respiratory distress


Current Visit: No   Status: Acute   





(7) Atrial fibrillation with RVR


Current Visit: No   Status: Acute   





(8) Chronic diastolic heart failure


Current Visit: No   Status: Acute   





- Plan


Continue with plan of care as mentioned below:


1.  Continue with BiPAP support; try to alternate with O2


2.  Hemodialysis per nephrology


3.  Correct acidosis with bicarb today


4.  IV digoxin x1;


5.  Continue with pulmonary, cardiology, and nephrology assistance


6.  Trying to arrange for transfer to a tertiary care facility


7.  Prognosis is poor but at this time patient wants everything done


8.  Continue with trying to achieve rate control


9.  May need vasopressor support and may add Levophed


10.  GI DVT prophylaxis


Discharge Plan: Home


Plan to discharge in: Greater than 2 days





- Advance Directives


Does patient have a Living Will: No


Does patient have a Durable POA for Healthcare: No





- Code Status/Comfort Care


Code Status Assessed: Yes


Code Status: Full Code


Physician Review: Patient Assessed, Agree with Above Assessment and Plan


Critical Care: Yes


Time Spent Managing PTS Care (In Minutes): 55

## 2022-03-08 NOTE — P.PN
Subjective


Date of Service: 03/08/22


Chief Complaint: Respiratory failure


Subjective: No new changes, No C/O voiced (on HD session 3 - still dyspneic  - 

still on bipap  - 4 L UF yeseterday , on 2.5 L UF today)





Physical Examination





- Vital Signs


Temperature: 97.9 F


Blood Pressure: 108/59


Pulse: 106


Respirations: 34


Pulse Ox (%): 86





- Studies


Microbiology Data (last 24 hrs): 








03/03/22 08:21   Blood  - Blood   Aerobic Blood Culture - Final


                            No growth in 5 days.


03/03/22 08:21   Blood  - Blood   Anaerobic Blood Culture - Final


                            No growth in 5 days.


03/03/22 08:08   Blood  - Blood   Aerobic Blood Culture - Final


                            No growth in 5 days.


03/03/22 08:08   Blood  - Blood   Anaerobic Blood Culture - Final


                            No growth in 5 days.





Medications List Reviewed: Yes





Assessment And Plan





- Current Problems (Diagnosis)


(1) Acute exacerbation of CHF (congestive heart failure)


Current Visit: Yes   Status: Acute   





(2) ORACIO (acute kidney injury)


Current Visit: No   Status: Acute   





(3) Pulmonary edema


Current Visit: No   Status: Acute   


Qualifiers: 


   Chronicity: acute   Qualified Code(s): J81.0 - Acute pulmonary edema   





(4) CHF (congestive heart failure)


Onset Date: 02/03/17   Current Visit: No   Status: Chronic   


Physician Review: Patient Assessed, Agree with Above Assessment and Plan


Physician Review Additional Text: 








Physical examination








Generalmiddle-aged male, chronically ill looking, on bipap , dypsneic , 70% 

fio2 


HEENTPERRLA/EOMI


Neckno JVD no carotid bruit


Respiratorymoderate bibasilar crepitations, no chest wall tenderness


CardiovascularS1-S2 rate and regular


GIfull soft bowel sounds positive no organomegaly


Extremitiesimproved  pedal edema, no calf tenderness


Neuroalert oriented cranial 2-12 grossly intact











EchocardiogramEF of 45% with global hypokinesis





Chest x-ray3/7- still persistent  pulmonary edema


CTAno PEsmall bilateral pleural effusion with pulmonary edema


BNPworsening to 50,000





Impression





Acute kidney injurydue to contrast ATN superimposed on baseline cardiorenal 

syndrome type IV


A. fib with RVRimproved


Hypertensionborderline


Acute pulmonary edema with bilateral pleural effusion


Acute hypoxic respiratory failure


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


Metabolic acidosiswith mild anion gap


Hyperkalemia- Resolved


Chronic anticoagulation


New acute liver failure 


Marked thrombocytopenia 





Plan


 continue HD today 


- HD today - QB//600 uf 3L, HD  k 2.0, calcium 3.5


-low BP limiting UF 


-still repeated atrial fib with RVR


-may need repeat HD in am 


-May need dobutamine or milrinone 


-hold anticoagulation now 


-c/w control of afib 


-c/w gentle IV steroids 


-will wean off bipap as tolerated 


-continue indwelling Figueroa


-Optimize blood pressurekeep MAP greater than 70 mmHg


-Avoid further nephrotoxins/contrast exposure


-Renally dose all meds


-c/w midodrine 











03/08/22 14:02

## 2022-03-08 NOTE — P.PN
Subjective


Date of Service: 03/07/22





Patient still in respiratory distress.  Starting hemodialysis today.  Hopefully,

his respiratory status will improve.  Patient also severely acidotic.  Continue 

with bicarb.  Clinical symptoms are slow to improve.





Review of Systems


10-point ROS is otherwise unremarkable





Physical Examination





- Vital Signs


Temperature: 97.9 F


Blood Pressure: 108/59


Pulse: 106


Respirations: 34


Pulse Ox (%): 86





- Physical Exam


General: Alert, In no apparent distress


HEENT: Atraumatic, PERRLA, EOMI


Neck: Supple, JVD not distended


Respiratory: Clear to auscultation bilaterally, Normal air movement


Cardiovascular: Regular rate/rhythm, Normal S1 S2


Gastrointestinal: Normal bowel sounds, No tenderness


Musculoskeletal: No tenderness


Integumentary: No rashes


Neurological: Normal speech, Normal tone, Normal affect


Lymphatics: No axilla or inguinal lymphadenopathy





- Studies


Microbiology Data (last 24 hrs): 








03/03/22 08:21   Blood  - Blood   Aerobic Blood Culture - Final


                            No growth in 5 days.


03/03/22 08:21   Blood  - Blood   Anaerobic Blood Culture - Final


                            No growth in 5 days.


03/03/22 08:08   Blood  - Blood   Aerobic Blood Culture - Final


                            No growth in 5 days.


03/03/22 08:08   Blood  - Blood   Anaerobic Blood Culture - Final


                            No growth in 5 days.





Medications List Reviewed: Yes





Assessment & Plan





- Problems (Diagnosis)


(1) Liver failure


Current Visit: Yes   Status: Acute   





(2) Acute kidney failure


Current Visit: Yes   Status: Acute   





(3) Septic shock


Current Visit: Yes   Status: Acute   





(4) Lactic acidosis


Current Visit: Yes   Status: Acute   





(5) Respiratory failure


Current Visit: Yes   Status: Acute   


Qualifiers: 


   Chronicity: acute on chronic 





(6) Acute respiratory distress


Current Visit: No   Status: Acute   





(7) Atrial fibrillation with RVR


Current Visit: No   Status: Acute   





(8) Chronic diastolic heart failure


Current Visit: No   Status: Acute   





- Plan





Plan:


1.  Continue with BiPAP support


2.  Hemodialysis per nephrology


3.  Correct acidosis with bicarb


4.  IV digoxin


5.  Continue with pulmonary, cardiology, and nephrology assistance


6.  Trying to arrange for transfer to a tertiary care facility


7.  Prognosis is poor but at this time patient wants everything done


8.  Continue with trying to achieve rate control


9.  May need vasopressor support and may add Levophed


10.  GI DVT prophylaxis


Discharge Plan: Home


Plan to discharge in: Greater than 2 days





- Advance Directives


Does patient have a Living Will: No


Does patient have a Durable POA for Healthcare: No





- Code Status/Comfort Care


Code Status Assessed: Yes


Code Status: Full Code


Physician Review: Patient Assessed, Agree with Above Assessment and Plan


Critical Care: Yes


Time Spent Managing PTS Care (In Minutes): 55

## 2022-03-09 LAB
ALBUMIN SERPL BCP-MCNC: 3.4 G/DL (ref 3.4–5)
ALP SERPL-CCNC: 547 U/L (ref 45–117)
BUN BLD-MCNC: 85 MG/DL (ref 7–18)
GLUCOSE SERPLBLD-MCNC: 184 MG/DL (ref 74–106)
POTASSIUM SERPL-SCNC: 5.6 MMOL/L (ref 3.5–5.1)

## 2022-03-09 PROCEDURE — 5A09457 ASSISTANCE WITH RESPIRATORY VENTILATION, 24-96 CONSECUTIVE HOURS, CONTINUOUS POSITIVE AIRWAY PRESSURE: ICD-10-PCS

## 2022-03-09 RX ADMIN — ROSUVASTATIN CALCIUM SCH: 10 TABLET, COATED ORAL at 20:28

## 2022-03-09 RX ADMIN — METHYLPREDNISOLONE SODIUM SUCCINATE SCH MG: 40 INJECTION, POWDER, FOR SOLUTION INTRAMUSCULAR; INTRAVENOUS at 09:34

## 2022-03-09 RX ADMIN — MIDODRINE HYDROCHLORIDE SCH MG: 5 TABLET ORAL at 21:56

## 2022-03-09 RX ADMIN — METOPROLOL TARTRATE SCH MG: 25 TABLET ORAL at 06:19

## 2022-03-09 RX ADMIN — ALBUMIN (HUMAN) SCH MLS: 5 SOLUTION INTRAVENOUS at 09:13

## 2022-03-09 RX ADMIN — HEPARIN SODIUM PRN UNIT: 1000 INJECTION, SOLUTION INTRAVENOUS; SUBCUTANEOUS at 12:06

## 2022-03-09 RX ADMIN — CEFUROXIME AXETIL SCH MG: 250 TABLET, FILM COATED ORAL at 12:44

## 2022-03-09 RX ADMIN — MIDODRINE HYDROCHLORIDE SCH MG: 5 TABLET ORAL at 00:05

## 2022-03-09 RX ADMIN — DOXYCYCLINE SCH MG: 100 CAPSULE ORAL at 09:34

## 2022-03-09 RX ADMIN — METOPROLOL TARTRATE SCH MG: 25 TABLET ORAL at 18:15

## 2022-03-09 RX ADMIN — METHYLPREDNISOLONE SODIUM SUCCINATE SCH MG: 40 INJECTION, POWDER, FOR SOLUTION INTRAMUSCULAR; INTRAVENOUS at 18:15

## 2022-03-09 RX ADMIN — MIDODRINE HYDROCHLORIDE SCH MG: 5 TABLET ORAL at 15:47

## 2022-03-09 RX ADMIN — DOXYCYCLINE SCH MG: 100 CAPSULE ORAL at 20:29

## 2022-03-09 RX ADMIN — MIDODRINE HYDROCHLORIDE SCH MG: 5 TABLET ORAL at 06:20

## 2022-03-09 RX ADMIN — METHYLPREDNISOLONE SODIUM SUCCINATE SCH MG: 40 INJECTION, POWDER, FOR SOLUTION INTRAMUSCULAR; INTRAVENOUS at 00:05

## 2022-03-09 RX ADMIN — ZOLPIDEM TARTRATE PRN MG: 5 TABLET, FILM COATED ORAL at 20:30

## 2022-03-09 RX ADMIN — CEFUROXIME AXETIL SCH MG: 250 TABLET, FILM COATED ORAL at 20:28

## 2022-03-09 NOTE — P.PN
Subjective


Date of Service: 03/09/22


Chief Complaint: Respiratory failure


Condition stable oxygenation satisfactory patient undergoes undergoing dialysis 

every day





Review of Systems


General: Weakness


Respiratory: Shortness of Breath





Physical Examination





- Vital Signs


Temperature: 97.1 F


Blood Pressure: 129/50


Pulse: 113


Respirations: 28


Pulse Ox (%): 97





- Physical Exam


General: Alert, Cooperative


Respiratory: Clear to auscultation bilaterally, Diminished


Cardiovascular: Irregular heart rate/rhythm





- Studies


Microbiology Data (last 24 hrs): 








03/03/22 08:21   Blood  - Blood   Aerobic Blood Culture - Final


                            No growth in 5 days.


03/03/22 08:21   Blood  - Blood   Anaerobic Blood Culture - Final


                            No growth in 5 days.


03/03/22 08:08   Blood  - Blood   Aerobic Blood Culture - Final


                            No growth in 5 days.


03/03/22 08:08   Blood  - Blood   Anaerobic Blood Culture - Final


                            No growth in 5 days.





Medications List Reviewed: Yes





Assessment And Plan





- Current Problems (Diagnosis)


(1) Respiratory failure


Current Visit: Yes   Status: Acute   


Plan: 


Respiratory failure oxygenation is improving we will plan to titrate her since 

his sat down to 90% patient is mildly hyperkalemic liver function tests have 

been improving White count is declining significant bilateral interstitial 

changes continue with steroids


Qualifiers: 


   Chronicity: acute on chronic 


Physician Review: Patient Assessed, Agree with Above Assessment and Plan

## 2022-03-09 NOTE — PN
Date of Progress Note:  03/09/2022



Subjective:  The patient was admitted with cardiogenic shock.  The patient had a
cardiac arrhythmia.  The patient had conversion on amiodarone.  Had liver and 
acute kidney injury, oliguric, initiated dialysis.



Physical Examination:

Vital Signs:  Blood pressure 128/74, pulse of 113, afebrile.  The patient had 
dialysis yesterday, managed to remove 4 L today goaling with 2 L. 

Chest:  Crackles bilateral. 

Heart:  S1, S2.  Systolic murmur. 

Abdomen:  Soft, nontender. 

Extremity:  Plus edema. 

Neuro:  The patient is sleeping.  No focality.



Laboratory Data:  Chest x-ray, cardiomegaly with congestion bilateral.  WBC 
11.5, H and H 9.2/28.1.  Sodium 137, potassium 5.6, bicarb 25, BUN 85, 
creatinine 5, calcium of 9.  AST 2000, ALT 3000.



Current Medications:  The patient on include;

1.   Cefuroxime.

2.   Doxycycline.

3.   Midodrine 10 t.i.d.

4.   Plavix.

5.   Metoprolol.

6.   Digoxin.

7.   Ambien.



Assessment And Plan:  

1.   Acute kidney injury secondary to cardiorenal, oliguric, over volume.  I am 
going to continue dialysis.  We will evaluate tomorrow his fluid status to 
decide about the dialysis.

2.   Hyperkalemia.  Going to be corrected with dialysis.

3.   Hyponatremia, dilutional, going to be corrected with dialysis.

4.   Cardiogenic shock.  Continue midodrine.  We will follow up with 

primary.

5.   Congestive liver.  Continue to follow up.



Time spent examining the patient, face-to-face, discussing with the patient, 
reviewing data including radiology and laboratory, placing order, discussing the
case with other team member including nursing and charge nurse, discussing the 
case with other subspecialists including hospitalist 35 minutes.

STEPHEN

DD:  03/09/2022 12:25:59   Voice ID:  [id number]

DT:  03/09/2022 15:04:34   Report ID:  [id number]

JANNETH

## 2022-03-09 NOTE — PN
Date of Progress Note:  03/08/2022



Mr. Ma remains in congestive heart failure, renal failure, and atrial fibrillation.  I agreed wit
h Nephrology to use Milrinone if his blood pressure becomes an issue with dialysis.  He is on metopro
lol, but that has been held because of hypotension.  He was dialyzed yesterday and had 4 L of fluid r
emoved, but continued to be in CHF.  There is another dialysis planned for today.  Regarding his hear
t rhythm, he is intolerant to amiodarone because of liver and pulmonary toxicity.  We are awaiting kingston garcia to [place] for Electrophysiology consultation, but beds are unavailable at this point continue
 present regimen.  We would give him digoxin 0.5 mg IV push once today and we will monitor digoxin le
nataly with his renal failure.





NB/MODL

DD:  03/09/2022 08:00:48Voice ID:  [id number]

DT:  03/09/2022 11:27:31Report ID:  [id number]

## 2022-03-10 LAB
ALBUMIN SERPL BCP-MCNC: 3.5 G/DL (ref 3.4–5)
ALP SERPL-CCNC: 461 U/L (ref 45–117)
AST SERPL W P-5'-P-CCNC: 744 U/L (ref 15–37)
BUN BLD-MCNC: 98 MG/DL (ref 7–18)
COHGB MFR BLDA: 1.9 % (ref 0–1.5)
GLUCOSE SERPLBLD-MCNC: 184 MG/DL (ref 74–106)
HCT VFR BLD CALC: 27.8 % (ref 39.6–49)
LYMPHOCYTES # SPEC AUTO: 0.9 K/UL (ref 0.7–4.9)
MORPHOLOGY BLD-IMP: (no result)
OVALOCYTES BLD QL SMEAR: (no result)
OXYHGB MFR BLDA: 90.7 % (ref 94–97)
PMV BLD: 11.1 FL (ref 7.6–11.3)
POLYCHROMASIA BLD QL SMEAR: (no result)
POTASSIUM SERPL-SCNC: 5.4 MMOL/L (ref 3.5–5.1)
RBC # BLD: 3.26 M/UL (ref 4.33–5.43)
SAO2 % BLDA: 93.7 % (ref 92–98.5)

## 2022-03-10 RX ADMIN — MIDODRINE HYDROCHLORIDE SCH MG: 5 TABLET ORAL at 22:55

## 2022-03-10 RX ADMIN — METOPROLOL TARTRATE SCH MG: 25 TABLET ORAL at 06:09

## 2022-03-10 RX ADMIN — DOXYCYCLINE SCH MG: 100 CAPSULE ORAL at 09:32

## 2022-03-10 RX ADMIN — MORPHINE SULFATE PRN MG: 4 INJECTION, SOLUTION INTRAMUSCULAR; INTRAVENOUS at 03:01

## 2022-03-10 RX ADMIN — MIDODRINE HYDROCHLORIDE SCH MG: 5 TABLET ORAL at 06:09

## 2022-03-10 RX ADMIN — CEFUROXIME AXETIL SCH MG: 250 TABLET, FILM COATED ORAL at 08:09

## 2022-03-10 RX ADMIN — METHYLPREDNISOLONE SODIUM SUCCINATE SCH MG: 40 INJECTION, POWDER, FOR SOLUTION INTRAMUSCULAR; INTRAVENOUS at 08:08

## 2022-03-10 RX ADMIN — METHYLPREDNISOLONE SODIUM SUCCINATE SCH MG: 40 INJECTION, POWDER, FOR SOLUTION INTRAMUSCULAR; INTRAVENOUS at 17:05

## 2022-03-10 RX ADMIN — DOXYCYCLINE SCH MG: 100 CAPSULE ORAL at 20:36

## 2022-03-10 RX ADMIN — METHYLPREDNISOLONE SODIUM SUCCINATE SCH MG: 40 INJECTION, POWDER, FOR SOLUTION INTRAMUSCULAR; INTRAVENOUS at 00:24

## 2022-03-10 RX ADMIN — METOPROLOL TARTRATE SCH MG: 25 TABLET ORAL at 17:05

## 2022-03-10 RX ADMIN — ZOLPIDEM TARTRATE PRN MG: 5 TABLET, FILM COATED ORAL at 22:55

## 2022-03-10 RX ADMIN — MIDODRINE HYDROCHLORIDE SCH MG: 5 TABLET ORAL at 17:05

## 2022-03-10 RX ADMIN — ROSUVASTATIN CALCIUM SCH: 10 TABLET, COATED ORAL at 20:36

## 2022-03-10 RX ADMIN — CEFUROXIME AXETIL SCH MG: 250 TABLET, FILM COATED ORAL at 20:35

## 2022-03-10 RX ADMIN — MORPHINE SULFATE PRN MG: 4 INJECTION, SOLUTION INTRAMUSCULAR; INTRAVENOUS at 15:18

## 2022-03-10 NOTE — P.PN
Date of Service: 03/09/22





Subjective


Patient's clinical symptoms slowly improving.  Liver function test improved.  

Patient still tachypneic.  Still slightly tachycardic.  Being dialyzed at this 

time.  3 to 4 L plan on being removed.  Patient is very slowly improving.





Review of Systems


10-point ROS is otherwise unremarkable





Physical Examination





- Vital Signs


Reviewed





- Physical Exam


General: Alert, respiratory distress; BiPAP in place


HEENT: Atraumatic, PERRLA, EOMI


Neck: Supple, JVD not distended


Respiratory:  diminished breath sounds and basilar crackles


Cardiovascular: Tachyarrhythmia


Gastrointestinal: Abdominal distention no tenderness


Musculoskeletal: Lower extremity edema


Integumentary: No rashes diffuse bruising


Neurological: Normal speech, Normal tone, Normal affect





Assessment & Plan





- Problems (Diagnosis)


(1) Liver failure


Current Visit: Yes   Status: Acute   





(2) Acute kidney failure


Current Visit: Yes   Status: Acute   





(3) Septic shock


Current Visit: Yes   Status: Acute   





(4) Lactic acidosis


Current Visit: Yes   Status: Acute   





(5) Respiratory failure


Current Visit: Yes   Status: Acute   


Qualifiers: 


   Chronicity: acute on chronic 





(6) Acute respiratory distress


Current Visit: No   Status: Acute   





(7) Atrial fibrillation with RVR


Current Visit: No   Status: Acute   





(8) Chronic diastolic heart failure


Current Visit: No   Status: Acute   





- Plan


Continue with plan of care as mentioned below:


1.  Continue with BiPAP support; will try to alternated with high flow oxygen


2.  Hemodialysis per nephrology; continue to remove 3 to 4 L and correcting 

acidosis


3.  Correct acidosis with bicarb


4.  Dig for heart rate control; continue low-dose beta-blocker


5.  Continue with pulmonary, cardiology, and nephrology assistance


6.  Trying to arrange for transfer to a tertiary care facility


7.  Prognosis is poor; gradually improving


8.  Continue with correcting acidosis


9.  Monitor hemodynamics


10.  GI DVT prophylaxis





Discharge Plan: Home


Plan to discharge in: Greater than 2 days





- Advance Directives


Does patient have a Living Will: No


Does patient have a Durable POA for Healthcare: No





- Code Status/Comfort Care


Code Status Assessed: Yes


Code Status: Full Code


Physician Review: Patient Assessed, Agree with Above Assessment and Plan


Critical Care: Yes


Time Spent Managing PTS Care (In Minutes): 35

## 2022-03-10 NOTE — RAD REPORT
EXAM DESCRIPTION:  RAD - Chest Single View - 3/10/2022 11:23 am

 

CLINICAL HISTORY:  COPD

Chest pain.

 

COMPARISON:  Chest Single View dated 3/8/2022; Abdomen 1 View (KUB) dated 3/7/2022; Chest Single View
 dated 3/7/2022; Chest Single View dated 3/6/2022

 

FINDINGS:  Portable technique limits examination quality.

 

Moderate bilateral pulmonary opacities are present, more severe on the left, suggesting pneumonia or 
pulmonary edema. The findings appear slightly progressive on the left since comparative study. The he
art is moderately enlarged with multilead pacer device. Right-sided venous catheter and enteric tube 
remain in place.

 

IMPRESSION:  Mild worsening in left lung aeration is seen since comparative study.

## 2022-03-10 NOTE — P.PN
Date of Service: 03/10/22





Subjective


Patient clinically doing well.  Hemodialysis today.





Review of Systems


10-point ROS is otherwise unremarkable





Physical Examination





- Vital Signs


Reviewed





- Physical Exam


General: Alert, respiratory distress; BiPAP in place


HEENT: Atraumatic, PERRLA, EOMI


Neck: Supple, JVD not distended


Respiratory:  diminished breath sounds and basilar crackles


Cardiovascular: Tachyarrhythmia


Gastrointestinal: Abdominal distention no tenderness


Musculoskeletal: Lower extremity edema


Integumentary: No rashes diffuse bruising


Neurological: Normal speech, Normal tone, Normal affect





Assessment & Plan





- Problems (Diagnosis)


(1) Liver failure


Current Visit: Yes   Status: Acute   





(2) Acute kidney failure


Current Visit: Yes   Status: Acute   





(3) Septic shock


Current Visit: Yes   Status: Acute   





(4) Lactic acidosis


Current Visit: Yes   Status: Acute   





(5) Respiratory failure


Current Visit: Yes   Status: Acute   


Qualifiers: 


   Chronicity: acute on chronic 





(6) Acute respiratory distress


Current Visit: No   Status: Acute   





(7) Atrial fibrillation with RVR


Current Visit: No   Status: Acute   





(8) Chronic diastolic heart failure


Current Visit: No   Status: Acute   





- Plan


Continue with plan of care as mentioned below:


1.  Continue with BiPAP support; will try to alternated with high flow oxygen


2.  Hemodialysis per nephrology; continue to remove 3 to 4 L and correcting 

acidosis


3.  Correct acidosis with bicarb


4.  Dig for heart rate control; continue low-dose beta-blocker


5.  Continue with pulmonary, cardiology, and nephrology assistance


6.  Trying to arrange for transfer to a tertiary care facility


7.  Prognosis is poor; gradually improving


8.  Continue with correcting acidosis


9.  Monitor hemodynamics


10.  GI DVT prophylaxis





Discharge Plan: Home


Plan to discharge in: Greater than 2 days





- Advance Directives


Does patient have a Living Will: No


Does patient have a Durable POA for Healthcare: No





- Code Status/Comfort Care


Code Status Assessed: Yes


Code Status: Full Code


Physician Review: Patient Assessed, Agree with Above Assessment and Plan


Critical Care: Yes


Time Spent Managing PTS Care (In Minutes): 35

## 2022-03-10 NOTE — PN
Date of Progress Note:  03/10/2022



Subjective:  The patient was admitted with multiorgan failure, cardiogenic.  The patient had cardiove
rsion, then placed on amiodarone.  Had liver injury.  The patient still on BiPAP.  The patient receiv
ed 2 sessions of dialysis.  Last session of dialysis yesterday, tolerated well.  The patient had pers
istent hyperkalemia yesterday.  We managed to remove 2 L.



Physical Examination:

Vital Signs:  Blood pressure 124/70, pulse of 94.  The patient still anuric. 

Chest:  Crackles bilateral with decreased entry on the left base. 

Heart:  S1, S2.  Systolic murmur. 

Abdomen:  Soft.  Tenderness on the left lower quadrant:  No guarding or rebound. 

Extremities:  Plus edema. 

Neuro:  The patient opens eyes, grimacing.  Moving 4 extremities.  No focality.



Laboratory Data:  WBC 17.8, H and H 9/27.8, platelets 56.  Sodium 135, potassium 5.4, bicarb 26, BUN 
98, creatinine 5.2, calcium of 9.  , ALT 2100, albumin 3.5.  Chest x-ray, cardiomegaly with co
ngestion with haziness on the left base.



Current Medications:  The patient on includes cefuroxime, doxycycline 100 b.i.d., midodrine 10 t.i.d.
, Plavix, Xarelto, metoprolol 25 b.i.d., __________.



Assessment And Plan:  

1.Acute kidney injury secondary to poor perfusion acute tubular necrosis, anuric, dialysis dependent
.  I am going to continue on the dialysis given the instability of the patient.  No hyperkalemia.  No
 significant acidosis.  I am going to do another session of dialysis tomorrow.  We will give the myra
ent break for today and we will follow up the patient.  The patient is going to be dialyzed on low-po
tassium bath.  We will try to challenge the patient.  We will place the patient on sodium module at t
o avoid hypotension during the dialysis and we will do low temperature.  We will follow up.

2.Hyperkalemia with the presence of abdominal pain and persistent hyperkalemia even after dialysis. 
 The patient is going to be dialyzed on bigger dialyzer.  We will send for stool occult.  Discussed w
pamela Johnson to obtain CT abdomen and pelvis.  We will follow up.

3.Respiratory failure, multifactorial, secondary to chronic obstructive pulmonary disease/cardiogeni
c cardiorenal.  We will challenge the patient tomorrow.

4.Cardiogenic shock.  Continue supportive treatment.  Continue midodrine.

5.Liver failure secondary to amiodarone/congested liver as by primary.  We will follow up.  We will 
try to establish better volume control with the patient.





MA/ROCHELLE

DD:  03/10/2022 11:43:11Voice ID:  [id number]

DT:  03/10/2022 15:23:52Report ID:  [id number]

## 2022-03-10 NOTE — PN
Date of Progress Note:  03/03/2022



Subjective:  The patient was admitted with shortness of breath over volume with acute kidney injury s
econdary to cardiorenal.  The patient found to have severe aortic stenosis, waiting for transfer.



Physical Examination:

Vital Signs:  Blood pressure 102/66, pulse of 88, afebrile. 

Chest:  Faint rales on the left base. 

Heart:  S1, S2.  Systolic murmur. 

Abdomen:  Soft and nontender. 

Extremities:  No ulcer, trace edema. 

Neurologic:  Alert, no focality.



Laboratory Data:  WBC 10.9, H and H 11.3/32.8.  Sodium 138, potassium 4, bicarb 31, BUN 80, creatinin
e 1.2, GFR of 53, calcium 8.6, phos 4.9, albumin 2.2, corrected calcium is 10.  TSH of 2.  Urinalysis
 negative for infection.  PC ratio is still pending.  Renal ultrasound 8.3/9.  No hydronephrosis.



Current Medications:  The patient on, it include:

1.Lovenox.

2.Atorvastatin.

3.Lasix 40 daily.

4.Zofran.

5.Magnesium.



Assessment And Plan:  

1.Acute kidney injury secondary to cardiorenal, still on the over volume side.  I am going to contin
ue current Lasix dose.

2.Hypertension.  We will utilize blood pressure for more diuresis.

3.Aortic stenosis as by primary.

4.Congestive heart failure with exacerbation.  We will continue diuresis.  Will follow up with the marli pelaez.





MA/ROCHELLE

DD:  03/03/2022 11:39:15Voice ID:  [id number]

DT:  03/03/2022 16:59:28Report ID:  [id number]

## 2022-03-11 LAB
ALBUMIN SERPL BCP-MCNC: 3.2 G/DL (ref 3.4–5)
ALBUMIN SERPL BCP-MCNC: 3.6 G/DL (ref 3.4–5)
ALP SERPL-CCNC: 375 U/L (ref 45–117)
ALT SERPL W P-5'-P-CCNC: 1504 U/L (ref 12–78)
AST SERPL W P-5'-P-CCNC: 260 U/L (ref 15–37)
BUN BLD-MCNC: 181 MG/DL (ref 7–18)
BUN BLD-MCNC: 98 MG/DL (ref 7–18)
GLUCOSE SERPLBLD-MCNC: 203 MG/DL (ref 74–106)
GLUCOSE SERPLBLD-MCNC: 235 MG/DL (ref 74–106)
HCT VFR BLD CALC: 28.3 % (ref 39.6–49)
INR BLD: 1.32
LYMPHOCYTES # SPEC AUTO: 1.2 K/UL (ref 0.7–4.9)
MAGNESIUM SERPL-MCNC: 2.7 MG/DL (ref 1.8–2.4)
MAGNESIUM SERPL-MCNC: 3.9 MG/DL (ref 1.8–2.4)
MORPHOLOGY BLD-IMP: (no result)
PMV BLD: 11 FL (ref 7.6–11.3)
POLYCHROMASIA BLD QL SMEAR: (no result)
POTASSIUM SERPL-SCNC: 4.7 MMOL/L (ref 3.5–5.1)
POTASSIUM SERPL-SCNC: 6.3 MMOL/L (ref 3.5–5.1)
RBC # BLD: 3.33 M/UL (ref 4.33–5.43)

## 2022-03-11 RX ADMIN — CEFUROXIME AXETIL SCH MG: 250 TABLET, FILM COATED ORAL at 20:17

## 2022-03-11 RX ADMIN — METHYLPREDNISOLONE SODIUM SUCCINATE SCH MG: 40 INJECTION, POWDER, FOR SOLUTION INTRAMUSCULAR; INTRAVENOUS at 00:07

## 2022-03-11 RX ADMIN — DOXYCYCLINE SCH MG: 100 CAPSULE ORAL at 08:00

## 2022-03-11 RX ADMIN — ROSUVASTATIN CALCIUM SCH MG: 10 TABLET, COATED ORAL at 20:17

## 2022-03-11 RX ADMIN — MIDODRINE HYDROCHLORIDE SCH MG: 5 TABLET ORAL at 23:12

## 2022-03-11 RX ADMIN — METOPROLOL TARTRATE SCH MG: 25 TABLET ORAL at 06:30

## 2022-03-11 RX ADMIN — METHYLPREDNISOLONE SODIUM SUCCINATE SCH MG: 40 INJECTION, POWDER, FOR SOLUTION INTRAMUSCULAR; INTRAVENOUS at 17:41

## 2022-03-11 RX ADMIN — MIDODRINE HYDROCHLORIDE SCH MG: 5 TABLET ORAL at 08:01

## 2022-03-11 RX ADMIN — DOXYCYCLINE SCH MG: 100 CAPSULE ORAL at 20:18

## 2022-03-11 RX ADMIN — ALBUMIN (HUMAN) SCH MLS: 5 SOLUTION INTRAVENOUS at 13:00

## 2022-03-11 RX ADMIN — METOPROLOL TARTRATE SCH MG: 25 TABLET ORAL at 17:41

## 2022-03-11 RX ADMIN — CEFUROXIME AXETIL SCH MG: 250 TABLET, FILM COATED ORAL at 08:00

## 2022-03-11 RX ADMIN — MORPHINE SULFATE PRN MG: 4 INJECTION, SOLUTION INTRAMUSCULAR; INTRAVENOUS at 04:45

## 2022-03-11 RX ADMIN — HEPARIN SODIUM PRN UNIT: 1000 INJECTION, SOLUTION INTRAVENOUS; SUBCUTANEOUS at 13:42

## 2022-03-11 RX ADMIN — ALBUMIN (HUMAN) SCH MLS: 5 SOLUTION INTRAVENOUS at 11:00

## 2022-03-11 RX ADMIN — MIDODRINE HYDROCHLORIDE SCH MG: 5 TABLET ORAL at 14:41

## 2022-03-11 RX ADMIN — METHYLPREDNISOLONE SODIUM SUCCINATE SCH MG: 40 INJECTION, POWDER, FOR SOLUTION INTRAMUSCULAR; INTRAVENOUS at 08:00

## 2022-03-11 NOTE — RAD REPORT
EXAM DESCRIPTION:  CTAbdomen   Pelvis W Contrast - 3/11/2022 9:55 am

 

CLINICAL HISTORY:

ischemic colitis

 

COMPARISON:  Chest Single View dated 3/11/2022; Chest For Pe Angio dated 3/3/2022

 

TECHNIQUE:  CT of the abdomen and pelvis was performed with contrast.

 

All CT scans are performed using dose optimization technique as appropriate and may include automated
 exposure control or mA/KV adjustment according to patient size.

 

FINDINGS:  Lower chest: Widespread ground-glass opacities are present in the lung bases. Cardiomegaly
. Aortic valve prosthesis. Pacemaker. Pleural effusions have decreased.

Liver: No acute abnormality or suspicious lesions.

Biliary: No biliary ductal dilatation.

Stomach: Enteric tube in the distal stomach.

Duodenum: No significant focal abnormality.

Pancreas: No significant abnormality.

Spleen: No significant abnormality.

Adrenal: No suspicious lesions.

Kidney/ureter: No hydronephrosis. No renal calculi.

Retroperitoneum: No retroperitoneal adenopathy.

Vascular: No aneurysm. Atherosclerosis.

Bowel: Diverticulosis..

Peritoneum: No ascites or free air.

Bladder: The bladder is decompressed around a Figueroa catheter.

Reproductive: No adnexal masses.

Bones: No acute fracture. Intramedullary jeniffer in the left proximal femur. Multilevel degenerative chua
ges are present in the spine. Retrolisthesis of L1 on L2 noted.

Other: n/a

 

IMPRESSION:  No acute intra-abdominal or pelvic finding. Specifically, no findings to suggest ischemi
c colitis.

 

Widespread airspace disease in lung bases could reflect edema and/or pneumonia. Pleural effusions hav
e diminished since the CT from 03/03/2022 .

## 2022-03-11 NOTE — RAD REPORT
EXAM DESCRIPTION:  RAD - Chest Single View - 3/11/2022 6:13 am

 

CLINICAL HISTORY:  pneumonia

 

COMPARISON:  Chest Single View dated 3/10/2022; Chest Single View dated 3/8/2022; Abdomen 1 View (KUB
) dated 3/7/2022; Chest Single View dated 3/7/2022

 

FINDINGS:  Lines: Dialysis catheter. Feeding tube. Pacemaker.

Lungs: Similar widespread pulmonary opacities.

Pleural: Probable small pleural effusions.

Cardiac: Cardiomegaly. Aortic valve prosthesis.

Bones: No acute fractures.

Other:

 

IMPRESSION:  Grossly similar pulmonary edema. Superimposed pneumonia difficult to exclude. Support ap
paratus in stable positioning.

## 2022-03-11 NOTE — P.PN
Subjective


Date of Service: 03/11/22


Chief Complaint: Respiratory failure


Subjective: Other (Remains on bipap.  Received HD today.)





Physical Examination





- Vital Signs


Temperature: 97.6 F


Blood Pressure: 117/60


Pulse: 85


Respirations: 30


Pulse Ox (%): 92





- Physical Exam


General: Acute distress


HEENT: Atraumatic, Normocephalic, Other (+bipap)


Neck: Supple, JVD not distended


Respiratory: Other (Symmetric chest expansion)


Cardiovascular: No rubs, No murmurs


Gastrointestinal: Soft and benign, No guarding


Musculoskeletal: No clubbing


Integumentary: No warmth


Neurological: Other (+lethargy)


Lymphatics: No axilla or inguinal lymphadenopathy


Urinary: Figueroa catheter


External genitalia: Deferred


Rectal: Deferred





- Studies


Medications List Reviewed: Yes





Assessment And Plan





- Plan





# ORACIO 2/2 ischemic ATN


Received HD today


Reassess daily for further HD needs


Strict I/O


Monitor renal panel





# Hyperkalemia


HD as above


Recheck lytes this pm at least 2 hrs after end of HD





# Septic shock likely pulmo source


CT A/P unremarkable, no e/o ischemic bowel


Abx


F/u cultures


Cont midodrine





# Acute respi failure 2/2 ? PNA


On bipap currently


Per other services





# Abnormal LFT


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


Amio d/c'ed previously


Monitor





# Afib w/ RVR


Per other services


Cont Metoprolol





Physician Review: Patient Assessed, Agree with Above Assessment and Plan

## 2022-03-12 LAB
ALBUMIN SERPL BCP-MCNC: 3.4 G/DL (ref 3.4–5)
ALP SERPL-CCNC: 272 U/L (ref 45–117)
ALT SERPL W P-5'-P-CCNC: 936 U/L (ref 12–78)
AST SERPL W P-5'-P-CCNC: 251 U/L (ref 15–37)
BUN BLD-MCNC: 143 MG/DL (ref 7–18)
GLUCOSE SERPLBLD-MCNC: 245 MG/DL (ref 74–106)
HCT VFR BLD CALC: 21.8 % (ref 39.6–49)
HCT VFR BLD CALC: 23.9 % (ref 39.6–49)
HYPOCHROMIA BLD QL: (no result)
INR BLD: 1.41
LYMPHOCYTES # SPEC AUTO: 0.5 K/UL (ref 0.7–4.9)
LYMPHOCYTES # SPEC AUTO: 0.6 K/UL (ref 0.7–4.9)
MAGNESIUM SERPL-MCNC: 3.4 MG/DL (ref 1.8–2.4)
MORPHOLOGY BLD-IMP: (no result)
PMV BLD: 10.8 FL (ref 7.6–11.3)
PMV BLD: 11.2 FL (ref 7.6–11.3)
POTASSIUM SERPL-SCNC: 5.3 MMOL/L (ref 3.5–5.1)
RBC # BLD: 2.53 M/UL (ref 4.33–5.43)
RBC # BLD: 2.76 M/UL (ref 4.33–5.43)

## 2022-03-12 RX ADMIN — MIDODRINE HYDROCHLORIDE SCH MG: 5 TABLET ORAL at 23:56

## 2022-03-12 RX ADMIN — DOXYCYCLINE SCH MG: 100 CAPSULE ORAL at 21:08

## 2022-03-12 RX ADMIN — METHYLPREDNISOLONE SODIUM SUCCINATE SCH MG: 40 INJECTION, POWDER, FOR SOLUTION INTRAMUSCULAR; INTRAVENOUS at 00:37

## 2022-03-12 RX ADMIN — METHYLPREDNISOLONE SODIUM SUCCINATE SCH MG: 40 INJECTION, POWDER, FOR SOLUTION INTRAMUSCULAR; INTRAVENOUS at 08:30

## 2022-03-12 RX ADMIN — METOPROLOL TARTRATE SCH MG: 25 TABLET ORAL at 17:28

## 2022-03-12 RX ADMIN — MIDODRINE HYDROCHLORIDE SCH MG: 5 TABLET ORAL at 08:31

## 2022-03-12 RX ADMIN — CEFUROXIME AXETIL SCH MG: 250 TABLET, FILM COATED ORAL at 21:08

## 2022-03-12 RX ADMIN — METHYLPREDNISOLONE SODIUM SUCCINATE SCH MG: 40 INJECTION, POWDER, FOR SOLUTION INTRAMUSCULAR; INTRAVENOUS at 17:28

## 2022-03-12 RX ADMIN — ROSUVASTATIN CALCIUM SCH: 10 TABLET, COATED ORAL at 21:00

## 2022-03-12 RX ADMIN — Medication SCH ML: at 02:33

## 2022-03-12 RX ADMIN — METOPROLOL TARTRATE SCH MG: 25 TABLET ORAL at 06:07

## 2022-03-12 RX ADMIN — DOXYCYCLINE SCH MG: 100 CAPSULE ORAL at 08:31

## 2022-03-12 RX ADMIN — CEFUROXIME AXETIL SCH MG: 250 TABLET, FILM COATED ORAL at 08:31

## 2022-03-12 RX ADMIN — MIDODRINE HYDROCHLORIDE SCH MG: 5 TABLET ORAL at 14:11

## 2022-03-12 NOTE — RAD REPORT
EXAM DESCRIPTION:  RAD - Chest Single View - 3/12/2022 7:10 am

 

CLINICAL HISTORY:  pneumonia

Chest pain.

 

COMPARISON:  Chest Single View dated 3/11/2022; Chest Single View dated 3/10/2022; Chest Single View 
dated 3/8/2022; Abdomen 1 View (KUB) dated 3/7/2022

 

FINDINGS:  Portable technique limits examination quality.

 

Moderately severe bilateral pulmonary opacities are present likely representing pulmonary edema or pn
eumonia. Overall, no real changes occurred since yesterday's study. The heart is enlarged with a dual
 lead pacer device present. Right-sided venous catheter and enteric tube are stable.

 

IMPRESSION:  Stable chest since 03/11/2022.

## 2022-03-12 NOTE — P.PN
Subjective


Date of Service: 03/12/22


Chief Complaint: Respiratory failure


Subjective: Other (Received HD today.  Had intradialytic hypotension.)





Physical Examination





- Vital Signs


Temperature: 97 F


Blood Pressure: 130/65


Pulse: 99


Respirations: 22


Pulse Ox (%): 96





- Physical Exam


General: Acute distress


HEENT: Atraumatic, Normocephalic


Neck: Supple


Respiratory: Other (Symmetric chest expansion)


Cardiovascular: No rubs, No murmurs


Gastrointestinal: Soft and benign, No guarding


Musculoskeletal: No clubbing


Integumentary: No warmth


Neurological: Other (+lethargy)


Urinary: Figueroa catheter, Other


External genitalia: Deferred


Rectal: Deferred





- Studies


Medications List Reviewed: Yes





Assessment And Plan





- Plan





# ORACIO 2/2 ischemic ATN


Received HD today.  Has intradialytic hypotension


Next HD tomorrow w/ 1u pRBC transfusion w/ HD.  Give Midodrine 15 mg po pre-HD


Reassess daily for further HD needs


Strict I/O


Monitor renal panel





# Hyperkalemia


HD as above


Recheck lytes this pm at least 2 hrs after end of HD





# Septic shock likely pulmo source


CT A/P unremarkable, no e/o ischemic bowel


Abx


F/u cultures


Cont midodrine





# Acute respi failure 2/2 ? PNA


On bipap currently


Per other services





# Abnormal LFT


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


Amio d/c'ed previously


Monitor





# Anemia


1u pRBC transf tonight 


2nd unit pRBC transfusion tomorrow w/ HD





# Afib w/ RVR


Per other services


Cont Metoprolol





Physician Review: Patient Assessed, Agree with Above Assessment and Plan

## 2022-03-13 LAB
ALBUMIN SERPL BCP-MCNC: 3.8 G/DL (ref 3.4–5)
ALP SERPL-CCNC: 273 U/L (ref 45–117)
ALT SERPL W P-5'-P-CCNC: 879 U/L (ref 12–78)
AST SERPL W P-5'-P-CCNC: 569 U/L (ref 15–37)
BUN BLD-MCNC: 126 MG/DL (ref 7–18)
COHGB MFR BLDA: 2 % (ref 0–1.5)
GLUCOSE SERPLBLD-MCNC: 240 MG/DL (ref 74–106)
HCT VFR BLD CALC: 25.4 % (ref 39.6–49)
INR BLD: 1.41
LYMPHOCYTES # SPEC AUTO: 0.8 K/UL (ref 0.7–4.9)
MAGNESIUM SERPL-MCNC: 3.3 MG/DL (ref 1.8–2.4)
OXYHGB MFR BLDA: 95.4 % (ref 94–97)
PMV BLD: 11 FL (ref 7.6–11.3)
POTASSIUM SERPL-SCNC: 4.9 MMOL/L (ref 3.5–5.1)
RBC # BLD: 2.92 M/UL (ref 4.33–5.43)
SAO2 % BLDA: 99 % (ref 92–98.5)

## 2022-03-13 PROCEDURE — 5A1945Z RESPIRATORY VENTILATION, 24-96 CONSECUTIVE HOURS: ICD-10-PCS

## 2022-03-13 PROCEDURE — 0BH17EZ INSERTION OF ENDOTRACHEAL AIRWAY INTO TRACHEA, VIA NATURAL OR ARTIFICIAL OPENING: ICD-10-PCS

## 2022-03-13 RX ADMIN — ROSUVASTATIN CALCIUM SCH: 10 TABLET, COATED ORAL at 20:34

## 2022-03-13 RX ADMIN — DOXYCYCLINE SCH MG: 100 CAPSULE ORAL at 08:21

## 2022-03-13 RX ADMIN — MIDODRINE HYDROCHLORIDE SCH MG: 5 TABLET ORAL at 08:22

## 2022-03-13 RX ADMIN — SODIUM CHLORIDE SCH MLS: 9 INJECTION, SOLUTION INTRAVENOUS at 18:51

## 2022-03-13 RX ADMIN — MIDODRINE HYDROCHLORIDE SCH MG: 5 TABLET ORAL at 23:32

## 2022-03-13 RX ADMIN — MIDODRINE HYDROCHLORIDE SCH: 5 TABLET ORAL at 15:00

## 2022-03-13 RX ADMIN — METOPROLOL TARTRATE SCH MG: 25 TABLET ORAL at 05:51

## 2022-03-13 RX ADMIN — METOPROLOL TARTRATE SCH MG: 25 TABLET ORAL at 18:51

## 2022-03-13 RX ADMIN — Medication SCH ML: at 04:35

## 2022-03-13 RX ADMIN — FAMOTIDINE SCH MG: 10 INJECTION, SOLUTION INTRAVENOUS at 21:48

## 2022-03-13 RX ADMIN — SEVELAMER CARBONATE SCH MG: 800 TABLET, FILM COATED ORAL at 18:51

## 2022-03-13 RX ADMIN — CEFUROXIME AXETIL SCH MG: 250 TABLET, FILM COATED ORAL at 08:21

## 2022-03-13 RX ADMIN — SODIUM CHLORIDE SCH MLS: 9 INJECTION, SOLUTION INTRAVENOUS at 10:54

## 2022-03-13 NOTE — P.PN
Date of Service: 03/12/22





Subjective


Patient clinically doing better.  Stayed off of the BiPAP for about an hour.  

Patient himself stated that he wanted to get back on it.  His wife feels like it

makes him feel better because he uses it to sleep at night for many years.  He 

is maintaining his oxygenation fairly well on the high flow oxygen.  Chest x-ray

still with significant infiltrates bilaterally.  Acidosis has improved.  LFTs 

are improved.





Review of Systems


10-point ROS is otherwise unremarkable





Physical Examination





- Vital Signs


Reviewed





- Physical Exam


General: Alert, respiratory distress; BiPAP in place


Respiratory:  diminished breath sounds and basilar crackles


Cardiovascular: Tachyarrhythmia


Gastrointestinal: Abdominal distention no tenderness


Musculoskeletal: Lower extremity edema


Integumentary: No rashes diffuse bruising


Neurological: Normal speech, Normal tone, Normal affect





Assessment & Plan





- Problems (Diagnosis)


(1) Liver failure


Current Visit: Yes   Status: Acute   





(2) Acute kidney failure


Current Visit: Yes   Status: Acute   





(3) Septic shock


Current Visit: Yes   Status: Acute   





(4) Lactic acidosis


Current Visit: Yes   Status: Acute   





(5) Respiratory failure


Current Visit: Yes   Status: Acute   


Qualifiers: 


   Chronicity: acute on chronic 





(6) Acute respiratory distress


Current Visit: No   Status: Acute   





(7) Atrial fibrillation with RVR


Current Visit: No   Status: Acute   





(8) Chronic diastolic heart failure


Current Visit: No   Status: Acute   





- Plan


Continue with plan of care as mentioned below:


1.  Continue with BiPAP support-slowly weaning off; try to alternate with high 

flow O2


2.  Hemodialysis today; H&H was low and will transfuse 1 unit


3.  Monitor acidosis.  Overall, LFTs continue to improve.  Acidosis stable


4.  Heart rate is stabilizing; staying in the 90s


5.  Appreciate pulmonary, cardiology, and nephrology assistance


6.  Trying to arrange for transfer to a tertiary care facility; currently, 

clinical status is slowly improving.


7.  Prognosis is poor but at this time patient wants everything done; patient 

has had TAVR procedure, watchman procedure, Covid infection, AND all over the 

last few months.


8. GI DVT prophylaxis





Discharge Plan: Pending


Plan to discharge in: Greater than 2 days





- Advance Directives


Does patient have a Living Will: No


Does patient have a Durable POA for Healthcare: No





- Code Status/Comfort Care


Code Status Assessed: Yes


Code Status: Full Code


Physician Review: Patient Assessed, Agree with Above Assessment and Plan


Critical Care: Yes


Time Spent Managing PTS Care (In Minutes): 55

## 2022-03-13 NOTE — P.PN
Subjective


Date of Service: 03/13/22


Chief Complaint: Respiratory failure


No significant improvement patient is on FiO2 50% intermittently responsive as 

tube feeds White count is now elevated





Review of Systems


is unable to be obtained





Physical Examination





- Vital Signs


Temperature: 97.3 F


Blood Pressure: 136/64


Pulse: 96


Respirations: 29


Pulse Ox (%): 91





- Physical Exam


General: Unresponsive


Neck: Supple


Respiratory: Crackles/rales


Cardiovascular: Normal S1 S2, Edema


Gastrointestinal: Soft and benign





- Studies


Medications List Reviewed: Yes





Assessment And Plan





- Current Problems (Diagnosis)


(1) Respiratory failure


Current Visit: Yes   Status: Acute   


Plan: 


Respiratory failure presumed amiodarone toxicity change to p.o. prednisone white

count elevated changed to meropenem DC p.o. antibiotics blood cultures continue 

with tube feeds repeat chest x-ray tomorrow increase EPAP high risk for bleeding

reduce the dose of Xarelto to 10 mg significant drop of blood pressure during 

dialysis consider vancomycin if the white count persists


Qualifiers: 


   Chronicity: acute on chronic 


Physician Review: Patient Assessed, Agree with Above Assessment and Plan

## 2022-03-13 NOTE — P.PN
Subjective


Date of Service: 03/14/22


Chief Complaint: Respiratory failure


Subjective: Other (Pt intubated today.)





Physical Examination





- Vital Signs


Temperature: 97.3 F


Blood Pressure: 136/64


Pulse: 96


Respirations: 29


Pulse Ox (%): 91





- Physical Exam


General: Other (appears acutely ill)


HEENT: Atraumatic, Normocephalic, Other (+ET tube)


Neck: Supple, JVD not distended


Respiratory: Other (Symmetric chest expansion)


Cardiovascular: No rubs, No murmurs


Gastrointestinal: Soft and benign, No guarding


Musculoskeletal: No clubbing, Swelling


Integumentary: No warmth


Neurological: Other (sedated)


Lymphatics: No axilla or inguinal lymphadenopathy


Urinary: Dialysis catheter, Figueroa catheter


External genitalia: Deferred


Rectal: Deferred





- Studies


Medications List Reviewed: Yes





Assessment And Plan





- Plan





# ORACIO 2/2 ischemic ATN


Received HD/SLEDD today.  Has intradialytic hypotension


Next HD SLEDD mode tomorrow.  Give Midodrine 15 mg po pre-HD


Reassess daily for further HD needs


Strict I/O


Monitor renal panel





# Hyperkalemia


Correction via HD





# Shock 2/2 pneumonia, aggravated by pulmonary Htn & rapid afib, r/o adrenal 

insuffciency


Chest CT +extensive bilat airspace dse, +pulmo Htn w/ dilated main pulmo artery


CT A/P unremarkable, no e/o ischemic bowel


TTE in Feb 2022 showed dec LVEF 45-50%, dilated LA, AV bioprosthesis, pulmo Htn 

w/ RVSP 55-60 mmHg


Abx


F/u cultures


Cont midodrine 10 mg po q8h


F/u cosyntropin stim test


F/u 8am serum cortisol + ACTH





# Acute respi failure 2/2 PNA, aggravated by pulmo edema from pulmo Htn & rapid 

afib


Intubated on 3/13


Per other services





# Abnormal LFT


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


Amio d/c'ed previously


Monitor





# Anemia


S/p pRBC transfusion on 3/12 & 3/13





# Afib w/ RVR


Per other services


Cont Metoprolol





Physician Review: Patient Assessed, Agree with Above Assessment and Plan

## 2022-03-13 NOTE — P.PN
Date of Service: 03/13/22





Subjective


She ended up requiring intubation.  We attempted to dialyze the patient but we 

were able to remove very little fluid.  Patient's respiratory status was not 

improving after dialysis.  Nursing staff notified pulmonary and decision was 

made to proceed with intubation.  





Hospital course: 


Patient is a 71-year-old gentleman who came to the hospital with atrial 

fibrillation with rapid ventricular response.  Patient has had multiple 

interventions over the last few months including watchman procedure, TAVR 

procedure, and he has been infected with Covid and sent home with oxygen.  

Patient was not needing oxygen but a week ago patient went into atrial 

fibrillation with rapid ventricular response.  Patient was started on amiodarone

drip.  Patient became more short of breath and patient developed hepatotoxicity.

 Liver function testing was elevated as well as bilirubin.  Patient became 

severely acidotic.  Patient was on significant BiPAP support and there was 

thought of intubation.  Family still wants patient to remain a full code.  

However, patient did start turning around as liver function testing and acidosis

improved.  However, patient did develop renal failure.  Patient was started on 

hemodialysis.  Patient is dialyzed every other day.  However repeating dialysis 

today because patient was anemic and we had to give her 1 unit of packed red 

blood cells. Scheduled for hemodialysis again today.  Potassium level is stable.

 Patient was remaining hyperkalemic for a time and we did a CT of the abdomen 

and pelvis as he has some tenderness but this came back unremarkable for any 

intra-abdominal issues.  Still shows significant lung inflammation.  This is 

probably related to multifactorial including Covid infection, amiodarone 

toxicity, and pneumonia.  We need to know the H&H today to see if we can give 

additional unit of blood prior to hemodialysis or during hemodialysis.  Continue

weaning off BiPAP support.  Liver function testing bumped up a little bit but 

may be related to hemoconcentration.  Patient also with a little bit of anxiety 

today.  May need to consider Precedex drip.  Still waiting for transfer if he 

gets accepted to a tertiary care facility.





Review of Systems


10-point ROS is otherwise unremarkable





Physical Examination





- Vital Signs


Reviewed





- Physical Exam


General: Alert, respiratory distress; BiPAP in place


HEENT: Atraumatic, PERRLA, EOMI


Neck: Supple, JVD not distended


Respiratory:  diminished breath sounds and basilar crackles


Cardiovascular: Tachyarrhythmia


Gastrointestinal: Slightly distended and tender.  No rebound or guarding


Musculoskeletal: Lower extremity edema


Integumentary: No rashes diffuse bruising


Neurological: Normal speech, Normal tone, Normal affect





Assessment & Plan





- Problems (Diagnosis)


(1) Liver failure


Current Visit: Yes   Status: Acute   





(2) Acute kidney failure


Current Visit: Yes   Status: Acute   





(3) Septic shock


Current Visit: Yes   Status: Acute   





(4) Lactic acidosis


Current Visit: Yes   Status: Acute   





(5) Respiratory failure


Current Visit: Yes   Status: Acute   


Qualifiers: 


   Chronicity: acute on chronic 





(6) Acute respiratory distress


Current Visit: No   Status: Acute   





(7) Atrial fibrillation with RVR


Current Visit: No   Status: Acute   





(8) Chronic diastolic heart failure


Current Visit: No   Status: Acute   





- Plan


Continue with plan of care as mentioned below:


1.  Mechanical ventilation


2.  Hemodialysis per nephrology; awaiting for H&H and will give 1 unit of packed

red blood cells during dialysis today


3.  Liver function testing is stable.  Slightly elevated from yesterday but may 

be hemoconcentrated.


4.  Cardiac status is stable;  Heart rate controlled with low-dose beta-blocker 

therapy


5.  Appreciate pulmonary, cardiology, and nephrology assistance


6.  Still awaiting transfer.  Been waiting for over a week


7.  Prognosis is poor; but at this time, patient is gradually improving; anemia 

is the new thing and we are putting him on a Protonix drip and may need to taper

the steroids


8.  Nutritionally were continuing with feedings; nepro continuous feeds


9.  Neurologically patient is following commands but has myopathy from being 

sick for so long.  Will probably benefit from rehab if he continues to improve. 

He has a little anxious and may need some Precedex if this continues


10.  GI DVT prophylaxis





Discharge Plan: Home


Plan to discharge in: Greater than 2 days





- Advance Directives


Does patient have a Living Will: No


Does patient have a Durable POA for Healthcare: No





- Code Status/Comfort Care


Code Status Assessed: Yes


Code Status: Full Code


Physician Review: Patient Assessed, Agree with Above Assessment and Plan


Critical Care: Yes


Time Spent Managing PTS Care (In Minutes): 35

## 2022-03-13 NOTE — RAD REPORT
EXAM DESCRIPTION:  RAD - Chest Single View - 3/13/2022 6:44 pm

 

CLINICAL HISTORY:  s/p intubation, verify ETT placement

 

COMPARISON:  Chest Single View dated 3/12/2022; Chest Single View dated 3/11/2022; Chest Single View 
dated 3/10/2022; Chest Single View dated 3/8/2022; Abdomen   Pelvis W Contrast dated 3/11/2022

 

FINDINGS:  Lines: Interval intubation. The tip of the endotracheal tube is at the aortic arch in sati
sfactory position. Feeding tube below the diaphragm. Right subclavian approach dialysis catheter with
 tip overlying the superior cavoatrial junction.

Lungs: Similar diffuse prominence of the pulmonary interstitium.

Pleural: No significant pleural effusions or pneumothorax.

Cardiac: Cardiomegaly. Aortic valve prosthesis. Pacemaker.

Bones: No acute fractures.

Other:

 

IMPRESSION:  ET tube in satisfactory position at the aortic arch. Feeding tube below the diaphragm.

 

Interstitial edema that is similar.

## 2022-03-13 NOTE — P.PN
Date of Service: 03/11/22





Subjective


Believe trying to wean off of the BiPAP.  Patient was able to keep high flow 

oxygen for about an hour.  He does get a little tachypneic.  Continue with 

hemodialysis per nephrology schedule.  Patient did not get dialyzed today.  Labs

are improving.





Review of Systems


10-point ROS is otherwise unremarkable





Physical Examination





- Vital Signs


Reviewed





- Physical Exam


General: Alert, respiratory distress; BiPAP in place


Respiratory:  diminished breath sounds and basilar crackles


Cardiovascular: Tachyarrhythmia


Gastrointestinal: Abdominal distention no tenderness


Musculoskeletal: Lower extremity edema


Integumentary: No rashes diffuse bruising


Neurological: Normal speech, Normal tone, Normal affect





Assessment & Plan





- Problems (Diagnosis)


(1) Liver failure


Current Visit: Yes   Status: Acute   





(2) Acute kidney failure


Current Visit: Yes   Status: Acute   





(3) Septic shock


Current Visit: Yes   Status: Acute   





(4) Lactic acidosis


Current Visit: Yes   Status: Acute   





(5) Respiratory failure


Current Visit: Yes   Status: Acute   


Qualifiers: 


   Chronicity: acute on chronic 





(6) Acute respiratory distress


Current Visit: No   Status: Acute   





(7) Atrial fibrillation with RVR


Current Visit: No   Status: Acute   





(8) Chronic diastolic heart failure


Current Visit: No   Status: Acute   





- Plan


Continue with plan of care as mentioned below:


1.  Continue with BiPAP support; try to alternate with high flow O2


2.  Hemodialysis per nephrology; hold it today and will dialyze in the morning


3.  Monitor acidosis.  Improving since LFTs were improved


4.  Heart rate is stabilizing


5.  Appreciate pulmonary, cardiology, and nephrology assistance


6.  Trying to arrange for transfer to a tertiary care facility; currently, 

clinical status is slowly improving.


7.  Prognosis is poor but at this time patient wants everything done


8.  Continue with trying to achieve rate control


9.  GI DVT prophylaxis





Discharge Plan: Pending


Plan to discharge in: Greater than 2 days





- Advance Directives


Does patient have a Living Will: No


Does patient have a Durable POA for Healthcare: No





- Code Status/Comfort Care


Code Status Assessed: Yes


Code Status: Full Code


Physician Review: Patient Assessed, Agree with Above Assessment and Plan


Critical Care: Yes


Time Spent Managing PTS Care (In Minutes): 55

## 2022-03-14 LAB
ALBUMIN SERPL BCP-MCNC: 3.6 G/DL (ref 3.4–5)
ALP SERPL-CCNC: 202 U/L (ref 45–117)
ALT SERPL W P-5'-P-CCNC: 575 U/L (ref 12–78)
AST SERPL W P-5'-P-CCNC: 260 U/L (ref 15–37)
BUN BLD-MCNC: 113 MG/DL (ref 7–18)
GLUCOSE SERPLBLD-MCNC: 149 MG/DL (ref 74–106)
HCT VFR BLD CALC: 20.3 % (ref 39.6–49)
INR BLD: 1.39
LYMPHOCYTES # SPEC AUTO: 0.8 K/UL (ref 0.7–4.9)
MAGNESIUM SERPL-MCNC: 3 MG/DL (ref 1.8–2.4)
PMV BLD: 10.8 FL (ref 7.6–11.3)
POTASSIUM SERPL-SCNC: 4.6 MMOL/L (ref 3.5–5.1)
RBC # BLD: 2.33 M/UL (ref 4.33–5.43)
UA DIPSTICK W REFLEX MICRO PNL UR: (no result)
URINE UROTHELIAL CELLS: <5 /HPF

## 2022-03-14 RX ADMIN — SEVELAMER CARBONATE SCH MG: 800 TABLET, FILM COATED ORAL at 16:50

## 2022-03-14 RX ADMIN — SODIUM CHLORIDE SCH MLS: 9 INJECTION, SOLUTION INTRAVENOUS at 16:50

## 2022-03-14 RX ADMIN — SEVELAMER CARBONATE SCH MG: 800 TABLET, FILM COATED ORAL at 10:43

## 2022-03-14 RX ADMIN — METHYLPREDNISOLONE SODIUM SUCCINATE SCH MG: 40 INJECTION, POWDER, FOR SOLUTION INTRAMUSCULAR; INTRAVENOUS at 23:05

## 2022-03-14 RX ADMIN — METOPROLOL TARTRATE SCH: 25 TABLET ORAL at 06:00

## 2022-03-14 RX ADMIN — SEVELAMER CARBONATE SCH MG: 800 TABLET, FILM COATED ORAL at 14:59

## 2022-03-14 RX ADMIN — FENTANYL CITRATE PRN MCG: 50 INJECTION, SOLUTION INTRAMUSCULAR; INTRAVENOUS at 11:54

## 2022-03-14 RX ADMIN — FENTANYL CITRATE PRN MCG: 50 INJECTION, SOLUTION INTRAMUSCULAR; INTRAVENOUS at 14:00

## 2022-03-14 RX ADMIN — MIDODRINE HYDROCHLORIDE SCH MG: 5 TABLET ORAL at 14:59

## 2022-03-14 RX ADMIN — SODIUM CHLORIDE SCH MLS: 9 INJECTION, SOLUTION INTRAVENOUS at 10:42

## 2022-03-14 RX ADMIN — FAMOTIDINE SCH MG: 10 INJECTION, SOLUTION INTRAVENOUS at 23:05

## 2022-03-14 RX ADMIN — FAMOTIDINE SCH MG: 10 INJECTION, SOLUTION INTRAVENOUS at 10:43

## 2022-03-14 RX ADMIN — METOPROLOL TARTRATE SCH: 25 TABLET ORAL at 18:00

## 2022-03-14 RX ADMIN — SODIUM CHLORIDE SCH MLS: 9 INJECTION, SOLUTION INTRAVENOUS at 01:11

## 2022-03-14 RX ADMIN — METHYLPREDNISOLONE SODIUM SUCCINATE SCH MG: 40 INJECTION, POWDER, FOR SOLUTION INTRAMUSCULAR; INTRAVENOUS at 10:43

## 2022-03-14 RX ADMIN — MIDODRINE HYDROCHLORIDE SCH MG: 5 TABLET ORAL at 10:43

## 2022-03-14 RX ADMIN — ALBUMIN (HUMAN) SCH MLS: 5 SOLUTION INTRAVENOUS at 19:40

## 2022-03-14 RX ADMIN — MIDODRINE HYDROCHLORIDE SCH MG: 5 TABLET ORAL at 23:06

## 2022-03-14 NOTE — P.PN
Subjective


Date of Service: 03/14/22


Chief Complaint: Respiratory failure


Patient more alert responsive cooperative intubated hemodynamically stable





Review of Systems


is unable to be obtained





Physical Examination





- Vital Signs


Temperature: 97 F


Blood Pressure: 100/54


Pulse: 101


Respirations: 21


Pulse Ox (%): 100





- Physical Exam


General: Alert, Cooperative


Respiratory: Clear to auscultation bilaterally, Diminished


Cardiovascular: Regular rate/rhythm, Edema, Irregular heart rate/rhythm





- Studies


Medications List Reviewed: Yes





Assessment And Plan





- Current Problems (Diagnosis)


(1) Respiratory failure


Current Visit: Yes   Status: Acute   


Plan: 


Respiratory failure patient's condition has improved since she has been 

intubated patient is on pressure control which has been adjusted patient is 

anemic white count declining liver function tests have improved resume IV 

steroids x-ray reviewed endotracheal tube satisfactory chest x-ray changes have 

improved patient scheduled for dialysis today anemic


Qualifiers: 


   Chronicity: acute on chronic 


Physician Review: Patient Assessed, Agree with Above Assessment and Plan

## 2022-03-14 NOTE — P.PN
Subjective


Date of Service: 03/14/22


Chief Complaint: Respiratory failure


No major changes from yesterday.


Heart rate in the low 110s.


Blood pressure is stable.








Physical Examination





- Vital Signs


Temperature: 97.6 F


Blood Pressure: 99/60


Pulse: 98


Respirations: 26


Pulse Ox (%): 94





- Physical Exam


General: In no apparent distress, Other (Sedated)


HEENT: Other (ET tube)


Neck: JVD not distended


Respiratory: Clear to auscultation bilaterally, Normal air movement


Cardiovascular: Normal S1 S2, Edema (Bilateral legs), Irregular heart 

rate/rhythm


Gastrointestinal: Soft and benign, Non-distended


Musculoskeletal: No tenderness


Integumentary: No erythema, No cyanosis


Neurological: Other (Sedated and on mechanical ventilation)





- Studies


Medications List Reviewed: Yes





Assessment And Plan





- Plan


Continue mechanical ventilation


Hemodialysis per nephrology; awaiting for H&H and will give 1 unit of packed red

blood cells during dialysis today


Liver enzymes overall trended down


Heart rate currently controlled with low-dose beta-blocker therapy


pulmonary, cardiology, and nephrology are following.


Patient was supposed to be transferred for a new pacemaker.  He is not stable 

for procedure at the moment.  Spouse still want patient to be transferred due to

issues with dialysis here including hypotension during dialysis.


Transfuse PRBC for anemia.  Keep hemoglobin level greater than 7.  I will 

discontinue Xarelto due to recurrent anemia.


Nepro feeding.


Patient is deconditioned. He will probably benefit from rehab if he pulls 

through this.


Precedex drip for sedation








Physician Review: Patient Assessed, Agree with Above Assessment and Plan

## 2022-03-14 NOTE — P.PN
Date of Service: 03/13/22





Right Femoral Vein Central Line Procedure Note





INDICATION: Central venous access


PROCEDURE : Dr. ELLA Juarez


Ultrasound Used: Yes


CONSENT:  Obtained from pt/s mike





 


PROCEDURE SUMMARY: 


Patient positioned in reverse trendelenburg w/ both hips extended.  A time out 

was performed. My hands were washed prior to the procedure. I wore a surgical 

cap, mask  with protective eyewear, sterile gown and sterile gloves throughout 

the  procedure. The RIGHT inguinal region was prepped using  chlorhexidine scrub

and draped in sterile fashion using a full drape and  sterile probe cover and 

sterile gel employed. The femoral pulse was  identified. Anesthesia was achieved

using 1% lidocaine. With ultrasound guidance & palpating the femoral pulse, the 

introducer needle was  inserted medial to the femoral artery, inferior to the 

inguinal crease  and into the femoral vein. Venous blood was withdrawn. The 

syringe was  removed and a guidewire was advanced into the introducer needle. A 

small  incision was made at the skin surface with a scalpel and the introducer  

needle was exchanged for a dilator over the guidewire. After  appropriate 

dilation was obtained, the dilator was exchanged over the  wire for a 16 cm 

central venous catheter. The wire was removed and the  catheter was sutured in 

place.  A sterile dressing was placed.  The patient tolerated  the procedure 

without any hemodynamic compromise. At time of procedure  completion, all ports 

aspirated and flushed properly.  Estimated blood  loss is 5 cc.

## 2022-03-14 NOTE — RAD REPORT
EXAM DESCRIPTION:  RAD - Chest Single View - 3/14/2022 6:19 am

 

CLINICAL HISTORY:  Respiratory failure

Chest pain.

 

COMPARISON:  Chest Single View dated 3/13/2022; Chest Single View dated 3/12/2022; Chest Single View 
dated 3/11/2022; Chest Single View dated 3/10/2022

 

FINDINGS:  Portable technique limits examination quality.

 

Enteric tube tip is at the level of the aortic arch, unchanged. Moderate bilateral pulmonary opacitie
s appears slightly progressive since yesterday's examination. The heart is moderately enlarged with m
ultilead pacer device present. Right-sided venous catheter unchanged in position. Enteric tube tip no
t well visualized.

## 2022-03-15 LAB
ALBUMIN SERPL BCP-MCNC: 3.8 G/DL (ref 3.4–5)
ALP SERPL-CCNC: 176 U/L (ref 45–117)
ALT SERPL W P-5'-P-CCNC: 417 U/L (ref 12–78)
AST SERPL W P-5'-P-CCNC: 130 U/L (ref 15–37)
BUN BLD-MCNC: 106 MG/DL (ref 7–18)
COHGB MFR BLDA: 2.1 % (ref 0–1.5)
GLUCOSE SERPLBLD-MCNC: 177 MG/DL (ref 74–106)
HCT VFR BLD CALC: 24.5 % (ref 39.6–49)
HCT VFR BLD CALC: 25.4 % (ref 39.6–49)
LYMPHOCYTES # SPEC AUTO: 0.4 K/UL (ref 0.7–4.9)
MAGNESIUM SERPL-MCNC: 3.1 MG/DL (ref 1.8–2.4)
MORPHOLOGY BLD-IMP: (no result)
OXYHGB MFR BLDA: 82.9 % (ref 94–97)
PMV BLD: 10.9 FL (ref 7.6–11.3)
POTASSIUM SERPL-SCNC: 4.8 MMOL/L (ref 3.5–5.1)
RBC # BLD: 2.82 M/UL (ref 4.33–5.43)
SAO2 % BLDA: 86.1 % (ref 92–98.5)

## 2022-03-15 RX ADMIN — SEVELAMER CARBONATE SCH MG: 800 TABLET, FILM COATED ORAL at 09:35

## 2022-03-15 RX ADMIN — FAMOTIDINE SCH MG: 10 INJECTION, SOLUTION INTRAVENOUS at 09:35

## 2022-03-15 RX ADMIN — FAMOTIDINE SCH MG: 10 INJECTION, SOLUTION INTRAVENOUS at 21:43

## 2022-03-15 RX ADMIN — METOPROLOL TARTRATE SCH MG: 25 TABLET ORAL at 06:14

## 2022-03-15 RX ADMIN — FENTANYL CITRATE PRN MCG: 50 INJECTION, SOLUTION INTRAMUSCULAR; INTRAVENOUS at 09:55

## 2022-03-15 RX ADMIN — SODIUM CHLORIDE SCH MLS: 9 INJECTION, SOLUTION INTRAVENOUS at 09:35

## 2022-03-15 RX ADMIN — MICAFUNGIN SODIUM SCH MLS: 20 INJECTION, POWDER, LYOPHILIZED, FOR SOLUTION INTRAVENOUS at 09:35

## 2022-03-15 RX ADMIN — SEVELAMER CARBONATE SCH MG: 800 TABLET, FILM COATED ORAL at 18:34

## 2022-03-15 RX ADMIN — MIDODRINE HYDROCHLORIDE SCH MG: 5 TABLET ORAL at 18:34

## 2022-03-15 RX ADMIN — SODIUM CHLORIDE SCH MLS: 9 INJECTION, SOLUTION INTRAVENOUS at 00:19

## 2022-03-15 RX ADMIN — SEVELAMER CARBONATE SCH MG: 800 TABLET, FILM COATED ORAL at 12:20

## 2022-03-15 RX ADMIN — METOPROLOL TARTRATE SCH: 25 TABLET ORAL at 18:00

## 2022-03-15 RX ADMIN — MIDODRINE HYDROCHLORIDE SCH MG: 5 TABLET ORAL at 06:13

## 2022-03-15 RX ADMIN — MIDODRINE HYDROCHLORIDE SCH MG: 5 TABLET ORAL at 22:00

## 2022-03-15 RX ADMIN — SODIUM CHLORIDE SCH MLS: 9 INJECTION, SOLUTION INTRAVENOUS at 18:34

## 2022-03-15 NOTE — P.PN
Subjective


Date of Service: 03/15/22


Chief Complaint: Respiratory failure


Subjective: No new changes (Patient is minimally sedated while on the mechanical

ventilator. Blood cx positive for yeast)





Physical Examination





- Vital Signs


Temperature: 99.1 F


Blood Pressure: 105/60


Pulse: 94


Respirations: 23


Pulse Ox (%): 93





- Physical Exam


General: Obese, Other (sedated)


HEENT: Atraumatic, Normocephalic


Respiratory: Other (mechanically ventilated)


Cardiovascular: Normal S1 S2, Other (HD catheter R subclavian vein), Irregular 

heart rate/rhythm


Gastrointestinal: Soft and benign, Non-distended


Musculoskeletal: No clubbing, No swelling, No contractures





- Studies


Medications List Reviewed: Yes





Assessment And Plan





- Current Problems (Diagnosis)


(1) Acute hypoxemic respiratory failure due to COVID-19


Current Visit: Yes   Status: Acute   





(2) Acute exacerbation of CHF (congestive heart failure)


Current Visit: Yes   Status: Acute   





(3) Atrial fibrillation with RVR


Current Visit: No   Status: Acute   





(4) CAD (coronary artery disease)


Current Visit: No   Status: Chronic   


Qualifiers: 


   Coronary Disease-Associated Artery/Lesion type: native artery   Native vs. 

transplanted heart: native heart   Associated angina: without angina   Qualified

Code(s): I25.10 - Atherosclerotic heart disease of native coronary artery 

without angina pectoris   





(5) Hyperlipidemia


Current Visit: No   Status: Chronic   


Qualifiers: 


   Hyperlipidemia type: unspecified   Qualified Code(s): E78.5 - Hyperlipidemia,

unspecified   





(6) Hypertension


Current Visit: No   Status: Chronic   


Qualifiers: 


   Hypertension type: essential hypertension 


Physician Review: Patient Assessed, Agree with Above Assessment and Plan


Physician Review Additional Text: 





Assessment


Patient is a 71-year-old  male with a past medical history of 

congestive heart failure, pulmonary hypertension, atrial fibrillation status 

post watchman procedure, and TAVR.  He returned to the hospital complaining of 

shortness of breath and lower extremity edema.  He has been here for 

approximately 2 weeks with a complicated hospital course.  Intially treated with

a working diagnosis of decompensated CHF, rapid afib and amiodarone toxicity.  

He went into multi-organ failure manifested by oliguric renal failure, shock 

liver, respiratory failure and severe anemia.  He was placed on dialysis, which 

couldn't be maintained due to low BP.  He was intubated on 3/13.  





Acute hypoxemic respiratory failure


Fungemia with yeast


Severe anemia


Acute on chronic CHF exacerbation


Wide-complex tachycardia


Rapid afib-ECCV on 3/4 for afib. 


Transaminasemia


Acute kidney failure


Pacemaker in place





Plan: 


Started on micafungin. 


Continue mechanical ventilation.  Currently on V A/C mode, FiO2 of 45%, PEEP 10


Precedex for sedation


Transfused on 3/14 and responded appropriately. Current Hb 8.0


Hemodialysis if BP permits. He is net + 2.9L


Midodrine for borderline BP


Continue beta blockers for rapid afib


He is pending transfer to [facility] for CRRT and 3-lead pacemaker placement


Continue holding xarelto for recurrent anemia


Propofol and versed for sedation


GI prophylaxis with Famotidine


SCD for DVT prophylaxis 











03/15/22 10:12

## 2022-03-15 NOTE — RAD REPORT
[facility]  [facility]

 

CLINICAL HISTORY:  Respiratory failure

 

COMPARISON:  March 14, 2022

 

FINDINGS:  Endotracheal tube has its tip overlying the aortic arch. Enteric tube within the stomach. 
The tip is not included in the field of view. Venous line in place

 

Worsening opacification left hemithorax which may represent combination of pleural effusion, pneumoni
a and/or pulmonary edema.

 

Mild to moderate right lung opacities are unchanged

 

Cardiomegaly persists. Pacemaker leads in place

## 2022-03-15 NOTE — PN
Subjective:  The patient remains in ICU.  He is intubated.  He has borderline hypotension.  Blood pre
ssure although improved somewhat since yesterday.  He is on midodrine for blood pressure support.  He
 is dialysis dependent.  He remains anuric.  He has severe acute kidney injury.  The patient was foun
d to have anemia and thrombocytopenia, and workup was started to check haptoglobin LDH to assess for 
thrombocytopenic microangiopathy.



Review of Systems:

Unobtainable.



Objective:  Lungs:  Symmetric chest expansion. 

Cardiovascular:  S1 and S2.  No murmur. 

Abdomen:  Diminished bowel sounds. 

Extremities:  Edema present.



Impression And Plan:  

1.Acute kidney injury, secondary to ischemic acute tubular necrosis.  The patient received dialysis 
__________ yesterday and he has had intradialysis hypotension.  The patient will continue __________.
  Plan is to assess blood pressure.  The patient may need norepinephrine drip for blood pressure supp
ort.

2.Hyperkalemia, resolved.

3.Shock, secondary to pneumonia.  The patient has history of hypertension and rapid atrial fibrillat
ion.  Workup was started to rule out renal insufficiency.

4.Sepsis.  Follow up on blood cultures.  Follow up on cosyntropin stimulation test.  Follow up on a.
m. serum cortisol level and __________.

5.Respiratory failure, acute, secondary to pneumonia.  The patient has also some element of intersti
tial pulmonary edema.  Plan is to attempt ultrafiltration today and monitor blood pressure closely 

and to use midodrine and possibly Levophed for blood pressure support.





EB/MODL

DD:  03/14/2022 18:19:23Voice ID:  [id number]

DT:  03/14/2022 23:47:08Report ID:  [id number]

## 2022-03-15 NOTE — P.PN
Subjective


Date of Service: 03/15/22


Chief Complaint: Respiratory failure blood culture yeast positive


No significant change part from blood cultures positive for yeast most likely 

Candida superinfection vital signs oxygenation stable and is alert responsive 

and cooperative





Review of Systems


is unable to be obtained





Physical Examination





- Vital Signs


Temperature: 99.1 F


Blood Pressure: 105/60


Pulse: 94


Respirations: 23


Pulse Ox (%): 93





- Physical Exam


General: Alert, Cooperative


Respiratory: Diminished (Diminished air movement on the left side), Expiratory 

wheezes


Cardiovascular: Regular rate/rhythm, Edema





- Studies


Medications List Reviewed: Yes





Assessment And Plan





- Current Problems (Diagnosis)


(1) Respiratory failure


Current Visit: Yes   Status: Acute   


Plan: 


Respiratory failure chest x-ray looks worse more haziness on the left side 

possibly pleural effusion DC steroids blood cultures positive for yeast start on

micafungin r liver function tests improving PO2 satisfactory on room air patient

on a ventilator blood pressure stable prognosis poor patient is on assist 

control


Qualifiers: 


   Chronicity: acute on chronic 


Physician Review: Patient Assessed, Agree with Above Assessment and Plan

## 2022-03-16 VITALS — OXYGEN SATURATION: 99 %

## 2022-03-16 VITALS — SYSTOLIC BLOOD PRESSURE: 132 MMHG | DIASTOLIC BLOOD PRESSURE: 59 MMHG

## 2022-03-16 VITALS — TEMPERATURE: 97.5 F

## 2022-03-16 LAB
ALBUMIN SERPL BCP-MCNC: 3.6 G/DL (ref 3.4–5)
ALP SERPL-CCNC: 170 U/L (ref 45–117)
ALT SERPL W P-5'-P-CCNC: 341 U/L (ref 12–78)
AST SERPL W P-5'-P-CCNC: 115 U/L (ref 15–37)
BUN BLD-MCNC: 121 MG/DL (ref 7–18)
GLUCOSE SERPLBLD-MCNC: 142 MG/DL (ref 74–106)
HCT VFR BLD CALC: 25.7 % (ref 39.6–49)
LYMPHOCYTES # SPEC AUTO: 1 K/UL (ref 0.7–4.9)
MAGNESIUM SERPL-MCNC: 3.2 MG/DL (ref 1.8–2.4)
MORPHOLOGY BLD-IMP: (no result)
PMV BLD: 10.7 FL (ref 7.6–11.3)
POTASSIUM SERPL-SCNC: 5 MMOL/L (ref 3.5–5.1)
RBC # BLD: 2.91 M/UL (ref 4.33–5.43)

## 2022-03-16 RX ADMIN — SEVELAMER CARBONATE SCH MG: 800 TABLET, FILM COATED ORAL at 12:57

## 2022-03-16 RX ADMIN — MIDODRINE HYDROCHLORIDE SCH MG: 5 TABLET ORAL at 08:25

## 2022-03-16 RX ADMIN — SODIUM CHLORIDE SCH MLS: 9 INJECTION, SOLUTION INTRAVENOUS at 01:00

## 2022-03-16 RX ADMIN — MICAFUNGIN SODIUM SCH MLS: 20 INJECTION, POWDER, LYOPHILIZED, FOR SOLUTION INTRAVENOUS at 08:38

## 2022-03-16 RX ADMIN — SODIUM CHLORIDE SCH MLS: 9 INJECTION, SOLUTION INTRAVENOUS at 08:25

## 2022-03-16 RX ADMIN — MIDODRINE HYDROCHLORIDE SCH MG: 5 TABLET ORAL at 14:59

## 2022-03-16 RX ADMIN — SEVELAMER CARBONATE SCH: 800 TABLET, FILM COATED ORAL at 17:00

## 2022-03-16 RX ADMIN — METOPROLOL TARTRATE SCH: 25 TABLET ORAL at 06:00

## 2022-03-16 RX ADMIN — SEVELAMER CARBONATE SCH MG: 800 TABLET, FILM COATED ORAL at 08:25

## 2022-03-16 RX ADMIN — FAMOTIDINE SCH MG: 10 INJECTION, SOLUTION INTRAVENOUS at 08:25

## 2022-03-16 RX ADMIN — METOPROLOL TARTRATE SCH: 25 TABLET ORAL at 18:00

## 2022-03-16 NOTE — P.PN
Subjective


Date of Service: 03/16/22


Chief Complaint: Respiratory failure blood culture yeast positive


Patient is improving he is more alert responsive cooperative oxygen requirements

declining chest x-ray has improved





Review of Systems


is unable to be obtained





Physical Examination





- Vital Signs


Temperature: 97.5 F


Blood Pressure: 121/61


Pulse: 101


Respirations: 22


Pulse Ox (%): 99





- Physical Exam


General: Alert, Cooperative


Respiratory: Clear to auscultation bilaterally, Diminished


Cardiovascular: No edema, Irregular heart rate/rhythm


Gastrointestinal: Normal bowel sounds, Soft and benign





- Studies


Medications List Reviewed: Yes





Assessment And Plan





- Current Problems (Diagnosis)


(1) Respiratory failure


Current Visit: Yes   Status: Acute   


Plan: 


Patient is doing much better plan to wean him off the ventilator chest x-ray has

improved white count is still elevated patient is on micafungin changed to 

Rocephin blood cultures are all negative plan to wean off the ventilator


Qualifiers: 


   Chronicity: acute on chronic 


Physician Review: Patient Assessed, Agree with Above Assessment and Plan

## 2022-03-16 NOTE — PN
Date of Progress Note:  03/15/2022



Chief Complaint:  Acute kidney injury on chronic kidney disease.



History Of Present Illness:  The patient remains oliguric.  The patient is critically ill.  He is int
ubated and sedated and has borderline hypotension.  He has been treated with midodrine for blood pres
sure support and IV norepinephrine drip was ordered for bicarb.  The patient had dialysis done yester
day.  Ultrafiltration was obtained and 1 L of fluid was removed.  The patient was found to have anemi
a and thrombocytopenia.  Workup was started to check haptoglobin, LDH, to assess for thrombotic micro
angiopathy.



Review of Systems:

Unobtainable.



Physical Examination:

LUNGS:  Clear to auscultation bilaterally. 

HEART:  S1-S2. 

ABDOMEN:  Soft, benign. 

EXTREMITIES:  Edema present.



Impression:  Acute kidney injury secondary to ischemic acute tubular necrosis.  The patient received 
dialysis yesterday.  Today, he will have dialysis with ultrafiltration.  There is high BUN creatinine
 ratio corresponding with hypercatabolic state.  The patient will require daily dialysis.  The patien
t may need norepinephrine for blood pressure support.  Continue midodrine __________.  Shock, seconda
ry to pneumonia.  Continue antibiotics.  __________ follow up on blood culture.  Follow up on cosyntr
opin stimulation test. 



Respiratory failure acute secondary to pneumonia.  The patient has some element of chronic interstiti
al lung disease and pulmonary edema.  Ultrafiltration will be done to control fluid overload.





EB/MODL

DD:  03/15/2022 22:12:25Voice ID:  [id number]

DT:  03/16/2022 03:09:56Report ID:  [id number]

## 2022-03-16 NOTE — P.PN
Subjective


Date of Service: 03/16/22


Chief Complaint: Respiratory failure blood culture yeast positive


Subjective: Improving (Patient was dialyzed yesterday. Still on the mechanical 

ventilator but requiring less oxygen. Current FiO2 of 40%)





Physical Examination





- Vital Signs


Temperature: 97.5 F


Blood Pressure: 108/53


Pulse: 95


Respirations: 22


Pulse Ox (%): 98





- Physical Exam


General: Other (sedated)


HEENT: Atraumatic, Normocephalic


Respiratory: Diminished, Other (mechanically ventilated)


Cardiovascular: Normal S1 S2


Gastrointestinal: Soft and benign, Non-distended


Musculoskeletal: No clubbing, No swelling, No contractures





- Studies


Medications List Reviewed: Yes





Assessment And Plan





- Current Problems (Diagnosis)


(1) Acute hypoxemic respiratory failure due to COVID-19


Current Visit: Yes   Status: Acute   





(2) Acute exacerbation of CHF (congestive heart failure)


Current Visit: Yes   Status: Acute   





(3) Atrial fibrillation with RVR


Current Visit: No   Status: Acute   





(4) CAD (coronary artery disease)


Current Visit: No   Status: Chronic   


Qualifiers: 


   Coronary Disease-Associated Artery/Lesion type: native artery   Native vs. 

transplanted heart: native heart   Associated angina: without angina   Qualified

Code(s): I25.10 - Atherosclerotic heart disease of native coronary artery 

without angina pectoris   





(5) Hyperlipidemia


Current Visit: No   Status: Chronic   


Qualifiers: 


   Hyperlipidemia type: unspecified   Qualified Code(s): E78.5 - Hyperlipidemia,

unspecified   





(6) Hypertension


Current Visit: No   Status: Chronic   


Qualifiers: 


   Hypertension type: essential hypertension 


Physician Review: Patient Assessed, Agree with Above Assessment and Plan


Physician Review Additional Text: 





Assessment


Patient is a 71-year-old  male with a past medical history of 

congestive heart failure, pulmonary hypertension, atrial fibrillation status p

ost watchman procedure, and TAVR.  He returned to the hospital complaining of 

shortness of breath and lower extremity edema.  He has been here for 

approximately 2 weeks with a complicated hospital course.  Intially treated with

a working diagnosis of decompensated CHF, rapid afib and amiodarone toxicity.  

He went into multi-organ failure manifested by oliguric renal failure, shock 

liver, respiratory failure and severe anemia.  He was placed on dialysis, which 

couldn't be maintained due to low BP.  He was intubated on 3/13.  Blood cx 

positive for Yeast.





Acute hypoxemic respiratory failure


Fungemia with yeast


Severe anemia


Acute on chronic CHF exacerbation


Wide-complex tachycardia


Rapid afib-ECCV on 3/4 for afib. 


Transaminasemia


Acute kidney failure


Pacemaker in place





Plan: 


Patient is still critical. 


Found to have fungemia


WBC 26.9, remains elevated


No significant change of renal function


Day 2 of Micafungin


He has been meropenem for possible bacterial pneumonia


Continue mechanical ventilation.  Currently on V A/C mode, FiO2 of 40%, PEEP 10.

 Continue sedation/tube feeds


Dialysis as needed if BP permits.


Patient is net (-) for the past 24 hours


Midodrine for borderline BP


Continue beta blockers for rapid afib


He is pending transfer to [facility] for CRRT and 3-lead pacemaker placement


Xarelto held for anemia


GI prophylaxis with Famotidine


SCD for DVT prophylaxis

## 2022-03-16 NOTE — RAD REPORT
EXAM DESCRIPTION:  RAD - Chest Single View - 3/16/2022 6:34 am

 

CLINICAL HISTORY:  Respiratory failure

Chest pain.

 

COMPARISON:  Chest Single View dated 3/15/2022; Chest Single View dated 3/14/2022; Chest Single View 
dated 3/13/2022; Chest Single View dated 3/12/2022

 

FINDINGS:  Portable technique limits examination quality.

 

Tip of the endotracheal tube is at the level of the aortic arch. Right-sided venous catheter has tip 
in the SVC. Mild to moderate bilateral pulmonary opacities are present. The heart is moderately enlar
ged with a dual lead pacer device present.

## 2022-03-16 NOTE — P.DS
Admission Date: 03/03/22


Discharge Date: 03/16/22


Reason for Admission: Respiratory failure blood culture yeast positive





- Problems


(1) Acute hypoxemic respiratory failure due to COVID-19


Current Visit: Yes   Status: Acute   





(2) Acute exacerbation of CHF (congestive heart failure)


Current Visit: Yes   Status: Acute   





(3) Atrial fibrillation with RVR


Current Visit: No   Status: Acute   





(4) CAD (coronary artery disease)


Current Visit: No   Status: Chronic   


Qualifiers: 


   Coronary Disease-Associated Artery/Lesion type: native artery   Native vs. 

transplanted heart: native heart   Associated angina: without angina   Qualified

Code(s): I25.10 - Atherosclerotic heart disease of native coronary artery 

without angina pectoris   





(5) Hyperlipidemia


Current Visit: No   Status: Chronic   


Qualifiers: 


   Hyperlipidemia type: unspecified   Qualified Code(s): E78.5 - Hyperlipidemia,

unspecified   





(6) Hypertension


Current Visit: No   Status: Chronic   


Qualifiers: 


   Hypertension type: essential hypertension 


Brief History of Present Illness: 





Patient is a 71-year-old  male with a known past medical history of 

CHF, atrial fibrillation status post watchman procedure, TAVR. He presents to 

the ER accompanied by wife and complaining of shortness of breath. His symptoms 

have been ongoing for a few months now. He was diagnosed with COVID-19 on 

January 1st.  He underwent outpatient work-up at the time which included a chest

x-ray, which was negative.  Patient admits that he improved significantly.  He 

was not on any particular therapy.  A month later, he developed shortness of 

breath and was admitted here for CHF exacerbation.  He also tested positive for 

COVID-19 and had evidence of infiltrate on chest x-ray.  He was treated and 

discharged mid February.  He now returns with similar symptoms of shortness of 

breath, lower extremity edema.  Patient insists that his been compliant with his

diuresis.  He takes Lasix 40 mg p.o. twice daily.  He has been told by his 

cardiologist Dr. Lara him that he may need dual-chamber pacemaker for cardiac

dyssynchrony.


Hospital Course: 





Patient is a 71-year-old  male with a past medical history of 

congestive heart failure, pulmonary hypertension, atrial fibrillation status 

post watchman procedure, and TAVR.  He returned to the hospital complaining of 

shortness of breath and lower extremity edema.  He has been here for 

approximately 2 weeks with a complicated hospital course.  Initially treated 

with a working diagnosis of decompensated CHF, rapid afib and amiodarone 

toxicity.  He went into multi-organ failure manifested by oliguric renal 

failure, shock liver, respiratory failure and severe anemia.  He was placed on 

dialysis, which couldn't be maintained due to low BP.  He was intubated on 3/13.

 Blood cx positive for Yeast.  Today is day 2 of micafungin.  He still requiring

vasopressors.  He will benefit from CRRT for volume control.  He will also 

require assessment by EP/cardiology for a 3-lead pacemaker placement.  Active 

medical issues listed below are listed below.





Acute hypoxemic respiratory failure-intubated on 3/13


Fungemia with yeast


Severe anemia


Acute on chronic CHF exacerbation


Wide-complex tachycardia


Rapid afib-ECCV on 3/4 for afib. 


Transaminasemia


Acute kidney failure


Pacemaker in place


Vital Signs/Physical Exam: 














Temp Pulse Resp BP Pulse Ox


 


 97.5 F   104 H  28 H  115/67   94 


 


 03/16/22 14:13  03/16/22 15:00  03/16/22 15:00  03/16/22 15:00  03/16/22 15:00








Laboratory Data at Discharge: 














WBC  26.90 K/uL (4.3-10.9)  H* D 03/16/22  06:40    


 


Hgb  8.2 g/dL (13.6-17.9)  L  03/16/22  06:40    


 


Hct  25.7 % (39.6-49.0)  L  03/16/22  06:40    


 


Plt Count  67 K/uL (152-406)  L  03/16/22  06:40    


 


PT  15.4 SECONDS (9.5-12.5)  H  03/14/22  05:00    


 


INR  1.39   03/14/22  05:00    


 


APTT  23.1 SECONDS (24.3-36.9)  L  03/14/22  05:00    


 


Sodium  139 mmol/L (136-145)   03/16/22  06:40    


 


Potassium  5.0 mmol/L (3.5-5.1)   03/16/22  06:40    


 


BUN  121 mg/dL (7-18)  H  03/16/22  06:40    


 


Creatinine  5.14 mg/dL (0.55-1.3)  H*  03/16/22  06:40    


 


Glucose  142 mg/dL ()  H  03/16/22  06:40    


 


Phosphorus  8.7 mg/dL (2.5-4.9)  H*  03/16/22  06:40    


 


Magnesium  3.2 mg/dL (1.8-2.4)  H  03/16/22  06:40    


 


Total Bilirubin  1.8 mg/dL (0.2-1.0)  H  03/16/22  06:40    


 


AST  115 U/L (15-37)  H  03/16/22  06:40    


 


ALT  341 U/L (12-78)  H*  03/16/22  06:40    


 


Alkaline Phosphatase  170 U/L ()  H  03/16/22  06:40    


 


Lipase  48 U/L ()  L  03/03/22  08:08    








Home Medications: 








Pantoprazole [Protonix Tab*] 40 mg PO DAILYAC #30 tab 12/24/16 


Rosuvastatin [Crestor*] 40 mg PO BEDTIME 02/02/17 


Furosemide [Lasix*] 40 mg PO DAILY PRN #30 tab 02/03/17 


Clopidogrel Bisulfate [Plavix*] 75 mg PO DAILY #30 tablet 12/09/19 


Amiodarone HCl [Pacerone] 200 mg PO BID 30 Days #60 tab 02/14/22 


Metoprolol Tartrate [Lopressor*] 50 mg PO BID 30 Days #60 tab 02/14/22 


Rivaroxaban [Xarelto] 20 mg PO DAILY 30 Days #30 tab 02/14/22 





Followup: 


Gaston Ruiz MD [Primary Care Provider] -

## 2022-03-17 NOTE — PN
Date of Progress Note:  03/16/2022



Chief Complaint:  Acute kidney injury, severe; oliguric.



Subjective:  The patient has an underlying chronic kidney disease, developed severe acute kidney inju
ry secondary to acute tubular necrosis.  He remains critically ill in ICU.  He is intubated and sedat
ed, has borderline hypotension.  He required an IV norepinephrine drip as well as midodrine.  Today, 
he is off norepinephrine drip.  The patient had dialysis done yesterday.  Ultrafiltration will be don
e to control anasarca.  The patient was found to have thrombocytopenia and a workup was started to ch
joie haptoglobin, LDH to assess for thrombotic microangiopathy.



Review of Systems:

Unobtainable.



Physical Examination:

Lungs:  Coarse breath sounds bilaterally. 

Heart:  S1 and S2. 

Abdomen:  Soft. 

Extremities:  Edema present.



Impression And Plan:  

1.Acute kidney injury secondary to ischemic acute tubular necrosis.  Patient received dialysis yeste
rday.  Patient is to have dialysis today and there is high BUN-creatinine ratio corresponding with hy
percatabolic state.  Patient will require daily dialysis and he may need continuous veno-venous hemod
ialysis due to persistent hypotension.  Patient was found to have fungemia and treatment was started 
for fungemia and dialysis catheter may need to be replaced due to fungemia.  Patient will screen for 
hypoadrenal state and cosyntropin stimulation tests were done and results are pending.

2.Respiratory failure secondary to pneumonia.  The patient has element of chronic interstitial lung 
disease as well as pulmonary 

edema.  Ultrafiltration with dialysis was ordered to control fluid overload.





EB/MODL

DD:  03/16/2022 22:31:04Voice ID:  [id number]

DT:  03/17/2022 03:10:54Report ID:  [id number]